# Patient Record
Sex: FEMALE | Race: WHITE | NOT HISPANIC OR LATINO | Employment: OTHER | URBAN - METROPOLITAN AREA
[De-identification: names, ages, dates, MRNs, and addresses within clinical notes are randomized per-mention and may not be internally consistent; named-entity substitution may affect disease eponyms.]

---

## 2021-05-18 ENCOUNTER — TRANSFERRED RECORDS (OUTPATIENT)
Dept: HEALTH INFORMATION MANAGEMENT | Facility: CLINIC | Age: 61
End: 2021-05-18

## 2021-11-02 ENCOUNTER — TRANSFERRED RECORDS (OUTPATIENT)
Dept: HEALTH INFORMATION MANAGEMENT | Facility: HOSPITAL | Age: 61
End: 2021-11-02

## 2021-11-02 LAB
AST SERPL-CCNC: 18 U/L (ref 5–41)
CHOLESTEROL (EXTERNAL): 209 MG/DL
CREATININE (EXTERNAL): 0.82 MG/DL (ref 0.57–1.11)
GFR ESTIMATED (EXTERNAL): >60 ML/MIN/1.73M2
GLUCOSE (EXTERNAL): 95 MG/DL (ref 70–100)
HDLC SERPL-MCNC: 50 MG/DL
HEP C HIM: NORMAL
LDL CHOLESTEROL CALCULATED (EXTERNAL): 131 MG/DL (ref 0–159)
POTASSIUM (EXTERNAL): 3.9 MMOL/L (ref 3.5–5.1)
TRIGLYCERIDES (EXTERNAL): 139 MG/DL (ref 30–150)

## 2021-12-07 ENCOUNTER — TRANSFERRED RECORDS (OUTPATIENT)
Dept: HEALTH INFORMATION MANAGEMENT | Facility: CLINIC | Age: 61
End: 2021-12-07

## 2022-02-24 ENCOUNTER — TRANSFERRED RECORDS (OUTPATIENT)
Dept: HEALTH INFORMATION MANAGEMENT | Facility: CLINIC | Age: 62
End: 2022-02-24

## 2022-07-07 ENCOUNTER — TRANSFERRED RECORDS (OUTPATIENT)
Dept: HEALTH INFORMATION MANAGEMENT | Facility: CLINIC | Age: 62
End: 2022-07-07

## 2022-10-11 ENCOUNTER — MEDICAL CORRESPONDENCE (OUTPATIENT)
Dept: CT IMAGING | Facility: HOSPITAL | Age: 62
End: 2022-10-11

## 2022-10-13 ENCOUNTER — OFFICE VISIT (OUTPATIENT)
Dept: FAMILY MEDICINE | Facility: OTHER | Age: 62
End: 2022-10-13
Attending: STUDENT IN AN ORGANIZED HEALTH CARE EDUCATION/TRAINING PROGRAM
Payer: COMMERCIAL

## 2022-10-13 VITALS
OXYGEN SATURATION: 97 % | SYSTOLIC BLOOD PRESSURE: 120 MMHG | RESPIRATION RATE: 18 BRPM | TEMPERATURE: 96.8 F | HEART RATE: 102 BPM | BODY MASS INDEX: 36.99 KG/M2 | WEIGHT: 222 LBS | HEIGHT: 65 IN | DIASTOLIC BLOOD PRESSURE: 64 MMHG

## 2022-10-13 DIAGNOSIS — F07.81 POST CONCUSSIVE SYNDROME: ICD-10-CM

## 2022-10-13 DIAGNOSIS — I10 ESSENTIAL HYPERTENSION: Primary | Chronic | ICD-10-CM

## 2022-10-13 DIAGNOSIS — D36.9 TUBULAR ADENOMA: ICD-10-CM

## 2022-10-13 DIAGNOSIS — J45.40 MODERATE PERSISTENT ASTHMA WITHOUT COMPLICATION: ICD-10-CM

## 2022-10-13 DIAGNOSIS — E04.1 THYROID NODULE: ICD-10-CM

## 2022-10-13 DIAGNOSIS — G35 MS (MULTIPLE SCLEROSIS) (H): ICD-10-CM

## 2022-10-13 DIAGNOSIS — M47.816 LUMBAR SPONDYLOSIS: ICD-10-CM

## 2022-10-13 PROBLEM — K20.90 ESOPHAGITIS: Status: ACTIVE | Noted: 2022-10-13

## 2022-10-13 PROBLEM — K57.90 DIVERTICULOSIS: Status: ACTIVE | Noted: 2017-07-25

## 2022-10-13 PROBLEM — J30.0 VASOMOTOR RHINITIS: Status: ACTIVE | Noted: 2018-06-29

## 2022-10-13 PROBLEM — E78.5 HYPERLIPIDEMIA: Chronic | Status: ACTIVE | Noted: 2022-10-13

## 2022-10-13 PROBLEM — E78.5 HYPERLIPIDEMIA: Status: ACTIVE | Noted: 2022-10-13

## 2022-10-13 PROBLEM — Z87.39 HISTORY OF OSTEOMYELITIS: Status: ACTIVE | Noted: 2022-01-06

## 2022-10-13 PROBLEM — M86.60 CHRONIC OSTEOMYELITIS (H): Status: ACTIVE | Noted: 2022-01-06

## 2022-10-13 LAB
ANION GAP SERPL CALCULATED.3IONS-SCNC: 9 MMOL/L (ref 7–15)
BUN SERPL-MCNC: 19.6 MG/DL (ref 8–23)
CALCIUM SERPL-MCNC: 9.2 MG/DL (ref 8.8–10.2)
CHLORIDE SERPL-SCNC: 107 MMOL/L (ref 98–107)
CREAT SERPL-MCNC: 1.01 MG/DL (ref 0.51–0.95)
DEPRECATED HCO3 PLAS-SCNC: 26 MMOL/L (ref 22–29)
GFR SERPL CREATININE-BSD FRML MDRD: 62 ML/MIN/1.73M2
GLUCOSE SERPL-MCNC: 97 MG/DL (ref 70–99)
POTASSIUM SERPL-SCNC: 4.6 MMOL/L (ref 3.4–5.3)
SODIUM SERPL-SCNC: 142 MMOL/L (ref 136–145)
THEOPHYLLINE SERPL-MCNC: 5.4 UG/ML (ref 10–20)
TSH SERPL DL<=0.005 MIU/L-ACNC: 1.22 UIU/ML (ref 0.3–4.2)

## 2022-10-13 PROCEDURE — G0463 HOSPITAL OUTPT CLINIC VISIT: HCPCS

## 2022-10-13 PROCEDURE — 80198 ASSAY OF THEOPHYLLINE: CPT | Mod: ZL | Performed by: STUDENT IN AN ORGANIZED HEALTH CARE EDUCATION/TRAINING PROGRAM

## 2022-10-13 PROCEDURE — 99204 OFFICE O/P NEW MOD 45 MIN: CPT | Performed by: STUDENT IN AN ORGANIZED HEALTH CARE EDUCATION/TRAINING PROGRAM

## 2022-10-13 PROCEDURE — 84443 ASSAY THYROID STIM HORMONE: CPT | Mod: ZL | Performed by: STUDENT IN AN ORGANIZED HEALTH CARE EDUCATION/TRAINING PROGRAM

## 2022-10-13 PROCEDURE — 36415 COLL VENOUS BLD VENIPUNCTURE: CPT | Mod: ZL | Performed by: STUDENT IN AN ORGANIZED HEALTH CARE EDUCATION/TRAINING PROGRAM

## 2022-10-13 PROCEDURE — 82310 ASSAY OF CALCIUM: CPT | Mod: ZL | Performed by: STUDENT IN AN ORGANIZED HEALTH CARE EDUCATION/TRAINING PROGRAM

## 2022-10-13 RX ORDER — IBUPROFEN 600 MG/1
600 TABLET, FILM COATED ORAL
COMMUNITY
Start: 2021-12-21 | End: 2023-03-02

## 2022-10-13 RX ORDER — MOMETASONE FUROATE MONOHYDRATE 50 UG/1
2 SPRAY, METERED NASAL DAILY
Qty: 17 G | Refills: 3 | Status: ON HOLD | OUTPATIENT
Start: 2022-10-13 | End: 2024-04-30

## 2022-10-13 RX ORDER — THEOPHYLLINE 400 MG/1
TABLET, EXTENDED RELEASE ORAL
Qty: 90 TABLET | Refills: 0 | Status: SHIPPED | OUTPATIENT
Start: 2022-10-13 | End: 2022-10-25

## 2022-10-13 RX ORDER — RAMIPRIL 10 MG/1
10 CAPSULE ORAL
COMMUNITY
Start: 2022-07-07 | End: 2022-10-13

## 2022-10-13 RX ORDER — ACETAMINOPHEN 500 MG
1000 TABLET ORAL DAILY PRN
Status: ON HOLD | COMMUNITY
End: 2024-04-30

## 2022-10-13 RX ORDER — CHOLECALCIFEROL (VITAMIN D3) 125 MCG
2000 CAPSULE ORAL
Status: ON HOLD | COMMUNITY
End: 2024-04-30

## 2022-10-13 RX ORDER — GINSENG 100 MG
1 CAPSULE ORAL DAILY
COMMUNITY

## 2022-10-13 RX ORDER — ALBUTEROL SULFATE 90 UG/1
2 AEROSOL, METERED RESPIRATORY (INHALATION) EVERY 4 HOURS PRN
Qty: 18 G | Refills: 3 | Status: SHIPPED | OUTPATIENT
Start: 2022-10-13 | End: 2023-05-03

## 2022-10-13 RX ORDER — THEOPHYLLINE 400 MG/1
200 TABLET, EXTENDED RELEASE ORAL
COMMUNITY
Start: 2022-07-07 | End: 2022-10-13

## 2022-10-13 RX ORDER — OMEGA-3/DHA/EPA/FISH OIL 300-1000MG
1 CAPSULE ORAL DAILY
COMMUNITY

## 2022-10-13 RX ORDER — ALBUTEROL SULFATE 90 UG/1
2 AEROSOL, METERED RESPIRATORY (INHALATION)
COMMUNITY
Start: 2022-07-07 | End: 2022-10-13

## 2022-10-13 RX ORDER — LUTEIN 10 MG
20 TABLET ORAL EVERY MORNING
COMMUNITY

## 2022-10-13 RX ORDER — LANOLIN ALCOHOL/MO/W.PET/CERES
400 CREAM (GRAM) TOPICAL EVERY MORNING
COMMUNITY

## 2022-10-13 RX ORDER — CETIRIZINE HYDROCHLORIDE 10 MG/1
1 TABLET ORAL EVERY MORNING
COMMUNITY

## 2022-10-13 RX ORDER — MOMETASONE FUROATE MONOHYDRATE 50 UG/1
2 SPRAY, METERED NASAL
COMMUNITY
Start: 2022-07-07 | End: 2022-10-13

## 2022-10-13 RX ORDER — RAMIPRIL 10 MG/1
10 CAPSULE ORAL DAILY
Qty: 90 CAPSULE | Refills: 0 | Status: SHIPPED | OUTPATIENT
Start: 2022-10-13 | End: 2023-05-03

## 2022-10-13 ASSESSMENT — PAIN SCALES - GENERAL: PAINLEVEL: SEVERE PAIN (7)

## 2022-10-13 NOTE — RESULT ENCOUNTER NOTE
Please call and notify patient that her thyroid function looks good.   Theophylline level is on the lower side, but where it has been in the past. As long as her asthma symptoms are controlled, we do not need to adjust the dose.   Her electrolytes look good but kidney function has increased slightly from before, although still in normal range. Encourage good hydration and not taking too much NSAID or ibuprofen. We can monitor again at her next appointment.     Thanks!

## 2022-10-13 NOTE — NURSING NOTE
"Chief Complaint   Patient presents with     Establish Care       Initial /64   Pulse 102   Temp 96.8  F (36  C) (Tympanic)   Resp 18   Ht 1.651 m (5' 5\")   Wt 100.7 kg (222 lb)   SpO2 97%   BMI 36.94 kg/m   Estimated body mass index is 36.94 kg/m  as calculated from the following:    Height as of this encounter: 1.651 m (5' 5\").    Weight as of this encounter: 100.7 kg (222 lb).  Medication Reconciliation: complete  Angie Montilla LPN    "

## 2022-10-13 NOTE — LETTER
January 9, 2023      Rosa Alcocer     [facility] [id number]        Dear ,    We are writing to inform you of your test results.    Your thyroid ultrasound was stable. We should repeat again in one year.     Resulted Orders   US Thyroid    Narrative    Exam: US THYROID    Exam reason: thyroid nodules, 1 year follow up, [facility]; Thyroid nodule    Technique: Ultrasound examination of the thyroid and adjacent soft  tissues was performed.    Comparison: 1/18/2022.    FINDINGS:    RIGHT LOBE:    Right lobe measures 5.2 x 2.1 x 2.0 cm.  Texture: Heterogenous.    Nodule #: 1. Location: Mid right lobe.  Size: 2 x 1.5 x 1.1 cm. Change in size: Not significantly changed.  Composition: solid/almost completely solid (2).  Echogenicity: isoechoic (1).  Shape: not taller-than-wide (0)  Margins: ill-defined (0).  Echogenic foci: none (0).      ACR TI-RADS total points: 3.   ACR TI-RADS risk category: TR3 (3 points)..    Nodule #: 2. Location: Inferior right lobe.   Size: 1.3 x 1.1 x 0.8 cm. Change in size: Not significantly changed.  Composition: solid/almost completely solid (2).  Echogenicity: isoechoic (1).  Shape: not taller-than-wide (0)  Margins: ill-defined (0).  Echogenic foci: none (0).     ACR TI-RADS total points: 3.   ACR TI-RADS risk category: TR3 (3 points).    LEFT LOBE:    Left lobe measures: 5.0 x 1.5 x 1.5 cm.  Texture: Heterogenous.    Nodule #: 3. Location: Inferior medial left lobe.  Size: 0.7 x 0.6 x 0.4 cm cm. Change in size: Not significantly changed  Composition: solid/almost completely solid (2).  Echogenicity: hypoechoic (2).  Shape: not taller-than-wide (0)  Margins: ill-defined (0).  Echogenic foci: none (0).    ACR TI-RADS total points: 4.   ACR TI-RADS risk category: TR4 (4-6 points).    Nodule #: 4. Location: Inferior left lobe.  Size: 1.1 x 1 x 0.7 cm. Change in size: Not significantly changed  Composition: solid/almost completely solid (2).  Echogenicity:  hyperechoic (1).  Shape: not taller-than-wide (0)  Margins: ill-defined (0).  Echogenic foci: none (0).      ACR TI-RADS total points: 3.   ACR TI-RADS risk category: TR3 (3 points).      ISTHMUS:   Thickness: 0.6 cm.    No discrete nodules.    LYMPH NODES: No adenopathy identified.         Impression    IMPRESSION:  There are 4 thyroid nodules which have not changed significantly, the  largest of which is a TR3 nodule in the right lobe of the thyroid  measuring up to 2 cm. There is a TR 4 nodule in the left thyroid  measuring up to 0.7 cm. Follow-up in 1 year is recommended.    ACR TI-RADS recommendations:   TR5 (?7 points) -FNA if ? 1cm, follow-up if 0.5 -0.9 cm every year for  5 years  TR4 (4-6 points) -FNA if ? 1.5cm, follow-up if 1 -1.4 cm in 1, 2, 3  and 5 years  TR3 (3 points)-FNA if ? 2.5cm, follow-up if 1.5 -2.4 cm in 1, 3 and 5  years  TR2 (2 points) & TR1 (0 points) -No FNA or follow-up    DEDE BOOKER MD         SYSTEM ID:  [id number]       If you have any questions or concerns, please call the clinic at the number listed above.       Sincerely,      Cate Alvarado MD

## 2022-10-13 NOTE — PROGRESS NOTES
Assessment & Plan     Essential hypertension  Well-controlled.  We will update BMP today.  Continue ramipril.  - Basic metabolic panel; Future  - ramipril (ALTACE) 10 MG capsule; Take 1 capsule (10 mg) by mouth daily  - Basic metabolic panel    Moderate persistent asthma without complication  Overall stable.  Has not tolerated steroid inhalers in the past, per patient report.  Did fill out release to obtain records from prior pulmonologist.  Due for monitoring of theophylline - level on lower end at 5.6 but since symptoms are controlled will not change dose today.   - Theophylline level; Future  - albuterol (PROAIR HFA/PROVENTIL HFA/VENTOLIN HFA) 108 (90 Base) MCG/ACT inhaler; Inhale 2 puffs into the lungs every 4 hours as needed for shortness of breath / dyspnea or wheezing  - mometasone (NASONEX) 50 MCG/ACT nasal spray; Spray 2 sprays in nostril daily  - theophylline (UNIPHYL) 400 MG 24 hr tablet; Take 200mg (one half tab) daily.  - Theophylline level    Post concussive syndrome  Symptoms very slowly improving.  Has completed physical, occupational, and neuro optic therapy.  Does still have significant insomnia.    MS (multiple sclerosis) (H)  In remission.  Has never required medication for this.  Saw neurology in the past.  Reports she does not need follow-up.    Tubular adenoma  Up-to-date on colonoscopy screening, as she had one this year.  Was told repeat again in 3 to 5 years.  We will review those records.    Thyroid nodule  Previous thyroid nodules, 4, that are being monitored.  Next due in January 2023.  Will order ultrasound for then and also update TSH today.  - US Thyroid; Future  - TSH with free T4 reflex; Future  - TSH with free T4 reflex  - US Thyroid    Lumbar spondylosis  She has chronic low back pain and is status post left radiofrequency percutaneous neurotomies of the L3 and L4.  Is actually planning a cortisone injection of her SI joint and is hopeful that will help.  She will follow-up if  "it does not improve.  Did also discuss PM&R, however she does not want to travel to [place] for this.  Could see if they come to [place] in the future.      I spent a total of 59 minutes on the day of the visit.   Time spent doing chart review, history and exam, documentation and further activities per the note     BMI:   Estimated body mass index is 36.94 kg/m  as calculated from the following:    Height as of this encounter: 1.651 m (5' 5\").    Weight as of this encounter: 100.7 kg (222 lb).   Weight management plan: Patient was referred to their PCP to discuss a diet and exercise plan.      Return if symptoms worsen or fail to improve. Has establish care appointment in Feb 2023.       Cate Alvarado MD  [facility]   Rosa is a 62 year old presenting for the following health issues:  Med check    HPI     Medication refills. Establishing with Dr. Masterson in Feb.     On ramipril for hypertension. Well controlled.     Previously saw pulmonologist in [place] for asthma. Is on theophylline. Did not tolerate flovent or symbicort in the past. Has been on theophylline for 12 years. Uses short acting albuterol once per day. When she is around others who smoke it makes it worse. Does not want to see pulm again. Last had lung tests done last year. Was getting levels monitored 1-2x/year.     History of double concussion syndrome. Lost memory. Still hard time with it. Has completed therapies and had prism glasses. Still has trouble sleeping. Overall improving though.     Has history of MS in remission. Diagnosed on spinal tap. Never on medicine. Has seen neurology in the past. Does not need follow up.     Has history of tubular adenomas. Had a colonoscopy this year. Did not find anything this year. Said repeat in 3-5 years per patient.     [facility] this year [facility]    Having more pain in lower back. Had left radiofrequency neurotomy of L3, L4 and L5 dorsal ramus prior in " "[place]. Going in for cortisone in SI joint next week. Did therapy until July. Wondering about pain clinic.     Had PAP with neg HPV in 2015. Unsure if she had one since. Need to obtain records.       Please abstract the following data from this visit with this patient into the appropriate field in Epic:    Tests that can be patient reported without a hard copy:  Mammogram done on this date: 3/2022 (approximately), by this group: [facility], results were normal.      Other Tests found in the patient's chart through Chart Review/Care Everywhere:  Lipid profile done by this group [facility] on this date: 11/2/2021    Note to Abstraction: If this section is blank, no results were found via Chart Review/Care Everywhere.        Objective    /64   Pulse 102   Temp 96.8  F (36  C) (Tympanic)   Resp 18   Ht 1.651 m (5' 5\")   Wt 100.7 kg (222 lb)   SpO2 97%   BMI 36.94 kg/m    Body mass index is 36.94 kg/m .     Physical Exam  Constitutional:       General: She is not in acute distress.     Appearance: Normal appearance.   Cardiovascular:      Rate and Rhythm: Normal rate and regular rhythm.      Heart sounds: No murmur heard.  Pulmonary:      Effort: Pulmonary effort is normal.      Breath sounds: Normal breath sounds. No wheezing or rales.   Neurological:      Mental Status: She is alert.   Psychiatric:         Mood and Affect: Mood normal.         Behavior: Behavior normal.          Results for orders placed or performed in visit on 10/13/22 (from the past 24 hour(s))   TSH with free T4 reflex   Result Value Ref Range    TSH 1.22 0.30 - 4.20 uIU/mL   Basic metabolic panel   Result Value Ref Range    Sodium 142 136 - 145 mmol/L    Potassium 4.6 3.4 - 5.3 mmol/L    Chloride 107 98 - 107 mmol/L    Carbon Dioxide (CO2) 26 22 - 29 mmol/L    Anion Gap 9 7 - 15 mmol/L    Urea Nitrogen 19.6 8.0 - 23.0 mg/dL    Creatinine 1.01 (H) 0.51 - 0.95 mg/dL    Calcium 9.2 8.8 - 10.2 mg/dL    Glucose 97 70 - 99 mg/dL    GFR " Estimate 62 >60 mL/min/1.73m2   Theophylline level   Result Value Ref Range    Theophylline 5.4 (L) 10.0 - 20.0 ug/mL

## 2022-10-17 ENCOUNTER — TELEPHONE (OUTPATIENT)
Dept: FAMILY MEDICINE | Facility: OTHER | Age: 62
End: 2022-10-17

## 2022-10-17 DIAGNOSIS — J45.40 MODERATE PERSISTENT ASTHMA WITHOUT COMPLICATION: Primary | ICD-10-CM

## 2022-10-18 ENCOUNTER — TELEPHONE (OUTPATIENT)
Dept: FAMILY MEDICINE | Facility: OTHER | Age: 62
End: 2022-10-18

## 2022-10-18 NOTE — TELEPHONE ENCOUNTER
Pt calling and thought she missed a call and thought maybe it was an increase in her Theophylline?    She is aware go the lab results from 10.13.2022    A lot of times her asthma is controlled but she has been spending more time with people who smoke.She is wondering if she should alternate taking one whole pill(400mg) and then 1/2tab(200mg).She takes the time released.       The med is at [facility]      Please advise.    Call back [phone] to leave detailed message    Radha Veronica RN

## 2022-10-18 NOTE — TELEPHONE ENCOUNTER
Does feel like she is needing her albuterol daily, more often if around smokers. Again didn't tolerate inhaled steroids prior. Theophylline low, and near trough level. Will increase to 400mg. Recheck three days after higher dose, Monday 10/24 at 3pm for trough level. She will monitor for any signs of toxicity.

## 2022-10-21 ENCOUNTER — HOSPITAL ENCOUNTER (OUTPATIENT)
Dept: CT IMAGING | Facility: HOSPITAL | Age: 62
Discharge: HOME | End: 2022-10-21
Attending: NURSE PRACTITIONER | Admitting: RADIOLOGY
Payer: COMMERCIAL

## 2022-10-21 ENCOUNTER — HOSPITAL ENCOUNTER (OUTPATIENT)
Facility: HOSPITAL | Age: 62
Discharge: HOME | End: 2022-10-21
Attending: RADIOLOGY | Admitting: RADIOLOGY
Payer: COMMERCIAL

## 2022-10-21 DIAGNOSIS — G89.29 CHRONIC LEFT SI JOINT PAIN: ICD-10-CM

## 2022-10-21 DIAGNOSIS — M53.3 CHRONIC LEFT SI JOINT PAIN: ICD-10-CM

## 2022-10-21 PROCEDURE — 27096 INJECT SACROILIAC JOINT: CPT

## 2022-10-21 PROCEDURE — 250N000009 HC RX 250: Performed by: RADIOLOGY

## 2022-10-21 PROCEDURE — 250N000011 HC RX IP 250 OP 636: Performed by: RADIOLOGY

## 2022-10-21 RX ORDER — BUPIVACAINE HYDROCHLORIDE 5 MG/ML
10 INJECTION, SOLUTION EPIDURAL; INTRACAUDAL ONCE
Status: COMPLETED | OUTPATIENT
Start: 2022-10-21 | End: 2022-10-21

## 2022-10-21 RX ORDER — TRIAMCINOLONE ACETONIDE 40 MG/ML
40-80 INJECTION, SUSPENSION INTRA-ARTICULAR; INTRAMUSCULAR ONCE
Status: COMPLETED | OUTPATIENT
Start: 2022-10-21 | End: 2022-10-21

## 2022-10-21 RX ORDER — LIDOCAINE HYDROCHLORIDE 10 MG/ML
10 INJECTION, SOLUTION EPIDURAL; INFILTRATION; INTRACAUDAL; PERINEURAL ONCE
Status: COMPLETED | OUTPATIENT
Start: 2022-10-21 | End: 2022-10-21

## 2022-10-21 RX ADMIN — BUPIVACAINE HYDROCHLORIDE 10 ML: 5 INJECTION, SOLUTION EPIDURAL; INTRACAUDAL; PERINEURAL at 08:31

## 2022-10-21 RX ADMIN — LIDOCAINE HYDROCHLORIDE 8 ML: 10 INJECTION, SOLUTION EPIDURAL; INFILTRATION; INTRACAUDAL; PERINEURAL at 08:31

## 2022-10-21 RX ADMIN — TRIAMCINOLONE ACETONIDE 40 MG: 40 INJECTION, SUSPENSION INTRA-ARTICULAR; INTRAMUSCULAR at 08:32

## 2022-10-21 ASSESSMENT — ACTIVITIES OF DAILY LIVING (ADL)
ADLS_ACUITY_SCORE: 35
ADLS_ACUITY_SCORE: 35

## 2022-10-21 NOTE — PROGRESS NOTES
Checked with pharmacy due to red flag when ordering Kenalog for an SI injection because Patient has fluticasone on allergy list. Patient does use Nasonex. She also has had other injections with steroids without any allergic reactions. Pharmacist agrees that patient is ok to receive injection today

## 2022-10-21 NOTE — DISCHARGE INSTRUCTIONS
Discharge Instructions for an Radiology Injection    Home number on file [phone] (home)  Is it ok to leave a message at this number(s) Yes    Dr Rich completed your procedure on 10/21/2022.    Current Pain Level (0-10 Scale): 8/10  Post Pain Level (0-10):  3/10      Activity Level:     Do not do any heavy activity or exercise for 24 hours.   Hip Injections:  Do not drive for 4 hours after your injection.  Diet:   Return to your normal diet.  Medications:   If you have stopped taking your Aspirin, Coumadin/Warfarin, Ibuprofen, or any   other blood thinner for this procedure you may resume in the morning unless   your primary care provider has given you other instructions.    Diabetics may see an increase in blood sugar after steroid injections. If you are concerned about your blood sugar, please contact your family doctor.    Site Care:  Remove the bandage and bathe or shower the morning after the procedure.      Please allow two weeks to experience improvement in your pain.  If you have any further issues, please contact your provider.  Call your Primary Care Provider if you have the following (if your primary care provider is not available please seek emergency care):   Nausea with vomiting   Severe headache   Drowsiness or confusion   Redness or drainage at the injection or puncture site   Temperature over 101 degrees F   Other concerns   Increasing joint pain

## 2022-10-24 ENCOUNTER — LAB (OUTPATIENT)
Dept: LAB | Facility: OTHER | Age: 62
End: 2022-10-24
Payer: COMMERCIAL

## 2022-10-24 DIAGNOSIS — J45.40 MODERATE PERSISTENT ASTHMA WITHOUT COMPLICATION: ICD-10-CM

## 2022-10-24 LAB — THEOPHYLLINE SERPL-MCNC: 9 UG/ML (ref 10–20)

## 2022-10-24 PROCEDURE — 80198 ASSAY OF THEOPHYLLINE: CPT | Mod: ZL

## 2022-10-24 PROCEDURE — 36415 COLL VENOUS BLD VENIPUNCTURE: CPT | Mod: ZL

## 2022-10-24 NOTE — RESULT ENCOUNTER NOTE
Please call and notify patient that her theophylline level is better - in range with her asthma and age. We can monitor her asthma control now and recheck as needed, at least once/year or if other medications change.     Thanks!

## 2022-10-25 RX ORDER — THEOPHYLLINE 400 MG/1
400 TABLET, EXTENDED RELEASE ORAL DAILY
Qty: 90 TABLET | Refills: 0 | Status: SHIPPED | OUTPATIENT
Start: 2022-10-25 | End: 2022-10-25

## 2022-10-25 RX ORDER — THEOPHYLLINE 400 MG/1
400 TABLET, EXTENDED RELEASE ORAL DAILY
Qty: 90 TABLET | Refills: 0 | Status: SHIPPED | OUTPATIENT
Start: 2022-10-25 | End: 2023-02-02

## 2023-01-09 ENCOUNTER — HOSPITAL ENCOUNTER (EMERGENCY)
Facility: HOSPITAL | Age: 63
End: 2023-01-09
Payer: COMMERCIAL

## 2023-01-09 ENCOUNTER — HOSPITAL ENCOUNTER (EMERGENCY)
Facility: HOSPITAL | Age: 63
Discharge: HOME | End: 2023-01-09
Attending: NURSE PRACTITIONER | Admitting: NURSE PRACTITIONER
Payer: COMMERCIAL

## 2023-01-09 ENCOUNTER — HOSPITAL ENCOUNTER (OUTPATIENT)
Dept: ULTRASOUND IMAGING | Facility: HOSPITAL | Age: 63
Discharge: HOME | End: 2023-01-09
Attending: STUDENT IN AN ORGANIZED HEALTH CARE EDUCATION/TRAINING PROGRAM | Admitting: STUDENT IN AN ORGANIZED HEALTH CARE EDUCATION/TRAINING PROGRAM
Payer: COMMERCIAL

## 2023-01-09 VITALS
RESPIRATION RATE: 18 BRPM | DIASTOLIC BLOOD PRESSURE: 98 MMHG | SYSTOLIC BLOOD PRESSURE: 154 MMHG | HEART RATE: 95 BPM | TEMPERATURE: 99.2 F | OXYGEN SATURATION: 95 %

## 2023-01-09 DIAGNOSIS — J06.9 UPPER RESPIRATORY INFECTION: ICD-10-CM

## 2023-01-09 DIAGNOSIS — G44.219 EPISODIC TENSION-TYPE HEADACHE, NOT INTRACTABLE: ICD-10-CM

## 2023-01-09 DIAGNOSIS — J32.9 SINUSITIS: Primary | ICD-10-CM

## 2023-01-09 DIAGNOSIS — J32.9 SINUSITIS, UNSPECIFIED CHRONICITY, UNSPECIFIED LOCATION: ICD-10-CM

## 2023-01-09 LAB
FLUAV RNA SPEC QL NAA+PROBE: NEGATIVE
FLUBV RNA RESP QL NAA+PROBE: NEGATIVE
RSV RNA SPEC NAA+PROBE: NEGATIVE
SARS-COV-2 RNA RESP QL NAA+PROBE: NEGATIVE

## 2023-01-09 PROCEDURE — C9803 HOPD COVID-19 SPEC COLLECT: HCPCS

## 2023-01-09 PROCEDURE — G0463 HOSPITAL OUTPT CLINIC VISIT: HCPCS | Mod: 25

## 2023-01-09 PROCEDURE — 99213 OFFICE O/P EST LOW 20 MIN: CPT | Performed by: NURSE PRACTITIONER

## 2023-01-09 PROCEDURE — 76536 US EXAM OF HEAD AND NECK: CPT

## 2023-01-09 PROCEDURE — G0463 HOSPITAL OUTPT CLINIC VISIT: HCPCS

## 2023-01-09 PROCEDURE — 87637 SARSCOV2&INF A&B&RSV AMP PRB: CPT | Performed by: NURSE PRACTITIONER

## 2023-01-09 ASSESSMENT — ENCOUNTER SYMPTOMS
TROUBLE SWALLOWING: 0
SHORTNESS OF BREATH: 1
DIZZINESS: 0
HEADACHES: 1
WEAKNESS: 0
SINUS PAIN: 1
VOICE CHANGE: 0
FEVER: 0
PHOTOPHOBIA: 0
SINUS PRESSURE: 1
COUGH: 1
SORE THROAT: 1
RHINORRHEA: 1
NUMBNESS: 0
CHILLS: 0
APPETITE CHANGE: 0

## 2023-01-09 ASSESSMENT — ACTIVITIES OF DAILY LIVING (ADL): ADLS_ACUITY_SCORE: 35

## 2023-01-09 NOTE — ED TRIAGE NOTES
"Patient presents to urgent care for cough since before Paia. Patient reports greenish and bloody discharge coming out of nose. Patient states can not take Ibuprofen due to her kidneys.  Patient would like a COVID test but thinks \"it's a sinus infection\".      "

## 2023-01-09 NOTE — DISCHARGE INSTRUCTIONS
Take the antibiotic as prescribed until finished.    Take Tylenol as needed for pain.    Follow-up with your doctor if no improvement in symptoms.    Return to urgent care or emergency department for any worsening or concerning symptoms.

## 2023-01-09 NOTE — ED TRIAGE NOTES
"\"Here for having a cough since before Ben and a headache, but I always have a headache, but it is in a different spot.  I cannot take Ibuprofen because it is hard on my kidneys.  I also have not taken any tylenol.\"        "

## 2023-01-09 NOTE — ED PROVIDER NOTES
History     Chief Complaint   Patient presents with     Cough     Headache     HPI  Rosa Alcocer is a 62 year old female who presents to urgent care for evaluation of URI symptoms that started 2-1/2 weeks ago.  Patient reports a cough, intermittent sore throat, sinus pain and pressure, rhinorrhea and shortness of breath.  This is accompanied by an intermittent left-sided headache.  No fever or chills.  No chest pain.  No nausea, vomiting or diarrhea.  Eating and drinking okay.  No changes to her vision.  No new numbness or tingling sensation.  Patient tells me she does have a history of concussion syndrome from a fall 3 years ago and has chronic right-sided headaches.  She has tried OTC cough/cold medication.  She has not taken anything for her pain.  Notes was told to stop taking ibuprofen as she was taking too much for her chronic right-sided headaches.  Patient is requesting a COVID test.    Allergies:  Allergies   Allergen Reactions     Codeine Shortness Of Breath and Swelling     Fentanyl Other (See Comments)     Bradycardia & Hypotension     Meloxicam Other (See Comments)     Hypersensitive, SOB, insomnia, HTN     No Clinical Screening - See Comments Other (See Comments) and Shortness Of Breath     MOLD, GRASSES. Some cleaning products, perfumes, tar,  Skunk scent     Prochlorperazine Hives, Shortness Of Breath and Swelling     COMPAZINE       Shellfish Allergy Hives, Shortness Of Breath and Swelling     Amlodipine Other (See Comments)     Hip pain and edema.     Cyclobenzaprine Other (See Comments)     Jittery, leg cramps, insomnia,      Losartan Cough, Itching and Muscle Pain (Myalgia)     Fluticasone Swelling     Symbicort Other (See Comments) and Palpitations     Leg pains       Problem List:    Patient Active Problem List    Diagnosis Date Noted     Esophagitis 10/13/2022     Priority: Medium     Essential hypertension 10/13/2022     Priority: Medium     Hyperlipidemia 10/13/2022     Priority: Medium      MS (multiple sclerosis) (H) 10/13/2022     Priority: Medium     history of multiple sclerosis, but has been in remission for years, not on current treatment.         Thyroid nodule 10/13/2022     Priority: Medium     Jan 2022 - stable nodules with recommendations to recheck in 1 year.         Class 2 obesity due to excess calories without serious comorbidity with body mass index (BMI) of 36.0 to 36.9 in adult 10/13/2022     Priority: Medium     Lumbar spondylosis 06/08/2022     Priority: Medium     S/p Left Radiofrequency percutaneous neurotomy of the L3, L4 medial branches and the L5 dorsal ramus, to cover the facet joints of L4/L5 and L5/S1.          History of osteomyelitis 01/06/2022     Priority: Medium     Of jaw, secondary to tooth abscess. S/p IV abx and PICC. Had 3 surgeries and lost 7 teeth. Follow up with ID as needed.       Post concussive syndrome 12/21/2020     Priority: Medium     history of a fall at work on12/26/2019, and sustained a traumatic brain injury and has chronic headaches and postconcussive syndrome. She was taking the garbage out and hit a patch of ice and hit her buttock and her head. Did do occupational therapy and see neurooptics and had prism glasses.         Vasomotor rhinitis 06/29/2018     Priority: Medium     Diverticulosis 07/25/2017     Priority: Medium     Personal history of colonic polyps 07/25/2017     Priority: Medium     2/24/22 - rpt in five years        Dysphonia 10/21/2014     Priority: Medium     Moderate persistent asthma without complication 06/18/2012     Priority: Medium     on theophylline. She was seen by Dr. Moreno for her asthma in the past and this was recommended by him. Her symptoms are tirggered by heat and humidity. She uses ventolin about once per day and feels symptoms.         Tubular adenoma 03/09/2009     Priority: Medium     On colonoscopy 2009.  Repeat colonoscopy in 1 year per GI Recommendations (see note).          Past Medical History:     Past Medical History:   Diagnosis Date     Colon cancer (H)        Past Surgical History:    History reviewed. No pertinent surgical history.    Family History:    Family History   Problem Relation Age of Onset     Alzheimer Disease Mother      Alcoholism Mother      Myocardial Infarction Mother      Thrombosis Mother      Cerebrovascular Disease Father      Hearing Loss Father      Myocardial Infarction Father      Valvular heart disease Brother      Cerebrovascular Disease Brother      Cancer Brother      Hypertension Brother      Skin Cancer Brother      Osteopenia Brother      Hypertension Sister      Diabetes Sister      Valvular heart disease Sister      Aortic Valve Replacement Sister      Hypertension Sister      Breast Cancer Sister      Macular Degeneration Sister      Hypertension Sister      Osteopenia Sister        Social History:  Marital Status:  Single [1]  Social History     Tobacco Use     Smoking status: Former     Packs/day: 2.00     Years: 14.00     Pack years: 28.00     Types: Cigarettes     Start date: 1970     Quit date: 1977     Years since quittin.0     Smokeless tobacco: Never   Substance Use Topics     Alcohol use: Not Currently     Drug use: Never        Medications:    acetaminophen (TYLENOL) 500 MG tablet  albuterol (PROAIR HFA/PROVENTIL HFA/VENTOLIN HFA) 108 (90 Base) MCG/ACT inhaler  amoxicillin-clavulanate (AUGMENTIN) 875-125 MG tablet  aspirin (ASA) 81 MG EC tablet  Calcium Citrate-Vitamin D 250-200 MG-UNIT TABS  cetirizine (ZYRTEC) 10 MG tablet  cholecalciferol (VITAMIN D3) 25 mcg (1000 units) capsule  Cyanocobalamin 50 MCG TABS  fish oil-omega-3 fatty acids 1000 MG capsule  folic acid (FOLVITE) 400 MCG tablet  Lutein 10 MG TABS  magnesium 100 MG CAPS  mometasone (NASONEX) 50 MCG/ACT nasal spray  potassium gluconate 2.5 MEQ TABS  Probiotic Product (PROBIOTIC-10 PO)  ramipril (ALTACE) 10 MG capsule  theophylline (UNIPHYL) 400 MG 24 hr tablet  vitamin A 3 MG  (04633 UNITS) capsule  Zinc 50 MG CAPS  ibuprofen (ADVIL/MOTRIN) 600 MG tablet          Review of Systems   Constitutional: Negative for appetite change, chills and fever.   HENT: Positive for rhinorrhea, sinus pressure, sinus pain and sore throat. Negative for trouble swallowing and voice change.    Eyes: Negative for photophobia and visual disturbance.   Respiratory: Positive for cough and shortness of breath.    Cardiovascular: Negative for chest pain.   Neurological: Positive for headaches. Negative for dizziness, weakness and numbness.   All other systems reviewed and are negative.      Physical Exam   BP: 154/98  Pulse: 95  Temp: 99.2  F (37.3  C)  Resp: 18  SpO2: 95 %      Physical Exam  Vitals and nursing note reviewed.   Constitutional:       Appearance: Normal appearance. She is not ill-appearing or toxic-appearing.   HENT:      Head: Atraumatic.      Right Ear: Tympanic membrane and ear canal normal.      Left Ear: Tympanic membrane and ear canal normal.      Nose: Nose normal.      Mouth/Throat:      Mouth: Mucous membranes are moist.   Eyes:      Extraocular Movements: Extraocular movements intact.      Pupils: Pupils are equal, round, and reactive to light.   Cardiovascular:      Rate and Rhythm: Normal rate and regular rhythm.      Heart sounds: Normal heart sounds. No murmur heard.  Pulmonary:      Effort: Pulmonary effort is normal. No respiratory distress.      Breath sounds: Normal breath sounds.   Musculoskeletal:         General: Normal range of motion.      Cervical back: Neck supple.   Skin:     General: Skin is warm and dry.      Coloration: Skin is not pale.   Neurological:      Mental Status: She is alert and oriented to person, place, and time.      GCS: GCS eye subscore is 4. GCS verbal subscore is 5. GCS motor subscore is 6.      Cranial Nerves: Cranial nerves 2-12 are intact. No cranial nerve deficit, dysarthria or facial asymmetry.      Sensory: Sensation is intact. No sensory deficit.       Motor: Motor function is intact.      Coordination: Coordination is intact.      Gait: Gait is intact.         ED Course                 Procedures                Medications - No data to display    Assessments & Plan (with Medical Decision Making)     I have reviewed the nursing notes.    62-year-old female that presented for evaluation of URI symptoms that started 2-1/2 weeks ago.  Heart rate and rhythm are regular.  Respirations are nonlabored.  No focal neurological deficits.  Patient will be treated for sinusitis with Augmentin.  Recommended Tylenol as needed for the headaches when she does get them.  Close follow-up with primary medical provider if no improvement in symptoms.  Return to urgent care emergency department for any worsening or concerning symptoms.    I have reviewed the findings, diagnosis, plan and need for follow up with the patient.    This document was prepared using a combination of typing and voice generated software.  While every attempt was made for accuracy, spelling and grammatical errors may exist.      Discharge Medication List as of 1/9/2023  3:36 PM      START taking these medications    Details   amoxicillin-clavulanate (AUGMENTIN) 875-125 MG tablet Take 1 tablet by mouth 2 times daily for 7 days, Disp-14 tablet, R-0, E-Prescribe             Final diagnoses:   Sinusitis   Upper respiratory infection   Episodic tension-type headache, not intractable       1/9/2023   [facility]     Mpofu, Prudence, CNP  01/09/23 [id number]

## 2023-01-12 ENCOUNTER — HOSPITAL ENCOUNTER (EMERGENCY)
Facility: HOSPITAL | Age: 63
Discharge: HOME | End: 2023-01-12
Attending: PHYSICIAN ASSISTANT | Admitting: PHYSICIAN ASSISTANT
Payer: COMMERCIAL

## 2023-01-12 ENCOUNTER — APPOINTMENT (OUTPATIENT)
Dept: GENERAL RADIOLOGY | Facility: HOSPITAL | Age: 63
End: 2023-01-12
Attending: PHYSICIAN ASSISTANT
Payer: COMMERCIAL

## 2023-01-12 VITALS
OXYGEN SATURATION: 97 % | RESPIRATION RATE: 18 BRPM | TEMPERATURE: 97 F | HEART RATE: 79 BPM | SYSTOLIC BLOOD PRESSURE: 156 MMHG | DIASTOLIC BLOOD PRESSURE: 88 MMHG

## 2023-01-12 DIAGNOSIS — J32.9 SINUSITIS: ICD-10-CM

## 2023-01-12 DIAGNOSIS — J32.9 SINUSITIS, UNSPECIFIED CHRONICITY, UNSPECIFIED LOCATION: ICD-10-CM

## 2023-01-12 PROCEDURE — 71046 X-RAY EXAM CHEST 2 VIEWS: CPT

## 2023-01-12 PROCEDURE — 99213 OFFICE O/P EST LOW 20 MIN: CPT | Performed by: PHYSICIAN ASSISTANT

## 2023-01-12 PROCEDURE — G0463 HOSPITAL OUTPT CLINIC VISIT: HCPCS | Mod: 25

## 2023-01-12 RX ORDER — DOXYCYCLINE 100 MG/1
100 CAPSULE ORAL 2 TIMES DAILY
Qty: 20 CAPSULE | Refills: 0 | Status: SHIPPED | OUTPATIENT
Start: 2023-01-12 | End: 2023-01-22

## 2023-01-12 ASSESSMENT — ENCOUNTER SYMPTOMS
FEVER: 0
SINUS PAIN: 1
SORE THROAT: 0
COUGH: 1
DYSURIA: 0
ARTHRALGIAS: 0
FATIGUE: 1
WEAKNESS: 0
SHORTNESS OF BREATH: 1
WHEEZING: 0
NAUSEA: 0
SINUS PRESSURE: 1
CHEST TIGHTNESS: 1
STRIDOR: 0
CHILLS: 1

## 2023-01-12 NOTE — ED TRIAGE NOTES
Seen on Monday, started on antibiotics for sinus infection.  Increased cough with shortness of breath with ventolin inhaler.  States she does not think she is improving.

## 2023-01-12 NOTE — DISCHARGE INSTRUCTIONS
Trial doxycycline antibiotics 1 tab twice daily for 10 days due to ongoing symptoms even with prior Augmentin antibiotics.  As discussed, avoid direct sunlight with this antibiotic due to skin sensitivity  Monitor symptoms, should expect some relief within 24-48 hours but evaluate further after the full course of antibiotics completed  Contact your primary care provider or your prior oral surgeon if you have ongoing concerns similar to prior jaw infection

## 2023-01-12 NOTE — ED PROVIDER NOTES
History     Chief Complaint   Patient presents with     Cough     Shortness of Breath     HPI  Rosa Alcocer is a 62 year old female who ho returns to the urgent care due to ongoing worsening upper respiratory infection symptoms.  She was just seen in the urgent care earlier this week on 1/9/2023, diagnosed with sinusitis due to ongoing 2 and half weeks of symptoms, currently taking Augmentin.    Unfortunately she is not getting any better.  Describes symptoms of facial pressure, productive cough with mucus, feeling ill, chest pain, and shortness of breath.  Hx of asthma, has utilized her albuterol inhaler 4 times daily due to shortness of breath.  Her baseline is 1 dose per day for maintenance, so definitely a significant increase in frequency.  Denies fevers, sweats, worsening headache, nausea, vomiting.    Allergies:  Allergies   Allergen Reactions     Codeine Shortness Of Breath and Swelling     Fentanyl Other (See Comments)     Bradycardia & Hypotension     Meloxicam Other (See Comments)     Hypersensitive, SOB, insomnia, HTN     No Clinical Screening - See Comments Other (See Comments) and Shortness Of Breath     MOLD, GRASSES. Some cleaning products, perfumes, tar,  Skunk scent     Prochlorperazine Hives, Shortness Of Breath and Swelling     COMPAZINE       Shellfish Allergy Hives, Shortness Of Breath and Swelling     Amlodipine Other (See Comments)     Hip pain and edema.     Cyclobenzaprine Other (See Comments)     Jittery, leg cramps, insomnia,      Losartan Cough, Itching and Muscle Pain (Myalgia)     Fluticasone Swelling     Symbicort Other (See Comments) and Palpitations     Leg pains       Problem List:    Patient Active Problem List    Diagnosis Date Noted     Esophagitis 10/13/2022     Priority: Medium     Essential hypertension 10/13/2022     Priority: Medium     Hyperlipidemia 10/13/2022     Priority: Medium     MS (multiple sclerosis) (H) 10/13/2022     Priority: Medium     history of multiple  sclerosis, but has been in remission for years, not on current treatment.         Thyroid nodule 10/13/2022     Priority: Medium     Jan 2022 - stable nodules with recommendations to recheck in 1 year.         Class 2 obesity due to excess calories without serious comorbidity with body mass index (BMI) of 36.0 to 36.9 in adult 10/13/2022     Priority: Medium     Lumbar spondylosis 06/08/2022     Priority: Medium     S/p Left Radiofrequency percutaneous neurotomy of the L3, L4 medial branches and the L5 dorsal ramus, to cover the facet joints of L4/L5 and L5/S1.          History of osteomyelitis 01/06/2022     Priority: Medium     Of jaw, secondary to tooth abscess. S/p IV abx and PICC. Had 3 surgeries and lost 7 teeth. Follow up with ID as needed.       Post concussive syndrome 12/21/2020     Priority: Medium     history of a fall at work on12/26/2019, and sustained a traumatic brain injury and has chronic headaches and postconcussive syndrome. She was taking the garbage out and hit a patch of ice and hit her buttock and her head. Did do occupational therapy and see neurooptics and had prism glasses.         Vasomotor rhinitis 06/29/2018     Priority: Medium     Diverticulosis 07/25/2017     Priority: Medium     Personal history of colonic polyps 07/25/2017     Priority: Medium     2/24/22 - rpt in five years        Dysphonia 10/21/2014     Priority: Medium     Moderate persistent asthma without complication 06/18/2012     Priority: Medium     on theophylline. She was seen by Dr. Moreno for her asthma in the past and this was recommended by him. Her symptoms are tirggered by heat and humidity. She uses ventolin about once per day and feels symptoms.         Tubular adenoma 03/09/2009     Priority: Medium     On colonoscopy 2009.  Repeat colonoscopy in 1 year per GI Recommendations (see note).          Past Medical History:    Past Medical History:   Diagnosis Date     Colon cancer (H) 2008       Past Surgical  History:    No past surgical history on file.    Family History:    Family History   Problem Relation Age of Onset     Alzheimer Disease Mother      Alcoholism Mother      Myocardial Infarction Mother      Thrombosis Mother      Cerebrovascular Disease Father      Hearing Loss Father      Myocardial Infarction Father      Valvular heart disease Brother      Cerebrovascular Disease Brother      Cancer Brother      Hypertension Brother      Skin Cancer Brother      Osteopenia Brother      Hypertension Sister      Diabetes Sister      Valvular heart disease Sister      Aortic Valve Replacement Sister      Hypertension Sister      Breast Cancer Sister      Macular Degeneration Sister      Hypertension Sister      Osteopenia Sister        Social History:  Marital Status:  Single [1]  Social History     Tobacco Use     Smoking status: Former     Packs/day: 2.00     Years: 14.00     Pack years: 28.00     Types: Cigarettes     Start date: 1970     Quit date: 1977     Years since quittin.0     Smokeless tobacco: Never   Substance Use Topics     Alcohol use: Not Currently     Drug use: Never        Medications:    acetaminophen (TYLENOL) 500 MG tablet  albuterol (PROAIR HFA/PROVENTIL HFA/VENTOLIN HFA) 108 (90 Base) MCG/ACT inhaler  aspirin (ASA) 81 MG EC tablet  Calcium Citrate-Vitamin D 250-200 MG-UNIT TABS  cetirizine (ZYRTEC) 10 MG tablet  cholecalciferol (VITAMIN D3) 25 mcg (1000 units) capsule  Cyanocobalamin 50 MCG TABS  doxycycline hyclate (VIBRAMYCIN) 100 MG capsule  fish oil-omega-3 fatty acids 1000 MG capsule  folic acid (FOLVITE) 400 MCG tablet  Lutein 10 MG TABS  magnesium 100 MG CAPS  mometasone (NASONEX) 50 MCG/ACT nasal spray  potassium gluconate 2.5 MEQ TABS  Probiotic Product (PROBIOTIC-10 PO)  ramipril (ALTACE) 10 MG capsule  theophylline (UNIPHYL) 400 MG 24 hr tablet  vitamin A 3 MG (08823 UNITS) capsule  Zinc 50 MG CAPS  ibuprofen (ADVIL/MOTRIN) 600 MG tablet          Review of Systems    Constitutional: Positive for chills and fatigue. Negative for fever.   HENT: Positive for congestion, nosebleeds, sinus pressure and sinus pain. Negative for ear pain, hearing loss and sore throat.    Eyes: Negative for visual disturbance.   Respiratory: Positive for cough, chest tightness and shortness of breath. Negative for wheezing and stridor.    Gastrointestinal: Negative for nausea.   Genitourinary: Negative for dysuria.   Musculoskeletal: Negative for arthralgias.   Skin: Negative for rash.   Neurological: Negative for weakness.       Physical Exam   BP: (!) 184/105  Pulse: 79  Temp: 97  F (36.1  C)  Resp: 18  SpO2: 97 %  Repeat /88    Physical Exam  Vitals and nursing note reviewed.   Constitutional:       Comments: Mildly ill appearing   HENT:      Head:      Comments: Maxillary sinus pain with palpation     Right Ear: Tympanic membrane normal.      Left Ear: Tympanic membrane normal.      Nose: Congestion present.      Mouth/Throat:      Mouth: Mucous membranes are moist.      Pharynx: No oropharyngeal exudate or posterior oropharyngeal erythema.   Eyes:      Conjunctiva/sclera: Conjunctivae normal.   Cardiovascular:      Rate and Rhythm: Normal rate and regular rhythm.      Pulses: Normal pulses.   Pulmonary:      Effort: Pulmonary effort is normal. No respiratory distress.      Breath sounds: No stridor. No wheezing or rhonchi.      Comments: Breath sound normal but diminished  Musculoskeletal:      Cervical back: Normal range of motion and neck supple.   Lymphadenopathy:      Cervical: No cervical adenopathy.   Skin:     General: Skin is warm and dry.   Neurological:      General: No focal deficit present.      Mental Status: She is alert.         ED Course         Results for orders placed or performed during the hospital encounter of 01/12/23 (from the past 24 hour(s))   XR Chest 2 Views    Narrative    Exam:  XR CHEST 2 VIEWS    HISTORY: cough, URI, eval for pneumonia.    COMPARISON:   "None.    FINDINGS:     The cardiomediastinal contours are normal.      No focal consolidation, effusion, or pneumothorax.      No acute osseous abnormality.       Impression    IMPRESSION:      No acute cardiopulmonary process.      DEDE BOOKER MD         SYSTEM ID:  [id number]       Medications - No data to display    Assessments & Plan (with Medical Decision Making)   Rosa is a 62-year-old female who returns to the urgent care (second time this week) due to ongoing worsening sinusitis symptoms.  No relief with her prior Augmentin antibiotics.    - Due to ongoing symptoms along with productive cough, x-ray chest warranted which showed no pneumonia  - Vital signs stable, labs not warranted at this time  - Due to worsening symptoms and no relief with prior antibiotics, presumed failed treatment and resistance so we will change to doxycycline x10 days  - Discussed warning signs of skin sensitivity with sunlight due to antibiotics, she understands    - At end of visit patient mention that she had very similar symptoms a year ago January 2022 where she got repeat antibiotic prescriptions (5 total) and unfortunately developed a mandible/jaw infection requiring surgical incision and debridement.  She states that she did not require any bone fixation but she did lose multiple teeth.  Rightfully she is nervous due to her current symptoms feeling similar.  She is not having any current teeth or jaw pain, but is having maxillary sinus pain.  Advised her to complete her antibiotic regimen and if symptoms are worsening or she is not getting any results she should request to see her PCP or contact her prior oral surgeon.  She states that her surgery was back in January 2022 and \"centrasota\" in [facility], but I am unable to see any operating notes or progress notes in Care Everywhere.    - Patient agrees with plan for second antibiotic trial, and well seek medical attention if symptoms do not resolve or worsen    I " have reviewed the nursing notes.    I have reviewed the findings, diagnosis, plan and need for follow up with the patient.      Medical Decision Making  The patient presented with a problem that is a chronic illness mild to moderate exacerbation, progression, or side effect of treatment.        Discharge Medication List as of 1/12/2023 12:27 PM      START taking these medications    Details   doxycycline hyclate (VIBRAMYCIN) 100 MG capsule Take 1 capsule (100 mg) by mouth 2 times daily for 10 days, Disp-20 capsule, R-0, E-Prescribe             Final diagnoses:   Sinusitis     Robin Dumont PA-C      1/12/2023   [facility]     Robin Dumotn PA-C  01/12/23 [id number]

## 2023-01-12 NOTE — ED TRIAGE NOTES
Pt presents with c/o SOB.  Reports she was seen on Monday, diagnosed with a URI and a sinus infection, given abx (augmentin, still has 3 days left) . States that she has a hx of asthma. States that even with the ventolin inhaler isn't helping either. Pt has been taking mucinex, robitussin DM, Cough and cold for high BP. Blood discharge from nose. States the discharge is green with blood streaks in it.      Triage Assessment     Row Name 01/12/23 0211       Triage Assessment (Adult)    Airway WDL WDL       Respiratory WDL    Respiratory WDL WDL       Skin Circulation/Temperature WDL    Skin Circulation/Temperature WDL WDL       Cardiac WDL    Cardiac WDL WDL

## 2023-02-02 DIAGNOSIS — J45.40 MODERATE PERSISTENT ASTHMA WITHOUT COMPLICATION: ICD-10-CM

## 2023-02-02 RX ORDER — THEOPHYLLINE 400 MG/1
TABLET, EXTENDED RELEASE ORAL
Qty: 90 TABLET | Refills: 0 | Status: SHIPPED | OUTPATIENT
Start: 2023-02-02 | End: 2023-05-03

## 2023-02-02 NOTE — TELEPHONE ENCOUNTER
theophylline (UNIPHYL) 400 MG 24 hr tablet 90 tablet 0 10/25/2022       Last Office Visit: 10/13/2022  [facility] [place]    Future Office visit:
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
## 2023-03-02 ENCOUNTER — OFFICE VISIT (OUTPATIENT)
Dept: FAMILY MEDICINE | Facility: OTHER | Age: 63
End: 2023-03-02
Attending: FAMILY MEDICINE
Payer: MEDICARE

## 2023-03-02 VITALS
HEART RATE: 95 BPM | BODY MASS INDEX: 37.49 KG/M2 | WEIGHT: 225 LBS | TEMPERATURE: 98.4 F | OXYGEN SATURATION: 100 % | SYSTOLIC BLOOD PRESSURE: 136 MMHG | DIASTOLIC BLOOD PRESSURE: 82 MMHG | HEIGHT: 65 IN

## 2023-03-02 DIAGNOSIS — I10 ESSENTIAL HYPERTENSION: Chronic | ICD-10-CM

## 2023-03-02 DIAGNOSIS — Z00.00 ENCOUNTER FOR MEDICAL EXAMINATION TO ESTABLISH CARE: Primary | ICD-10-CM

## 2023-03-02 DIAGNOSIS — M85.80 OSTEOPENIA, UNSPECIFIED LOCATION: ICD-10-CM

## 2023-03-02 DIAGNOSIS — E78.5 HYPERLIPIDEMIA, UNSPECIFIED HYPERLIPIDEMIA TYPE: Chronic | ICD-10-CM

## 2023-03-02 DIAGNOSIS — J45.909 MODERATE ASTHMA WITHOUT COMPLICATION, UNSPECIFIED WHETHER PERSISTENT: ICD-10-CM

## 2023-03-02 DIAGNOSIS — F07.81 POST CONCUSSIVE SYNDROME: ICD-10-CM

## 2023-03-02 DIAGNOSIS — Z86.0100 PERSONAL HISTORY OF COLONIC POLYPS: ICD-10-CM

## 2023-03-02 DIAGNOSIS — J45.40 MODERATE PERSISTENT ASTHMA WITHOUT COMPLICATION: ICD-10-CM

## 2023-03-02 DIAGNOSIS — D36.9 TUBULAR ADENOMA: ICD-10-CM

## 2023-03-02 DIAGNOSIS — G35 MS (MULTIPLE SCLEROSIS) (H): ICD-10-CM

## 2023-03-02 DIAGNOSIS — Z12.31 ENCOUNTER FOR SCREENING MAMMOGRAM FOR BREAST CANCER: ICD-10-CM

## 2023-03-02 LAB
ALBUMIN SERPL BCG-MCNC: 4.4 G/DL (ref 3.5–5.2)
ALP SERPL-CCNC: 121 U/L (ref 35–104)
ALT SERPL W P-5'-P-CCNC: 21 U/L (ref 10–35)
ANION GAP SERPL CALCULATED.3IONS-SCNC: 12 MMOL/L (ref 7–15)
AST SERPL W P-5'-P-CCNC: 28 U/L (ref 10–35)
BASOPHILS # BLD AUTO: 0.1 10E3/UL (ref 0–0.2)
BASOPHILS NFR BLD AUTO: 1 %
BILIRUB SERPL-MCNC: 0.5 MG/DL
BUN SERPL-MCNC: 16.9 MG/DL (ref 8–23)
CALCIUM SERPL-MCNC: 10.1 MG/DL (ref 8.8–10.2)
CHLORIDE SERPL-SCNC: 102 MMOL/L (ref 98–107)
CHOLEST SERPL-MCNC: 249 MG/DL
CREAT SERPL-MCNC: 0.94 MG/DL (ref 0.51–0.95)
DEPRECATED HCO3 PLAS-SCNC: 24 MMOL/L (ref 22–29)
EOSINOPHIL # BLD AUTO: 0.1 10E3/UL (ref 0–0.7)
EOSINOPHIL NFR BLD AUTO: 1 %
ERYTHROCYTE [DISTWIDTH] IN BLOOD BY AUTOMATED COUNT: 13 % (ref 10–15)
GFR SERPL CREATININE-BSD FRML MDRD: 67 ML/MIN/1.73M2
GLUCOSE SERPL-MCNC: 95 MG/DL (ref 70–99)
HCT VFR BLD AUTO: 46.9 % (ref 35–47)
HDLC SERPL-MCNC: 60 MG/DL
HGB BLD-MCNC: 15.9 G/DL (ref 11.7–15.7)
IMM GRANULOCYTES # BLD: 0 10E3/UL
IMM GRANULOCYTES NFR BLD: 0 %
LDLC SERPL CALC-MCNC: 164 MG/DL
LYMPHOCYTES # BLD AUTO: 2.3 10E3/UL (ref 0.8–5.3)
LYMPHOCYTES NFR BLD AUTO: 27 %
MCH RBC QN AUTO: 30.5 PG (ref 26.5–33)
MCHC RBC AUTO-ENTMCNC: 33.9 G/DL (ref 31.5–36.5)
MCV RBC AUTO: 90 FL (ref 78–100)
MONOCYTES # BLD AUTO: 0.7 10E3/UL (ref 0–1.3)
MONOCYTES NFR BLD AUTO: 8 %
NEUTROPHILS # BLD AUTO: 5.5 10E3/UL (ref 1.6–8.3)
NEUTROPHILS NFR BLD AUTO: 63 %
NONHDLC SERPL-MCNC: 189 MG/DL
NRBC # BLD AUTO: 0 10E3/UL
NRBC BLD AUTO-RTO: 0 /100
PLATELET # BLD AUTO: 304 10E3/UL (ref 150–450)
POTASSIUM SERPL-SCNC: 4.2 MMOL/L (ref 3.4–5.3)
PROT SERPL-MCNC: 7.2 G/DL (ref 6.4–8.3)
RBC # BLD AUTO: 5.22 10E6/UL (ref 3.8–5.2)
SODIUM SERPL-SCNC: 138 MMOL/L (ref 136–145)
TRIGL SERPL-MCNC: 127 MG/DL
WBC # BLD AUTO: 8.7 10E3/UL (ref 4–11)

## 2023-03-02 PROCEDURE — 80053 COMPREHEN METABOLIC PANEL: CPT | Mod: ZL | Performed by: FAMILY MEDICINE

## 2023-03-02 PROCEDURE — 99215 OFFICE O/P EST HI 40 MIN: CPT | Performed by: FAMILY MEDICINE

## 2023-03-02 PROCEDURE — 85014 HEMATOCRIT: CPT | Mod: ZL | Performed by: FAMILY MEDICINE

## 2023-03-02 PROCEDURE — 82306 VITAMIN D 25 HYDROXY: CPT | Mod: ZL | Performed by: FAMILY MEDICINE

## 2023-03-02 PROCEDURE — 80061 LIPID PANEL: CPT | Mod: ZL | Performed by: FAMILY MEDICINE

## 2023-03-02 PROCEDURE — 36415 COLL VENOUS BLD VENIPUNCTURE: CPT | Mod: ZL | Performed by: FAMILY MEDICINE

## 2023-03-02 PROCEDURE — G0463 HOSPITAL OUTPT CLINIC VISIT: HCPCS

## 2023-03-02 ASSESSMENT — ASTHMA QUESTIONNAIRES
QUESTION_1 LAST FOUR WEEKS HOW MUCH OF THE TIME DID YOUR ASTHMA KEEP YOU FROM GETTING AS MUCH DONE AT WORK, SCHOOL OR AT HOME: SOME OF THE TIME
QUESTION_3 LAST FOUR WEEKS HOW OFTEN DID YOUR ASTHMA SYMPTOMS (WHEEZING, COUGHING, SHORTNESS OF BREATH, CHEST TIGHTNESS OR PAIN) WAKE YOU UP AT NIGHT OR EARLIER THAN USUAL IN THE MORNING: NOT AT ALL
ACT_TOTALSCORE: 18
QUESTION_4 LAST FOUR WEEKS HOW OFTEN HAVE YOU USED YOUR RESCUE INHALER OR NEBULIZER MEDICATION (SUCH AS ALBUTEROL): ONE OR TWO TIMES PER DAY
QUESTION_5 LAST FOUR WEEKS HOW WOULD YOU RATE YOUR ASTHMA CONTROL: WELL CONTROLLED
ACT_TOTALSCORE: 18
QUESTION_2 LAST FOUR WEEKS HOW OFTEN HAVE YOU HAD SHORTNESS OF BREATH: ONCE OR TWICE A WEEK

## 2023-03-02 NOTE — PATIENT INSTRUCTIONS
Mammogram due 5/2023.  DEXA bone density scan due 12/2023.    Thyroid ultrasound due 1/2024.    Lab today - will call with results.

## 2023-03-02 NOTE — LETTER
My Asthma Action Plan    Name: Rosa Alcocer   YOB: 1960  Date: 3/2/2023   My doctor: Salma Rios MD   [facility]        My Rescue Medicine:   Albuterol inhaler (Proair/Ventolin/Proventil HFA)  2-4 puffs EVERY 4 HOURS as needed. Use a spacer if recommended by your provider.   My Asthma Severity:     Know your asthma triggers: Patient is unaware of triggers             GREEN ZONE   Good Control    I feel good    No cough or wheeze    Can work, sleep and play without asthma symptoms       Take your asthma control medicine every day.     1. If exercise triggers your asthma, take your rescue medication    15 minutes before exercise or sports, and    During exercise if you have asthma symptoms  2. Spacer to use with inhaler: If you have a spacer, make sure to use it with your inhaler             YELLOW ZONE Getting Worse  I have ANY of these:    I do not feel good    Cough or wheeze    Chest feels tight    Wake up at night   1. Keep taking your Green Zone medications  2. Start taking your rescue medicine:    every 20 minutes for up to 1 hour. Then every 4 hours for 24-48 hours.  3. If you stay in the Yellow Zone for more than 12-24 hours, contact your doctor.  4. If you do not return to the Green Zone in 12-24 hours or you get worse, start taking your oral steroid medicine if prescribed by your provider.           RED ZONE Medical Alert - Get Help  I have ANY of these:    I feel awful    Medicine is not helping    Breathing getting harder    Trouble walking or talking    Nose opens wide to breathe       1. Take your rescue medicine NOW  2. If your provider has prescribed an oral steroid medicine, start taking it NOW  3. Call your doctor NOW  4. If you are still in the Red Zone after 20 minutes and you have not reached your doctor:    Take your rescue medicine again and    Call 911 or go to the emergency room right away    See your regular doctor within 2 weeks  of an Emergency Room or Urgent Care visit for follow-up treatment.          Annual Reminders:  Meet with Asthma Educator,  Flu Shot in the Fall, consider Pneumonia Vaccination for patients with asthma (aged 19 and older).    Pharmacy:    [id number] - [place], [place] - [address] - [place] [place] - [address]    Electronically signed by Salma Rios MD   Date: 03/02/23                    Asthma Triggers  How To Control Things That Make Your Asthma Worse    Triggers are things that make your asthma worse.  Look at the list below to help you find your triggers and   what you can do about them. You can help prevent asthma flare-ups by staying away from your triggers.      Trigger                                                          What you can do   Cigarette Smoke  Tobacco smoke can make asthma worse. Do not allow smoking in your home, car or around you.  Be sure no one smokes at a child s day care or school.  If you smoke, ask your health care provider for ways to help you quit.  Ask family members to quit too.  Ask your health care provider for a referral to Quit Plan to help you quit smoking, or call 0-372-961-PLAN.     Colds, Flu, Bronchitis  These are common triggers of asthma. Wash your hands often.  Don t touch your eyes, nose or mouth.  Get a flu shot every year.     Dust Mites  These are tiny bugs that live in cloth or carpet. They are too small to see. Wash sheets and blankets in hot water every week.   Encase pillows and mattress in dust mite proof covers.  Avoid having carpet if you can. If you have carpet, vacuum weekly.   Use a dust mask and HEPA vacuum.   Pollen and Outdoor Mold  Some people are allergic to trees, grass, or weed pollen, or molds. Try to keep your windows closed.  Limit time out doors when pollen count is high.   Ask you health care provider about taking medicine during allergy season.     Animal Dander  Some people are allergic to  skin flakes, urine or saliva from pets with fur or feathers. Keep pets with fur or feathers out of your home.    If you can t keep the pet outdoors, then keep the pet out of your bedroom.  Keep the bedroom door closed.  Keep pets off cloth furniture and away from stuffed toys.     Mice, Rats, and Cockroaches  Some people are allergic to the waste from these pests.   Cover food and garbage.  Clean up spills and food crumbs.  Store grease in the refrigerator.   Keep food out of the bedroom.   Indoor Mold  This can be a trigger if your home has high moisture. Fix leaking faucets, pipes, or other sources of water.   Clean moldy surfaces.  Dehumidify basement if it is damp and smelly.   Smoke, Strong Odors, and Sprays  These can reduce air quality. Stay away from strong odors and sprays, such as perfume, powder, hair spray, paints, smoke incense, paint, cleaning products, candles and new carpet.   Exercise or Sports  Some people with asthma have this trigger. Be active!  Ask your doctor about taking medicine before sports or exercise to prevent symptoms.    Warm up for 5-10 minutes before and after sports or exercise.     Other Triggers of Asthma  Cold air:  Cover your nose and mouth with a scarf.  Sometimes laughing or crying can be a trigger.  Some medicines and food can trigger asthma.

## 2023-03-02 NOTE — Clinical Note
Can you locate pap records form [facility] from 2022?? Or refax release? On 2022 records at all.

## 2023-03-05 LAB — DEPRECATED CALCIDIOL+CALCIFEROL SERPL-MC: 43 UG/L (ref 20–75)

## 2023-03-06 ENCOUNTER — TELEPHONE (OUTPATIENT)
Dept: FAMILY MEDICINE | Facility: OTHER | Age: 63
End: 2023-03-06

## 2023-03-06 DIAGNOSIS — D58.2 ELEVATED HEMOGLOBIN (H): Primary | ICD-10-CM

## 2023-03-06 NOTE — TELEPHONE ENCOUNTER
Patient notified of lab results, declined liptor. Will recheck hemoglobin in 3 months.  Lab apt scheduled.  Order pending for hemoglobin  AURORA VILLALOBOS LPN

## 2023-03-20 ENCOUNTER — TELEPHONE (OUTPATIENT)
Dept: MAMMOGRAPHY | Facility: OTHER | Age: 63
End: 2023-03-20

## 2023-03-20 ENCOUNTER — HOSPITAL ENCOUNTER (OUTPATIENT)
Dept: BONE DENSITY | Facility: HOSPITAL | Age: 63
Discharge: HOME | End: 2023-03-20
Attending: FAMILY MEDICINE
Payer: MEDICARE

## 2023-03-20 ENCOUNTER — ANCILLARY PROCEDURE (OUTPATIENT)
Dept: MAMMOGRAPHY | Facility: OTHER | Age: 63
End: 2023-03-20
Attending: FAMILY MEDICINE
Payer: MEDICARE

## 2023-03-20 DIAGNOSIS — Z12.31 ENCOUNTER FOR SCREENING MAMMOGRAM FOR BREAST CANCER: ICD-10-CM

## 2023-03-20 DIAGNOSIS — M85.80 OSTEOPENIA, UNSPECIFIED LOCATION: ICD-10-CM

## 2023-03-20 PROCEDURE — 77080 DXA BONE DENSITY AXIAL: CPT

## 2023-03-20 PROCEDURE — 77067 SCR MAMMO BI INCL CAD: CPT | Mod: TC

## 2023-05-03 ENCOUNTER — TELEPHONE (OUTPATIENT)
Dept: FAMILY MEDICINE | Facility: OTHER | Age: 63
End: 2023-05-03

## 2023-05-03 DIAGNOSIS — I10 ESSENTIAL HYPERTENSION: Chronic | ICD-10-CM

## 2023-05-03 DIAGNOSIS — J45.40 MODERATE PERSISTENT ASTHMA WITHOUT COMPLICATION: ICD-10-CM

## 2023-05-03 RX ORDER — ALBUTEROL SULFATE 90 UG/1
2 AEROSOL, METERED RESPIRATORY (INHALATION) EVERY 4 HOURS PRN
Qty: 18 G | Refills: 3 | Status: SHIPPED | OUTPATIENT
Start: 2023-05-03 | End: 2024-08-20

## 2023-05-03 RX ORDER — THEOPHYLLINE 400 MG/1
400 TABLET, EXTENDED RELEASE ORAL DAILY
Qty: 90 TABLET | Refills: 0 | Status: SHIPPED | OUTPATIENT
Start: 2023-05-03 | End: 2023-08-09

## 2023-05-03 RX ORDER — RAMIPRIL 10 MG/1
10 CAPSULE ORAL DAILY
Qty: 90 CAPSULE | Refills: 0 | Status: SHIPPED | OUTPATIENT
Start: 2023-05-03 | End: 2023-08-09

## 2023-05-03 NOTE — TELEPHONE ENCOUNTER
11:25 AM    Reason for Call: OVERBOOK    Patient is having the following symptoms: pain in R hip. burning feeling for 1 weeks.    The patient is requesting an appointment for soon with Dr. Masterson.    Was an appointment offered for this call? No  If yes : Appointment type              Date    Preferred method for responding to this message: Telephone Call  What is your phone number ? [phone]    If we cannot reach you directly, may we leave a detailed response at the number you provided? Yes    Can this message wait until your PCP/provider returns, if unavailable today? No,     Vivian Oneill

## 2023-05-04 ENCOUNTER — ANCILLARY PROCEDURE (OUTPATIENT)
Dept: GENERAL RADIOLOGY | Facility: OTHER | Age: 63
End: 2023-05-04
Attending: FAMILY MEDICINE
Payer: MEDICARE

## 2023-05-04 ENCOUNTER — OFFICE VISIT (OUTPATIENT)
Dept: FAMILY MEDICINE | Facility: OTHER | Age: 63
End: 2023-05-04
Attending: FAMILY MEDICINE
Payer: MEDICARE

## 2023-05-04 VITALS
HEART RATE: 85 BPM | HEIGHT: 65 IN | DIASTOLIC BLOOD PRESSURE: 80 MMHG | OXYGEN SATURATION: 96 % | WEIGHT: 220 LBS | BODY MASS INDEX: 36.65 KG/M2 | SYSTOLIC BLOOD PRESSURE: 134 MMHG | TEMPERATURE: 98.4 F

## 2023-05-04 DIAGNOSIS — I10 ESSENTIAL HYPERTENSION: Chronic | ICD-10-CM

## 2023-05-04 DIAGNOSIS — J45.40 MODERATE PERSISTENT ASTHMA WITHOUT COMPLICATION: ICD-10-CM

## 2023-05-04 DIAGNOSIS — M85.80 OSTEOPENIA, UNSPECIFIED LOCATION: ICD-10-CM

## 2023-05-04 DIAGNOSIS — G89.29 CHRONIC BILATERAL LOW BACK PAIN, UNSPECIFIED WHETHER SCIATICA PRESENT: ICD-10-CM

## 2023-05-04 DIAGNOSIS — M54.50 CHRONIC BILATERAL LOW BACK PAIN, UNSPECIFIED WHETHER SCIATICA PRESENT: ICD-10-CM

## 2023-05-04 DIAGNOSIS — M15.0 PRIMARY OSTEOARTHRITIS INVOLVING MULTIPLE JOINTS: ICD-10-CM

## 2023-05-04 DIAGNOSIS — M25.551 HIP PAIN, RIGHT: Primary | ICD-10-CM

## 2023-05-04 DIAGNOSIS — M25.551 HIP PAIN, RIGHT: ICD-10-CM

## 2023-05-04 DIAGNOSIS — Z91.81 AT HIGH RISK FOR FALLS: ICD-10-CM

## 2023-05-04 PROCEDURE — 73502 X-RAY EXAM HIP UNI 2-3 VIEWS: CPT | Mod: TC

## 2023-05-04 PROCEDURE — G0463 HOSPITAL OUTPT CLINIC VISIT: HCPCS

## 2023-05-04 PROCEDURE — 72100 X-RAY EXAM L-S SPINE 2/3 VWS: CPT | Mod: TC

## 2023-05-04 PROCEDURE — 99213 OFFICE O/P EST LOW 20 MIN: CPT | Performed by: FAMILY MEDICINE

## 2023-05-04 RX ORDER — THEOPHYLLINE 400 MG/1
400 TABLET, EXTENDED RELEASE ORAL DAILY
Qty: 90 TABLET | Refills: 0 | Status: CANCELLED | OUTPATIENT
Start: 2023-05-04

## 2023-05-04 RX ORDER — RAMIPRIL 10 MG/1
10 CAPSULE ORAL DAILY
Qty: 90 CAPSULE | Refills: 0 | Status: CANCELLED | OUTPATIENT
Start: 2023-05-04

## 2023-05-04 ASSESSMENT — ASTHMA QUESTIONNAIRES
ACT_TOTALSCORE: 19
QUESTION_3 LAST FOUR WEEKS HOW OFTEN DID YOUR ASTHMA SYMPTOMS (WHEEZING, COUGHING, SHORTNESS OF BREATH, CHEST TIGHTNESS OR PAIN) WAKE YOU UP AT NIGHT OR EARLIER THAN USUAL IN THE MORNING: NOT AT ALL
QUESTION_4 LAST FOUR WEEKS HOW OFTEN HAVE YOU USED YOUR RESCUE INHALER OR NEBULIZER MEDICATION (SUCH AS ALBUTEROL): ONCE A WEEK OR LESS
QUESTION_5 LAST FOUR WEEKS HOW WOULD YOU RATE YOUR ASTHMA CONTROL: WELL CONTROLLED
QUESTION_2 LAST FOUR WEEKS HOW OFTEN HAVE YOU HAD SHORTNESS OF BREATH: ONCE A DAY
QUESTION_1 LAST FOUR WEEKS HOW MUCH OF THE TIME DID YOUR ASTHMA KEEP YOU FROM GETTING AS MUCH DONE AT WORK, SCHOOL OR AT HOME: A LITTLE OF THE TIME
ACT_TOTALSCORE: 19

## 2023-05-04 NOTE — PATIENT INSTRUCTIONS
Will call with xray results.  Then consider injection.  Also consider orthopedic consultation.  Handicap parking permit completed.  Script for cane sent to [employer].

## 2023-05-04 NOTE — PROGRESS NOTES
"  Assessment & Plan     Hip pain, right  Known prior arthritis - xrays not available - remote.  Updated today- formal read pending.  Then consider injection vs ortho consult.    Need for MRI potentially discussed.  - XR Hip Right 2-3 Views; Future  - Miscellaneous Order for DME - ONLY FOR DME    Chronic bilateral low back pain, unspecified whether sciatica present  As above.   Arthritis.  Possibility of radicular component.  - XR Lumbar Spine 2/3 Views; Future  - Miscellaneous Order for DME - ONLY FOR DME    Moderate persistent asthma without complication  Stable.    Essential hypertension  Stable.    At high risk for falls  Script for cane.  - Miscellaneous Order for DME - ONLY FOR DME    Primary osteoarthritis involving multiple joints  As above.  - Miscellaneous Order for DME - ONLY FOR DME    Osteopenia, unspecified location  Up to date on dexa.     BMI:   Estimated body mass index is 36.61 kg/m  as calculated from the following:    Height as of this encounter: 1.651 m (5' 5\").    Weight as of this encounter: 99.8 kg (220 lb).   Weight management plan: Discussed healthy diet and exercise guidelines    See Patient Instructions    No follow-ups on file.    Salma Rios MD  [facility] [place]VET Lee is a 62 year old, presenting for the following health issues:  right hip pain       HPI     Pain History:  When did you first notice your pain? Over a year ago; wax and wane; most recent flare a week ago  Have you seen this provider for your pain in the past? No   Where in your body do your have pain? Right hip pain   Are you seeing anyone else for your pain? No  What makes your pain better? Ice packs  What makes your pain worse? Walking alot  How has pain affected your ability to work? Not applicable  Who lives in your household? Self  Anterior hip crease - severe at times; sometimes laterally, but only by hip.  Not below knee or up back.  Sometimes feels weak.  No falls.  Itches " "and stings.  Gets hot. But no rash, bruising, blisters.   Xray guided injection 2 years ago.  [facility].  No surgery advised at that time.  Left SI joint injection 10/2022.    Osteopenia - dexa 3/2023.  FRAX 7.5%/0.6%.    Requesting cane. Worried about falling.   disability parking permit application completed.      Review of Systems   Constitutional, HEENT, cardiovascular, pulmonary, gi and gu systems are negative, except as otherwise noted.      Objective    /80 (BP Location: Left arm, Patient Position: Sitting, Cuff Size: Adult Regular)   Pulse 85   Temp 98.4  F (36.9  C) (Tympanic)   Ht 1.651 m (5' 5\")   Wt 99.8 kg (220 lb)   SpO2 96%   BMI 36.61 kg/m    Body mass index is 36.61 kg/m .  Physical Exam   GENERAL: alert, no distress and over weight  RESP: lungs clear to auscultation - no rales, rhonchi or wheezes  CV: regular rate and rhythm, normal S1 S2, no S3 or S4, no murmur, click or rub, no peripheral edema and peripheral pulses strong  ABDOMEN: soft, nontender, no hepatosplenomegaly, no masses and bowel sounds normal  MS: normal muscle tone, no edema, peripheral pulses normal, tenderness to palpation left lower para spinal and left hip with limited flexion and internal/external rotation with discomfort in groin; SLR negative  SKIN: no suspicious lesions or rashes  NEURO: Normal strength and tone, mentation intact and speech normal  PSYCH: mentation appears normal, affect normal/bright    No results found for this or any previous visit (from the past 24 hour(s)).                "

## 2023-05-05 ENCOUNTER — TELEPHONE (OUTPATIENT)
Dept: FAMILY MEDICINE | Facility: OTHER | Age: 63
End: 2023-05-05

## 2023-05-05 DIAGNOSIS — R93.89 ABNORMAL MRI: ICD-10-CM

## 2023-05-05 DIAGNOSIS — M25.551 HIP PAIN, RIGHT: Primary | ICD-10-CM

## 2023-05-05 DIAGNOSIS — M54.50 CHRONIC BILATERAL LOW BACK PAIN, UNSPECIFIED WHETHER SCIATICA PRESENT: ICD-10-CM

## 2023-05-05 DIAGNOSIS — G89.29 CHRONIC BILATERAL LOW BACK PAIN, UNSPECIFIED WHETHER SCIATICA PRESENT: ICD-10-CM

## 2023-05-05 DIAGNOSIS — N83.202 CYST OF LEFT OVARY: ICD-10-CM

## 2023-05-05 NOTE — TELEPHONE ENCOUNTER
----- Message from Salma Masterson MD sent at 5/4/2023  5:03 PM CDT -----  Notify of results - hip xray is negative/normal; spine xray with arthritis L5/S1 and SI joint arthritis/bone spurs.  Would advise MRI hip/spine to better evaluate prior to steroid injection.  Pend orders once notified.

## 2023-05-19 ENCOUNTER — HOSPITAL ENCOUNTER (OUTPATIENT)
Dept: MRI IMAGING | Facility: HOSPITAL | Age: 63
Discharge: HOME | End: 2023-05-19
Attending: FAMILY MEDICINE | Admitting: FAMILY MEDICINE
Payer: MEDICARE

## 2023-05-19 DIAGNOSIS — M54.50 CHRONIC BILATERAL LOW BACK PAIN, UNSPECIFIED WHETHER SCIATICA PRESENT: ICD-10-CM

## 2023-05-19 DIAGNOSIS — M25.551 HIP PAIN, RIGHT: ICD-10-CM

## 2023-05-19 DIAGNOSIS — G89.29 CHRONIC BILATERAL LOW BACK PAIN, UNSPECIFIED WHETHER SCIATICA PRESENT: ICD-10-CM

## 2023-05-19 PROCEDURE — 72148 MRI LUMBAR SPINE W/O DYE: CPT | Mod: MF

## 2023-05-19 PROCEDURE — G1010 CDSM STANSON: HCPCS

## 2023-05-19 PROCEDURE — 73721 MRI JNT OF LWR EXTRE W/O DYE: CPT | Mod: RT,MF

## 2023-06-06 ENCOUNTER — LAB (OUTPATIENT)
Dept: LAB | Facility: OTHER | Age: 63
End: 2023-06-06
Payer: MEDICARE

## 2023-06-06 DIAGNOSIS — D58.2 ELEVATED HEMOGLOBIN (H): ICD-10-CM

## 2023-06-06 LAB
BASOPHILS # BLD AUTO: 0.1 10E3/UL (ref 0–0.2)
BASOPHILS NFR BLD AUTO: 1 %
EOSINOPHIL # BLD AUTO: 0.2 10E3/UL (ref 0–0.7)
EOSINOPHIL NFR BLD AUTO: 3 %
ERYTHROCYTE [DISTWIDTH] IN BLOOD BY AUTOMATED COUNT: 13.3 % (ref 10–15)
HCT VFR BLD AUTO: 45.1 % (ref 35–47)
HGB BLD-MCNC: 15.4 G/DL (ref 11.7–15.7)
IMM GRANULOCYTES # BLD: 0 10E3/UL
IMM GRANULOCYTES NFR BLD: 0 %
LYMPHOCYTES # BLD AUTO: 1.8 10E3/UL (ref 0.8–5.3)
LYMPHOCYTES NFR BLD AUTO: 28 %
MCH RBC QN AUTO: 30 PG (ref 26.5–33)
MCHC RBC AUTO-ENTMCNC: 34.1 G/DL (ref 31.5–36.5)
MCV RBC AUTO: 88 FL (ref 78–100)
MONOCYTES # BLD AUTO: 0.7 10E3/UL (ref 0–1.3)
MONOCYTES NFR BLD AUTO: 11 %
NEUTROPHILS # BLD AUTO: 3.7 10E3/UL (ref 1.6–8.3)
NEUTROPHILS NFR BLD AUTO: 57 %
NRBC # BLD AUTO: 0 10E3/UL
NRBC BLD AUTO-RTO: 0 /100
PLATELET # BLD AUTO: 258 10E3/UL (ref 150–450)
RBC # BLD AUTO: 5.14 10E6/UL (ref 3.8–5.2)
WBC # BLD AUTO: 6.4 10E3/UL (ref 4–11)

## 2023-06-06 PROCEDURE — 36415 COLL VENOUS BLD VENIPUNCTURE: CPT | Mod: ZL

## 2023-06-06 PROCEDURE — 85004 AUTOMATED DIFF WBC COUNT: CPT | Mod: ZL

## 2023-06-07 ENCOUNTER — HOSPITAL ENCOUNTER (OUTPATIENT)
Dept: ULTRASOUND IMAGING | Facility: HOSPITAL | Age: 63
Discharge: HOME | End: 2023-06-07
Attending: FAMILY MEDICINE | Admitting: FAMILY MEDICINE
Payer: MEDICARE

## 2023-06-07 DIAGNOSIS — R93.89 ABNORMAL MRI: ICD-10-CM

## 2023-06-07 DIAGNOSIS — N84.1 CERVICAL POLYP: ICD-10-CM

## 2023-06-07 DIAGNOSIS — R93.89 ABNORMAL PELVIC ULTRASOUND: Primary | ICD-10-CM

## 2023-06-07 DIAGNOSIS — N83.202 CYST OF LEFT OVARY: ICD-10-CM

## 2023-06-07 PROCEDURE — 76830 TRANSVAGINAL US NON-OB: CPT

## 2023-06-08 ENCOUNTER — MEDICAL CORRESPONDENCE (OUTPATIENT)
Dept: INTERVENTIONAL RADIOLOGY/VASCULAR | Facility: HOSPITAL | Age: 63
End: 2023-06-08

## 2023-06-15 ENCOUNTER — HOSPITAL ENCOUNTER (OUTPATIENT)
Dept: INTERVENTIONAL RADIOLOGY/VASCULAR | Facility: HOSPITAL | Age: 63
Discharge: HOME | End: 2023-06-15
Attending: ORTHOPAEDIC SURGERY
Payer: MEDICARE

## 2023-06-15 ENCOUNTER — HOSPITAL ENCOUNTER (OUTPATIENT)
Facility: HOSPITAL | Age: 63
Discharge: HOME | End: 2023-06-15
Attending: RADIOLOGY | Admitting: RADIOLOGY
Payer: MEDICARE

## 2023-06-15 DIAGNOSIS — M19.90 DJD (DEGENERATIVE JOINT DISEASE): ICD-10-CM

## 2023-06-15 DIAGNOSIS — M19.90 OSTEOARTHRITIS: ICD-10-CM

## 2023-06-15 DIAGNOSIS — R52 PAIN: ICD-10-CM

## 2023-06-15 PROCEDURE — 255N000002 HC RX 255 OP 636: Performed by: RADIOLOGY

## 2023-06-15 PROCEDURE — 250N000009 HC RX 250: Performed by: RADIOLOGY

## 2023-06-15 PROCEDURE — 250N000011 HC RX IP 250 OP 636: Performed by: RADIOLOGY

## 2023-06-15 PROCEDURE — 77002 NEEDLE LOCALIZATION BY XRAY: CPT

## 2023-06-15 RX ORDER — TRIAMCINOLONE ACETONIDE 40 MG/ML
40 INJECTION, SUSPENSION INTRA-ARTICULAR; INTRAMUSCULAR ONCE
Status: COMPLETED | OUTPATIENT
Start: 2023-06-15 | End: 2023-06-15

## 2023-06-15 RX ORDER — IOPAMIDOL 612 MG/ML
50 INJECTION, SOLUTION INTRAVASCULAR ONCE
Status: COMPLETED | OUTPATIENT
Start: 2023-06-15 | End: 2023-06-15

## 2023-06-15 RX ORDER — LIDOCAINE HYDROCHLORIDE 10 MG/ML
10 INJECTION, SOLUTION EPIDURAL; INFILTRATION; INTRACAUDAL; PERINEURAL ONCE
Status: COMPLETED | OUTPATIENT
Start: 2023-06-15 | End: 2023-06-15

## 2023-06-15 RX ADMIN — TRIAMCINOLONE ACETONIDE 40 MG: 40 INJECTION, SUSPENSION INTRA-ARTICULAR; INTRAMUSCULAR at 14:49

## 2023-06-15 RX ADMIN — IOPAMIDOL 5 ML: 612 INJECTION, SOLUTION INTRAVENOUS at 14:50

## 2023-06-15 RX ADMIN — LIDOCAINE HYDROCHLORIDE 7 ML: 10 INJECTION, SOLUTION EPIDURAL; INFILTRATION; INTRACAUDAL; PERINEURAL at 14:49

## 2023-06-15 NOTE — DISCHARGE INSTRUCTIONS
Cell number on file:    Telephone Information:   Mobile [phone]     Is it ok to leave a message at this number(s)? Yes    Dr Adkins completed your procedure on 6/15/2023.    Current Pain Level (0-10 Scale): 9/10  Post Pain Level (0-10):  1/10    Radiology Discharge instructions for Steroid Injection    Activity Level:     Do not do any heavy activity or exercise for 24 hours.   Do not drive for 4 hours after your injection.  Diet:   Return to your normal diet.  Medications:   If you have stopped taking your Aspirin, Coumadin/Warfarin, Ibuprofen, or any   other blood thinner for this procedure you may resume in the morning unless   your primary care provider has given you other instructions.    Diabetics may see an increase in blood sugar after steroid injections. If you are concerned about your blood sugar, please contact your family doctor.    Site Care:  Remove the bandage and bathe or shower the morning after the procedure.      Please allow two weeks to experience improvement in your pain.  If you have any further issues, please contact your provider.    Call your Primary Care Provider if you have the following (if your primary care provider is not available please seek emergency care):   Nausea with vomiting   Severe headache   Drowsiness or confusion   Redness or drainage at the injection or puncture site   Temperature over 101 degrees F   Other concerns   Worsening back pain   Stiff neck

## 2023-06-20 ENCOUNTER — OFFICE VISIT (OUTPATIENT)
Dept: OBGYN | Facility: OTHER | Age: 63
End: 2023-06-20
Attending: OBSTETRICS & GYNECOLOGY
Payer: MEDICARE

## 2023-06-20 VITALS
DIASTOLIC BLOOD PRESSURE: 87 MMHG | HEIGHT: 65 IN | BODY MASS INDEX: 36.65 KG/M2 | SYSTOLIC BLOOD PRESSURE: 143 MMHG | OXYGEN SATURATION: 98 % | HEART RATE: 113 BPM | WEIGHT: 220 LBS

## 2023-06-20 DIAGNOSIS — Z12.4 SCREENING FOR CERVICAL CANCER: Primary | ICD-10-CM

## 2023-06-20 DIAGNOSIS — N83.8 OVARIAN MASS: ICD-10-CM

## 2023-06-20 DIAGNOSIS — N84.1 ENDOCERVICAL POLYP: ICD-10-CM

## 2023-06-20 DIAGNOSIS — R19.00 PELVIC MASS: ICD-10-CM

## 2023-06-20 PROCEDURE — G0123 SCREEN CERV/VAG THIN LAYER: HCPCS | Mod: ZL | Performed by: OBSTETRICS & GYNECOLOGY

## 2023-06-20 PROCEDURE — 86304 IMMUNOASSAY TUMOR CA 125: CPT | Mod: ZL | Performed by: OBSTETRICS & GYNECOLOGY

## 2023-06-20 PROCEDURE — G0463 HOSPITAL OUTPT CLINIC VISIT: HCPCS

## 2023-06-20 PROCEDURE — 87624 HPV HI-RISK TYP POOLED RSLT: CPT | Mod: ZL | Performed by: OBSTETRICS & GYNECOLOGY

## 2023-06-20 PROCEDURE — 99203 OFFICE O/P NEW LOW 30 MIN: CPT | Performed by: OBSTETRICS & GYNECOLOGY

## 2023-06-20 PROCEDURE — 36415 COLL VENOUS BLD VENIPUNCTURE: CPT | Mod: ZL | Performed by: OBSTETRICS & GYNECOLOGY

## 2023-06-20 ASSESSMENT — PAIN SCALES - GENERAL: PAINLEVEL: NO PAIN (0)

## 2023-06-21 PROBLEM — E66.812 CLASS 2 SEVERE OBESITY DUE TO EXCESS CALORIES WITH SERIOUS COMORBIDITY IN ADULT (H): Status: ACTIVE | Noted: 2023-06-21

## 2023-06-21 PROBLEM — E66.01 CLASS 2 SEVERE OBESITY DUE TO EXCESS CALORIES WITH SERIOUS COMORBIDITY IN ADULT (H): Status: ACTIVE | Noted: 2023-06-21

## 2023-06-21 LAB — CANCER AG125 SERPL-ACNC: 16 U/ML

## 2023-06-21 NOTE — PROGRESS NOTES
CC:  Consult from  Dr. Rios for ovarian cyst on US  HPI:  Rosa Alcocer is a 63 year old female is a   P0.   Menopause was at age 40.  Pt was undergoing work-up/imaging for hip pain and left adnexal cysts were noted on MRI.  F/u US showed several small simple cysts arising from left ovary largest 2.8 cm.  Normal endometrial thickness.  Possible endocervical polyp.  Pt denies bleeding, pelvic pain, abnormal vaginal discharge.     Patients records are available and reviewed at today's visit.    Past GYN history: cervical polyp removal.   Abnormal pap: No  Vaginitis symptoms;  No  HRT:  No         Last PAP smear:  2015 nml    Past Medical History:   Diagnosis Date     Asthma      Benign essential hypertension      Colon cancer (H) 2008     Osteopenia     2023 frax 7.5/0.6%     Post concussive syndrome      Thyroid nodule     4; repeat annual ultrsound       No past surgical history on file.    Family History   Problem Relation Age of Onset     Alzheimer Disease Mother      Alcoholism Mother      Myocardial Infarction Mother      Thrombosis Mother      Cerebrovascular Disease Father      Hearing Loss Father      Myocardial Infarction Father      Valvular heart disease Brother      Cerebrovascular Disease Brother      Cancer Brother      Hypertension Brother      Skin Cancer Brother      Osteopenia Brother      Hypertension Sister      Diabetes Sister      Valvular heart disease Sister      Aortic Valve Replacement Sister      Hypertension Sister      Breast Cancer Sister      Macular Degeneration Sister      Hypertension Sister      Osteopenia Sister        Allergies: Codeine, Fentanyl, Meloxicam, No clinical screening - see comments, Prochlorperazine, Shellfish allergy, Amlodipine, Cyclobenzaprine, Losartan, Fluticasone, and Budesonide-formoterol fumarate    Current Outpatient Medications   Medication Sig Dispense Refill     albuterol (PROAIR HFA/PROVENTIL HFA/VENTOLIN HFA) 108 (90 Base) MCG/ACT inhaler Inhale 2  "puffs into the lungs every 4 hours as needed for shortness of breath or wheezing 18 g 3     aspirin (ASA) 81 MG EC tablet Take 81 mg by mouth       Calcium Citrate-Vitamin D 250-200 MG-UNIT TABS Take 1 tablet by mouth Takes 600mg       cetirizine (ZYRTEC) 10 MG tablet Take by mouth daily as needed       cholecalciferol (VITAMIN D3) 25 mcg (1000 units) capsule        fish oil-omega-3 fatty acids 1000 MG capsule        folic acid (FOLVITE) 400 MCG tablet        Lutein 10 MG TABS Take 20 mg by mouth daily       magnesium 100 MG CAPS Take 1 tablet by mouth daily       mometasone (NASONEX) 50 MCG/ACT nasal spray Spray 2 sprays in nostril daily 17 g 3     potassium gluconate 2.5 MEQ TABS Take 1 tablet by mouth every morning       Probiotic Product (PROBIOTIC-10 PO) Take 3 mg by mouth daily       ramipril (ALTACE) 10 MG capsule Take 1 capsule (10 mg) by mouth daily 90 capsule 0     theophylline (UNIPHYL) 400 MG 24 hr tablet Take 1 tablet (400 mg) by mouth daily 90 tablet 0     vitamin A 3 MG (00005 UNITS) capsule Take 2,000 Units by mouth       Zinc 50 MG CAPS        acetaminophen (TYLENOL) 500 MG tablet Take 1,000 mg by mouth (Patient not taking: Reported on 6/20/2023)       Cyanocobalamin 50 MCG TABS Take 1 tablet by mouth daily (Patient not taking: Reported on 6/20/2023)         ROS:  CONSTITUTIONAL: NEGATIVE for fever, chills, change in weight  GI: NEGATIVE for nausea, abdominal pain, heartburn, or change in bowel habits  : NEGATIVE for frequency, dysuria, hematuria, vaginal discharge      EXAM:  Blood pressure (!) 143/87, pulse 113, height 1.651 m (5' 5\"), weight 99.8 kg (220 lb), SpO2 98 %.   BMI= Body mass index is 36.61 kg/m .  General - pleasant female in no acute distress.  Abdomen - soft, nontender, nondistended, no hepatosplenomegaly.  Pelvic - EG: normal adult female, BUS: within normal limits, Vagina: , no discharge, Cervix: no lesions or CMT, Uterus: firm, normal sized and nontender, Adnexae: no masses.  " Slight left adnexal TTP  Rectovaginal - deferred.  Musculoskeletal - no gross deformities.  Neurological - normal strength, sensation, and mental status.     Study Result    Narrative & Impression   Exam: US PELVIC TRANSABDOMINAL AND TRANSVAGINAL.     Exam reason: Abnormal MRI; Cyst of left ovary     Comparison: 5/19/2023.     Technique: Gray scale transabdominal and endovaginal exam was  performed.      FINDINGS:     Uterus: Anteverted position.  Uterus measures 5.1 x 3.6 x 2.6 cm.  There is a somewhat polypoid structure in the endocervical canal with  internal vascularity measuring up to 10 mm in size. No other focal  uterine abnormalities.     Endometrium:  Endometrial thickness is 2 mm.  No focal endometrial  abnormality.      Right ovary: The right ovary is not visualized. No right adnexal  masses demonstrated.     Left ovary: Left ovary is 4.1 x 3.6 x 3.1 cm. Left ovarian volume is  24.1 cc. There are multiple simple appearing cystic structures within  the left ovary, the largest of which measures up to 2.8 cm. No  definite internal vascularity. Normal color and Doppler evaluation of  the left ovary.     Pelvic fluid: None.         ASSESSMENT/PLAN:  (Z12.4) Screening for cervical cancer  (primary encounter diagnosis)  Comment: Inadequate prior screening, endocervical lesion on US  Plan: A pap thin layer screen with  HPV - recommended        age 30 - 65 years          (R19.00) Ovarian cyst, endocervical polyp (not visible on exam)  Comment: Cystic left ovary.Simple appearing small ovarian cysts.  Discussed these are usually always benign but do recommend Ca-125 and f/u US for further evaluation.  Discussed survellance or  surgical management options with laparoscopic BSO, D and C/hysteroscopy for definitive treatment and pathologic diagnosis.  Pt prefers surveillance and f/u US ordered in 3 months.    Plan: , US Pelvic Complete with Transvaginal    I will see her aback after US to discuss results and  POC.   Pt has my card and phone number to call as needed if problems in the interim or she does not hear her results.             Bakari Lott MD

## 2023-06-26 LAB
BKR LAB AP GYN ADEQUACY: NORMAL
BKR LAB AP GYN INTERPRETATION: NORMAL
BKR LAB AP HPV REFLEX: NORMAL
BKR LAB AP PREVIOUS ABNORMAL: NORMAL
PATH REPORT.COMMENTS IMP SPEC: NORMAL
PATH REPORT.COMMENTS IMP SPEC: NORMAL
PATH REPORT.RELEVANT HX SPEC: NORMAL

## 2023-06-27 LAB
HUMAN PAPILLOMA VIRUS 16 DNA: NEGATIVE
HUMAN PAPILLOMA VIRUS 18 DNA: NEGATIVE
HUMAN PAPILLOMA VIRUS FINAL DIAGNOSIS: NORMAL
HUMAN PAPILLOMA VIRUS OTHER HR: NEGATIVE

## 2023-07-24 ENCOUNTER — MEDICAL CORRESPONDENCE (OUTPATIENT)
Dept: CT IMAGING | Facility: HOSPITAL | Age: 63
End: 2023-07-24

## 2023-07-27 ENCOUNTER — HOSPITAL ENCOUNTER (OUTPATIENT)
Dept: CT IMAGING | Facility: HOSPITAL | Age: 63
Discharge: HOME | End: 2023-07-27
Attending: STUDENT IN AN ORGANIZED HEALTH CARE EDUCATION/TRAINING PROGRAM | Admitting: STUDENT IN AN ORGANIZED HEALTH CARE EDUCATION/TRAINING PROGRAM
Payer: COMMERCIAL

## 2023-07-27 DIAGNOSIS — Z01.818 PREOP EXAMINATION: ICD-10-CM

## 2023-07-27 PROCEDURE — 72192 CT PELVIS W/O DYE: CPT

## 2023-08-07 DIAGNOSIS — I10 ESSENTIAL HYPERTENSION: Chronic | ICD-10-CM

## 2023-08-07 DIAGNOSIS — J45.40 MODERATE PERSISTENT ASTHMA WITHOUT COMPLICATION: ICD-10-CM

## 2023-08-08 DIAGNOSIS — J45.40 MODERATE PERSISTENT ASTHMA WITHOUT COMPLICATION: ICD-10-CM

## 2023-08-08 DIAGNOSIS — I10 ESSENTIAL HYPERTENSION: Chronic | ICD-10-CM

## 2023-08-08 NOTE — TELEPHONE ENCOUNTER
Ramipril 10mg  Last Written Prescription Date:  5/03/2023  Last Fill Quantity: 90,   # refills: 0  Last Office Visit: 5/04/2023  Future Office visit:    Next 5 appointments (look out 90 days)      Aug 30, 2023  1:30 PM  (Arrive by 1:15 PM)  Pre-Op physical with JOSUE Cano CNP  [facility] [place] ([facility] [place] ) [address]  [place] [address]  [phone]     Sep 19, 2023  1:30 PM  (Arrive by 1:15 PM)  SHORT with Bakari Lott MD  [facility] [place] ([facility] [place] ) [address]  [place] [address]  [phone]             Routing refill request to provider for review/approval because:  Drug not on the [facility], [facility] or [facility] refill protocol or controlled substance  Theophylline 400mg  Last Written Prescription Date:  5/03/2023  Last Fill Quantity: 90,   # refills: 0  Last Office Visit: 5/04/2023  Future Office visit:    Next 5 appointments (look out 90 days)      Aug 30, 2023  1:30 PM  (Arrive by 1:15 PM)  Pre-Op physical with JOSUE Cano CNP  [facility] [place] ([facility] [place] ) [address]  [place] [address]  [phone]     Sep 19, 2023  1:30 PM  (Arrive by 1:15 PM)  SHORT with Bakari Lott MD  [facility] [place] ([facility] [place] ) [address]  [place] [address]  [phone]             Routing refill request to provider for review/approval because:  Drug not on the [facility], [facility] or [facility] refill protocol or controlled substance

## 2023-08-09 RX ORDER — RAMIPRIL 10 MG/1
10 CAPSULE ORAL DAILY
Qty: 90 CAPSULE | Refills: 2 | Status: SHIPPED | OUTPATIENT
Start: 2023-08-09 | End: 2024-04-22

## 2023-08-09 RX ORDER — THEOPHYLLINE 400 MG/1
400 TABLET, EXTENDED RELEASE ORAL DAILY
Qty: 90 TABLET | Refills: 0 | OUTPATIENT
Start: 2023-08-09

## 2023-08-09 RX ORDER — THEOPHYLLINE 400 MG/1
400 TABLET, EXTENDED RELEASE ORAL DAILY
Qty: 90 TABLET | Refills: 2 | Status: SHIPPED | OUTPATIENT
Start: 2023-08-09 | End: 2024-04-22

## 2023-08-09 RX ORDER — RAMIPRIL 10 MG/1
10 CAPSULE ORAL DAILY
Qty: 90 CAPSULE | Refills: 0 | OUTPATIENT
Start: 2023-08-09

## 2023-08-09 NOTE — TELEPHONE ENCOUNTER
Theophylline      Last Written Prescription Date:  05/03/23  Last Fill Quantity: 90,   # refills: 0  Last Office Visit: 05/04/23  Future Office visit:    Next 5 appointments (look out 90 days)      Aug 30, 2023  1:30 PM  (Arrive by 1:15 PM)  Pre-Op physical with JOSUE Cano CNP  [facility] [place] ([facility] [place] ) [address]  [place] [address]  [phone]     Sep 19, 2023  1:30 PM  (Arrive by 1:15 PM)  SHORT with Bakari Lott MD  [facility] [place] ([facility] [place] ) [address]  [place] [address]  [phone]             Altace      Last Written Prescription Date:  05/03/23  Last Fill Quantity: 90,   # refills: 0  Last Office Visit: 05/04/23  Future Office visit:    Next 5 appointments (look out 90 days)      Aug 30, 2023  1:30 PM  (Arrive by 1:15 PM)  Pre-Op physical with JOSUE Cano CNP  [facility] [place] ([facility] [place] ) [address]  [place] [address]  [phone]     Sep 19, 2023  1:30 PM  (Arrive by 1:15 PM)  SHORT with Bakari Lott MD  [facility] [place] ([facility] [place] ) [address]  [place] [address]  [phone]

## 2023-08-28 NOTE — PROGRESS NOTES
[facility] [place]  [address]  [facility] [place] [address]  Phone: [phone]  Primary Provider: Salma Masterson  Pre-op Performing Provider: MARISELA AWAN      PREOPERATIVE EVALUATION:  Today's date: 8/30/2023    Rosa Alcocer is a 63 year old female who presents for a preoperative evaluation.      Surgical Information:  Surgery/Procedure: Plate IS joint   Surgery Location: [facility]    Surgeon: Dr. Palomino   Surgery Date: 09/05/23  Time of Surgery: TBD  Where patient plans to recover: At home with family  Fax number for surgical facility: [facility]    Assessment & Plan     The proposed surgical procedure is considered INTERMEDIATE risk.    Preop general physical exam  Cleared for procedure   - CBC with platelets and differential; Future  - Basic metabolic panel; Future  - EKG 12-lead complete w/read - (Clinic Performed)  - CBC with platelets and differential  - Basic metabolic panel    Chronic bilateral low back pain, unspecified whether sciatica present  Reason for procedure            Risks and Recommendations:  The patient has the following additional risks and recommendations for perioperative complications:  Cardiovascular:   - she can perform 4 METs walking around the house , takes care of herself, can do light house work, can climb a flight of stairs without symptoms   -consider ECHO for LVH, but does not need to be completed before procedure   Pulmonary:    - Incentive spirometry post-op    Antiplatelet or Anticoagulation Medication Instructions:   - aspirin: Discontinue aspirin 7-10 days prior to procedure to reduce bleeding risk. It should be resumed postoperatively.     Additional Medication Instructions:  Patient is to take all scheduled medications on the day of surgery EXCEPT for modifications listed below:  Hold vitamin 1-2 weeks before procedure    - ACE/ARB: HOLD on day of surgery (minimum 11 hours for general anesthesia).   - rescue Inhaler: Continue PRN. Bring to  hospital on the day of surgery.   - theophylline: Hold the evening before surgery.     RECOMMENDATION:  APPROVAL GIVEN to proceed with proposed procedure, without further diagnostic evaluation.    Ordering of each unique test  Prescription drug management  I spent a total of 49 minutes on the day of the visit.   Time spent by me doing chart review, history and exam, documentation and further activities per the note      Subjective       HPI related to upcoming procedure: fall in 2019 with continued pain in her lower back           8/30/2023    12:59 PM   Preop Questions   1. Have you ever had a heart attack or stroke? No   2. Have you ever had surgery on your heart or blood vessels, such as a stent placement, a coronary artery bypass, or surgery on an artery in your head, neck, heart, or legs? No   3. Do you have chest pain with activity? No   4. Do you have a history of  heart failure? No   5. Do you currently have a cold, bronchitis or symptoms of other infection? No   6. Do you have a cough, shortness of breath, or wheezing? YES - due to the smoke outdoors she coughs   7. Do you or anyone in your family have previous history of blood clots? Sister - with breast cancer    8. Do you or does anyone in your family have a serious bleeding problem such as prolonged bleeding following surgeries or cuts? No   9. Have you ever had problems with anemia or been told to take iron pills? No   10. Have you had any abnormal blood loss such as black, tarry or bloody stools, or abnormal vaginal bleeding? No   11. Have you ever had a blood transfusion? No   12. Are you willing to have a blood transfusion if it is medically needed before, during, or after your surgery? Yes   13. Have you or any of your relatives ever had problems with anesthesia? YES - with Fenytal does not wake up   14. Do you have sleep apnea, excessive snoring or daytime drowsiness? No   15. Do you have any artifical heart valves or other implanted medical  devices like a pacemaker, defibrillator, or continuous glucose monitor? No   16. Do you have artificial joints? No   17. Are you allergic to latex? No     Health Care Directive:  Patient does not have a Health Care Directive or Living Will: Discussed advance care planning with patient; however, patient declined at this time.    Preoperative Review of :   reviewed - no record of controlled substances prescribed.      Status of Chronic Conditions:  See problem list for active medical problems.  Problems all longstanding and stable, except as noted/documented.  See ROS for pertinent symptoms related to these conditions.    ASTHMA - Patient has a longstanding history of moderate-severe Asthma . Patient has been doing well overall noting only if out in the smoke and continues on medication regimen consisting of albuterol inhaler as needed, mometosone nasal spray, theophylline  without adverse reactions or side effects.     HYPERTENSION - Patient has longstanding history of HTN , currently denies any symptoms referable to elevated blood pressure. Specifically denies chest pain, palpitations, dyspnea, orthopnea, PND or peripheral edema. Blood pressure readings have been in normal range. Current medication regimen is as listed below. Patient denies any side effects of medication.     Review of Systems  CONSTITUTIONAL: NEGATIVE for fever, chills, change in weight  INTEGUMENTARY/SKIN: NEGATIVE for worrisome rashes, moles or lesions  EYES: NEGATIVE for vision changes or irritation  ENT/MOUTH: NEGATIVE for ear, mouth and throat problems  RESP: NEGATIVE for significant cough or SOB  CV: NEGATIVE for chest pain, palpitations or peripheral edema  GI: NEGATIVE for nausea, abdominal pain, heartburn, or change in bowel habits  : NEGATIVE for frequency, dysuria, or hematuria  MUSCULOSKELETAL:back pain   NEURO: falls easily   ENDOCRINE: NEGATIVE for temperature intolerance, skin/hair changes  HEME: NEGATIVE for bleeding  problems  PSYCHIATRIC: NEGATIVE for changes in mood or affect    Patient Active Problem List    Diagnosis Date Noted    Class 2 severe obesity due to excess calories with serious comorbidity in adult (H) 06/21/2023     Priority: Medium    Osteopenia 03/02/2023     Priority: Medium    Asthma 03/02/2023     Priority: Medium    Esophagitis 10/13/2022     Priority: Medium    Essential hypertension 10/13/2022     Priority: Medium    Hyperlipidemia 10/13/2022     Priority: Medium    MS (multiple sclerosis) (H) 10/13/2022     Priority: Medium     history of multiple sclerosis, but has been in remission for years, not on current treatment.        Thyroid nodule 10/13/2022     Priority: Medium     Jan 2022 - stable nodules with recommendations to recheck in 1 year.        Class 2 obesity due to excess calories without serious comorbidity with body mass index (BMI) of 36.0 to 36.9 in adult 10/13/2022     Priority: Medium    Lumbar spondylosis 06/08/2022     Priority: Medium     S/p Left Radiofrequency percutaneous neurotomy of the L3, L4 medial branches and the L5 dorsal ramus, to cover the facet joints of L4/L5 and L5/S1.         History of osteomyelitis 01/06/2022     Priority: Medium     Of jaw, secondary to tooth abscess. S/p IV abx and PICC. Had 3 surgeries and lost 7 teeth. Follow up with ID as needed.      Post concussive syndrome 12/21/2020     Priority: Medium     history of a fall at work on12/26/2019, and sustained a traumatic brain injury and has chronic headaches and postconcussive syndrome. She was taking the garbage out and hit a patch of ice and hit her buttock and her head. Did do occupational therapy and see neurooptics and had prism glasses.        Vasomotor rhinitis 06/29/2018     Priority: Medium    Diverticulosis 07/25/2017     Priority: Medium    Personal history of colonic polyps 07/25/2017     Priority: Medium     2/24/22 - rpt in five years       Dysphonia 10/21/2014     Priority: Medium    Moderate  persistent asthma without complication 06/18/2012     Priority: Medium     on theophylline. She was seen by Dr. Moreno for her asthma in the past and this was recommended by him. Her symptoms are tirggered by heat and humidity. She uses ventolin about once per day and feels symptoms.        Tubular adenoma 03/09/2009     Priority: Medium     On colonoscopy 2009.  Repeat colonoscopy in 1 year per GI Recommendations (see note).        Past Medical History:   Diagnosis Date    Asthma     Benign essential hypertension     Colon cancer (H) 2008    Osteopenia     2023 frax 7.5/0.6%    Post concussive syndrome     Thyroid nodule     4; repeat annual ultrsound     No past surgical history on file.  Current Outpatient Medications   Medication Sig Dispense Refill    albuterol (PROAIR HFA/PROVENTIL HFA/VENTOLIN HFA) 108 (90 Base) MCG/ACT inhaler Inhale 2 puffs into the lungs every 4 hours as needed for shortness of breath or wheezing 18 g 3    cetirizine (ZYRTEC) 10 MG tablet Take by mouth daily as needed      folic acid (FOLVITE) 400 MCG tablet       Lutein 10 MG TABS Take 20 mg by mouth daily      magnesium 100 MG CAPS Take 1 tablet by mouth daily      mometasone (NASONEX) 50 MCG/ACT nasal spray Spray 2 sprays in nostril daily 17 g 3    potassium gluconate 2.5 MEQ TABS Take 1 tablet by mouth every morning      Probiotic Product (PROBIOTIC-10 PO) Take 3 mg by mouth daily      ramipril (ALTACE) 10 MG capsule TAKE 1 CAPSULE BY MOUTH DAILY 90 capsule 2    theophylline (UNIPHYL) 400 MG 24 hr tablet TAKE 1 TABLET BY MOUTH DAILY 90 tablet 2    Zinc 50 MG CAPS       acetaminophen (TYLENOL) 500 MG tablet Take 1,000 mg by mouth (Patient not taking: Reported on 6/20/2023)      aspirin (ASA) 81 MG EC tablet Take 81 mg by mouth (Patient not taking: Reported on 8/30/2023)      Calcium Citrate-Vitamin D 250-200 MG-UNIT TABS Take 1 tablet by mouth Takes 600mg (Patient not taking: Reported on 8/30/2023)      cholecalciferol (VITAMIN D3) 25  mcg (1000 units) capsule  (Patient not taking: Reported on 2023)      Cyanocobalamin 50 MCG TABS Take 1 tablet by mouth daily (Patient not taking: Reported on 2023)      fish oil-omega-3 fatty acids 1000 MG capsule  (Patient not taking: Reported on 2023)      vitamin A 3 MG (18097 UNITS) capsule Take 2,000 Units by mouth (Patient not taking: Reported on 2023)         Allergies   Allergen Reactions    Codeine Shortness Of Breath and Swelling    Fentanyl Other (See Comments)     Bradycardia & Hypotension    Meloxicam Other (See Comments)     Hypersensitive, SOB, insomnia, HTN    No Clinical Screening - See Comments Other (See Comments) and Shortness Of Breath     MOLD, GRASSES. Some cleaning products, perfumes, tar,  Skunk scent    Prochlorperazine Hives, Shortness Of Breath and Swelling     COMPAZINE      Shellfish Allergy Hives, Shortness Of Breath and Swelling    Amlodipine Other (See Comments)     Hip pain and edema.    Cyclobenzaprine Other (See Comments)     Jittery, leg cramps, insomnia,     Losartan Cough, Itching and Muscle Pain (Myalgia)    Fluticasone Swelling    Budesonide-Formoterol Fumarate Other (See Comments) and Palpitations     Leg pains        Social History     Tobacco Use    Smoking status: Former     Packs/day: 2.00     Years: 14.00     Pack years: 28.00     Types: Cigarettes     Start date: 1970     Quit date: 1977     Years since quittin.6    Smokeless tobacco: Never   Substance Use Topics    Alcohol use: Not Currently     Family History   Problem Relation Age of Onset    Alzheimer Disease Mother     Alcoholism Mother     Myocardial Infarction Mother     Thrombosis Mother     Cerebrovascular Disease Father     Hearing Loss Father     Myocardial Infarction Father     Valvular heart disease Brother     Cerebrovascular Disease Brother     Cancer Brother     Hypertension Brother     Skin Cancer Brother     Osteopenia Brother     Hypertension Sister     Diabetes  "Sister     Valvular heart disease Sister     Aortic Valve Replacement Sister     Hypertension Sister     Breast Cancer Sister     Macular Degeneration Sister     Hypertension Sister     Osteopenia Sister      History   Drug Use Unknown         Objective     /87 (BP Location: Left arm, Patient Position: Sitting, Cuff Size: Adult Regular)   Pulse 94   Temp 98.3  F (36.8  C) (Tympanic)   Ht 1.651 m (5' 5\")   Wt 103 kg (227 lb 1.6 oz)   SpO2 98%   BMI 37.79 kg/m      Physical Exam    GENERAL APPEARANCE: alert, active, and no distress     EYES: EOMI, PERRL     HENT: ear canals and TM's normal and nose and mouth without ulcers or lesions     NECK: no adenopathy, no asymmetry, masses, or scars and thyroid normal to palpation     RESP: lungs clear to auscultation - no rales, rhonchi or wheezes     CV: regular rates and rhythm, normal S1 S2, no S3 or S4 and no murmur, click or rub     ABDOMEN:  soft, nontender, no HSM or masses and bowel sounds normal     MS: extremities normal- no gross deformities noted, no evidence of inflammation in joints, FROM in all extremities.     SKIN: no suspicious lesions or rashes     NEURO: Normal strength and tone, sensory exam grossly normal, mentation intact and speech normal     PSYCH: mentation appears normal. and affect normal/bright     LYMPHATICS: No cervical adenopathy    Recent Labs   Lab Test 06/06/23  1434 03/02/23  1452 10/13/22  1442   HGB 15.4 15.9*  --     304  --    NA  --  138 142   POTASSIUM  --  4.2 4.6   CR  --  0.94 1.01*        Diagnostics:  Recent Results (from the past 24 hour(s))   Basic metabolic panel    Collection Time: 08/30/23  1:19 PM   Result Value Ref Range    Sodium 140 136 - 145 mmol/L    Potassium 4.2 3.4 - 5.3 mmol/L    Chloride 104 98 - 107 mmol/L    Carbon Dioxide (CO2) 23 22 - 29 mmol/L    Anion Gap 13 7 - 15 mmol/L    Urea Nitrogen 15.6 8.0 - 23.0 mg/dL    Creatinine 0.91 0.51 - 0.95 mg/dL    Calcium 9.7 8.8 - 10.2 mg/dL    Glucose " 102 (H) 70 - 99 mg/dL    GFR Estimate 70 >60 mL/min/1.73m2   CBC with platelets and differential    Collection Time: 08/30/23  1:19 PM   Result Value Ref Range    WBC Count 6.4 4.0 - 11.0 10e3/uL    RBC Count 5.03 3.80 - 5.20 10e6/uL    Hemoglobin 14.7 11.7 - 15.7 g/dL    Hematocrit 44.3 35.0 - 47.0 %    MCV 88 78 - 100 fL    MCH 29.2 26.5 - 33.0 pg    MCHC 33.2 31.5 - 36.5 g/dL    RDW 13.2 10.0 - 15.0 %    Platelet Count 273 150 - 450 10e3/uL    % Neutrophils 56 %    % Lymphocytes 30 %    % Monocytes 10 %    % Eosinophils 3 %    % Basophils 1 %    % Immature Granulocytes 0 %    NRBCs per 100 WBC 0 <1 /100    Absolute Neutrophils 3.6 1.6 - 8.3 10e3/uL    Absolute Lymphocytes 1.9 0.8 - 5.3 10e3/uL    Absolute Monocytes 0.6 0.0 - 1.3 10e3/uL    Absolute Eosinophils 0.2 0.0 - 0.7 10e3/uL    Absolute Basophils 0.0 0.0 - 0.2 10e3/uL    Absolute Immature Granulocytes 0.0 <=0.4 10e3/uL    Absolute NRBCs 0.0 10e3/uL      EKG: appears normal, NSR, possible left atrial enlargement, RBBB, left ventriclar hypertrophy, inferior infarct, age undetermined, there are no prior tracings available    Revised Cardiac Risk Index (RCRI):  The patient has the following serious cardiovascular risks for perioperative complications:   - No serious cardiac risks = 0 points     RCRI Interpretation: 1 point: Class II (low risk - 0.9% complication rate)         Signed Electronically by: JOSUE Mena CNP  Copy of this evaluation report is provided to requesting physician.

## 2023-08-30 ENCOUNTER — OFFICE VISIT (OUTPATIENT)
Dept: FAMILY MEDICINE | Facility: OTHER | Age: 63
End: 2023-08-30
Attending: NURSE PRACTITIONER
Payer: COMMERCIAL

## 2023-08-30 VITALS
SYSTOLIC BLOOD PRESSURE: 138 MMHG | DIASTOLIC BLOOD PRESSURE: 87 MMHG | HEART RATE: 94 BPM | WEIGHT: 227.1 LBS | OXYGEN SATURATION: 98 % | BODY MASS INDEX: 37.84 KG/M2 | HEIGHT: 65 IN | TEMPERATURE: 98.3 F

## 2023-08-30 DIAGNOSIS — Z01.818 PREOP GENERAL PHYSICAL EXAM: Primary | ICD-10-CM

## 2023-08-30 DIAGNOSIS — G89.29 CHRONIC BILATERAL LOW BACK PAIN, UNSPECIFIED WHETHER SCIATICA PRESENT: ICD-10-CM

## 2023-08-30 DIAGNOSIS — M54.50 CHRONIC BILATERAL LOW BACK PAIN, UNSPECIFIED WHETHER SCIATICA PRESENT: ICD-10-CM

## 2023-08-30 LAB
ANION GAP SERPL CALCULATED.3IONS-SCNC: 13 MMOL/L (ref 7–15)
BASOPHILS # BLD AUTO: 0 10E3/UL (ref 0–0.2)
BASOPHILS NFR BLD AUTO: 1 %
BUN SERPL-MCNC: 15.6 MG/DL (ref 8–23)
CALCIUM SERPL-MCNC: 9.7 MG/DL (ref 8.8–10.2)
CHLORIDE SERPL-SCNC: 104 MMOL/L (ref 98–107)
CREAT SERPL-MCNC: 0.91 MG/DL (ref 0.51–0.95)
DEPRECATED HCO3 PLAS-SCNC: 23 MMOL/L (ref 22–29)
EOSINOPHIL # BLD AUTO: 0.2 10E3/UL (ref 0–0.7)
EOSINOPHIL NFR BLD AUTO: 3 %
ERYTHROCYTE [DISTWIDTH] IN BLOOD BY AUTOMATED COUNT: 13.2 % (ref 10–15)
GFR SERPL CREATININE-BSD FRML MDRD: 70 ML/MIN/1.73M2
GLUCOSE SERPL-MCNC: 102 MG/DL (ref 70–99)
HCT VFR BLD AUTO: 44.3 % (ref 35–47)
HGB BLD-MCNC: 14.7 G/DL (ref 11.7–15.7)
IMM GRANULOCYTES # BLD: 0 10E3/UL
IMM GRANULOCYTES NFR BLD: 0 %
LYMPHOCYTES # BLD AUTO: 1.9 10E3/UL (ref 0.8–5.3)
LYMPHOCYTES NFR BLD AUTO: 30 %
MCH RBC QN AUTO: 29.2 PG (ref 26.5–33)
MCHC RBC AUTO-ENTMCNC: 33.2 G/DL (ref 31.5–36.5)
MCV RBC AUTO: 88 FL (ref 78–100)
MONOCYTES # BLD AUTO: 0.6 10E3/UL (ref 0–1.3)
MONOCYTES NFR BLD AUTO: 10 %
NEUTROPHILS # BLD AUTO: 3.6 10E3/UL (ref 1.6–8.3)
NEUTROPHILS NFR BLD AUTO: 56 %
NRBC # BLD AUTO: 0 10E3/UL
NRBC BLD AUTO-RTO: 0 /100
PLATELET # BLD AUTO: 273 10E3/UL (ref 150–450)
POTASSIUM SERPL-SCNC: 4.2 MMOL/L (ref 3.4–5.3)
RBC # BLD AUTO: 5.03 10E6/UL (ref 3.8–5.2)
SODIUM SERPL-SCNC: 140 MMOL/L (ref 136–145)
WBC # BLD AUTO: 6.4 10E3/UL (ref 4–11)

## 2023-08-30 PROCEDURE — 93010 ELECTROCARDIOGRAM REPORT: CPT | Mod: 77 | Performed by: INTERNAL MEDICINE

## 2023-08-30 PROCEDURE — 99215 OFFICE O/P EST HI 40 MIN: CPT | Performed by: NURSE PRACTITIONER

## 2023-08-30 PROCEDURE — G0463 HOSPITAL OUTPT CLINIC VISIT: HCPCS

## 2023-08-30 PROCEDURE — 93005 ELECTROCARDIOGRAM TRACING: CPT | Performed by: NURSE PRACTITIONER

## 2023-08-30 PROCEDURE — 80048 BASIC METABOLIC PNL TOTAL CA: CPT | Mod: ZL | Performed by: NURSE PRACTITIONER

## 2023-08-30 PROCEDURE — G0463 HOSPITAL OUTPT CLINIC VISIT: HCPCS | Mod: 25

## 2023-08-30 PROCEDURE — 36415 COLL VENOUS BLD VENIPUNCTURE: CPT | Mod: ZL | Performed by: NURSE PRACTITIONER

## 2023-08-30 PROCEDURE — 85025 COMPLETE CBC W/AUTO DIFF WBC: CPT | Mod: ZL | Performed by: NURSE PRACTITIONER

## 2023-08-30 ASSESSMENT — PAIN SCALES - GENERAL: PAINLEVEL: SEVERE PAIN (7)

## 2023-08-30 NOTE — PATIENT INSTRUCTIONS
For informational purposes only. Not to replace the advice of your health care provider. Copyright   2003,  [employer]. All rights reserved. Clinically reviewed by Rossi Beverly MD. qualifyor [id number] - REV .  Preparing for Your Surgery  Getting started  A nurse will call you to review your health history and instructions. They will give you an arrival time based on your scheduled surgery time. Please be ready to share:  Your doctor's clinic name and phone number  Your medical, surgical, and anesthesia history  A list of allergies and sensitivities  A list of medicines, including herbal treatments and over-the-counter drugs  Whether the patient has a legal guardian (ask how to send us the papers in advance)  Please tell us if you're pregnant--or if there's any chance you might be pregnant. Some surgeries may injure a fetus (unborn baby), so they require a pregnancy test. Surgeries that are safe for a fetus don't always need a test, and you can choose whether to have one.   If you have a child who's having surgery, please ask for a copy of Preparing for Your Child's Surgery.    Preparing for surgery  Within 10 to 30 days of surgery: Have a pre-op exam (sometimes called an H&P, or History and Physical). This can be done at a clinic or pre-operative center.  If you're having a , you may not need this exam. Talk to your care team.  At your pre-op exam, talk to your care team about all medicines you take. If you need to stop any medicines before surgery, ask when to start taking them again.  We do this for your safety. Many medicines can make you bleed too much during surgery. Some change how well surgery (anesthesia) drugs work.  Call your insurance company to let them know you're having surgery. (If you don't have insurance, call [phone].)  Call your clinic if there's any change in your health. This includes signs of a cold or flu (sore throat, runny nose, cough, rash, fever). It also  includes a scrape or scratch near the surgery site.  If you have questions on the day of surgery, call your hospital or surgery center.  Eating and drinking guidelines  For your safety: Unless your surgeon tells you otherwise, follow the guidelines below.  Eat and drink as usual until 8 hours before you arrive for surgery. After that, no food or milk.  Drink clear liquids until 2 hours before you arrive. These are liquids you can see through, like water, Gatorade, and Propel Water. They also include plain black coffee and tea (no cream or milk), candy, and breath mints. You can spit out gum when you arrive.  If you drink alcohol: Stop drinking it the night before surgery.  If your care team tells you to take medicine on the morning of surgery, it's okay to take it with a sip of water.  Preventing infection  Shower or bathe the night before and morning of your surgery. Follow the instructions your clinic gave you. (If no instructions, use regular soap.)  Don't shave or clip hair near your surgery site. We'll remove the hair if needed.  Don't smoke or vape the morning of surgery. You may chew nicotine gum up to 2 hours before surgery. A nicotine patch is okay.  Note: Some surgeries require you to completely quit smoking and nicotine. Check with your surgeon.  Your care team will make every effort to keep you safe from infection. We will:  Clean our hands often with soap and water (or an alcohol-based hand rub).  Clean the skin at your surgery site with a special soap that kills germs.  Give you a special gown to keep you warm. (Cold raises the risk of infection.)  Wear special hair covers, masks, gowns and gloves during surgery.  Give antibiotic medicine, if prescribed. Not all surgeries need antibiotics.  What to bring on the day of surgery  Photo ID and insurance card  Copy of your health care directive, if you have one  Glasses and hearing aids (bring cases)  You can't wear contacts during surgery  Inhaler and eye  drops, if you use them (tell us about these when you arrive)  CPAP machine or breathing device, if you use them  A few personal items, if spending the night  If you have . . .  A pacemaker, ICD (cardiac defibrillator) or other implant: Bring the ID card.  An implanted stimulator: Bring the remote control.  A legal guardian: Bring a copy of the certified (court-stamped) guardianship papers.  Please remove any jewelry, including body piercings. Leave jewelry and other valuables at home.  If you're going home the day of surgery  You must have a responsible adult drive you home. They should stay with you overnight as well.  If you don't have someone to stay with you, and you aren't safe to go home alone, we may keep you overnight. Insurance often won't pay for this.  After surgery  If it's hard to control your pain or you need more pain medicine, please call your surgeon's office.  Questions?   If you have any questions for your care team, list them here: _________________________________________________________________________________________________________________________________________________________________________ ____________________________________ ____________________________________ ____________________________________    How to Take Your Medication Before Surgery  - HOLD (do not take) Aspirin for 7-10 days  - STOP taking all vitamins and herbal supplements 14 days before surgery.  - hold theophylline and ramipril morning of procedure

## 2023-08-31 ENCOUNTER — HOSPITAL ENCOUNTER (OUTPATIENT)
Dept: ULTRASOUND IMAGING | Facility: HOSPITAL | Age: 63
Discharge: HOME | End: 2023-08-31
Attending: OBSTETRICS & GYNECOLOGY | Admitting: OBSTETRICS & GYNECOLOGY
Payer: COMMERCIAL

## 2023-08-31 DIAGNOSIS — R19.00 PELVIC MASS: ICD-10-CM

## 2023-08-31 DIAGNOSIS — N83.8 OVARIAN MASS: ICD-10-CM

## 2023-08-31 PROCEDURE — 76830 TRANSVAGINAL US NON-OB: CPT

## 2023-09-01 ENCOUNTER — TELEPHONE (OUTPATIENT)
Dept: FAMILY MEDICINE | Facility: OTHER | Age: 63
End: 2023-09-01

## 2023-09-19 ENCOUNTER — OFFICE VISIT (OUTPATIENT)
Dept: OBGYN | Facility: OTHER | Age: 63
End: 2023-09-19
Attending: OBSTETRICS & GYNECOLOGY
Payer: COMMERCIAL

## 2023-09-19 VITALS
SYSTOLIC BLOOD PRESSURE: 128 MMHG | DIASTOLIC BLOOD PRESSURE: 82 MMHG | WEIGHT: 224 LBS | OXYGEN SATURATION: 96 % | HEART RATE: 106 BPM | BODY MASS INDEX: 37.32 KG/M2 | HEIGHT: 65 IN

## 2023-09-19 DIAGNOSIS — N83.8 OVARIAN MASS: Primary | ICD-10-CM

## 2023-09-19 DIAGNOSIS — N84.1 ENDOCERVICAL POLYP: ICD-10-CM

## 2023-09-19 PROCEDURE — G0463 HOSPITAL OUTPT CLINIC VISIT: HCPCS

## 2023-09-19 PROCEDURE — 99213 OFFICE O/P EST LOW 20 MIN: CPT | Performed by: OBSTETRICS & GYNECOLOGY

## 2023-09-19 RX ORDER — METHOCARBAMOL 750 MG/1
750 TABLET, FILM COATED ORAL 4 TIMES DAILY
Status: ON HOLD | COMMUNITY
Start: 2023-09-05 | End: 2023-12-13

## 2023-09-19 ASSESSMENT — PAIN SCALES - GENERAL: PAINLEVEL: SEVERE PAIN (6)

## 2023-09-19 NOTE — PROGRESS NOTES
S:  F/up ovarian cysts, endocervical polyp.      HPI:  Rosa Alcocer is a 63 year old female is a   P0.   Menopause was at age 40.  Pt was undergoing work-up/imaging for hip pain and left adnexal cysts were noted on MRI.  F/u US showed several small simple cysts arising from left ovary largest 2.8 cm.  Normal endometrial thickness.  Possible endocervical polyp.  Pt denies bleeding, pelvic pain, abnormal vaginal discharge.      Patients records are available.      She had a f/up US and presents today for discussion of  results and POC.  She is recovering from spinal surgery 2 weeks ago.      Pap UTD.          Patient Active Problem List   Diagnosis    History of osteomyelitis    Diverticulosis    Esophagitis    Essential hypertension    Moderate persistent asthma without complication    Hyperlipidemia    Lumbar spondylosis    MS (multiple sclerosis) (H)    Personal history of colonic polyps    Tubular adenoma    Vasomotor rhinitis    Post concussive syndrome    Thyroid nodule    Dysphonia    Class 2 obesity due to excess calories without serious comorbidity with body mass index (BMI) of 36.0 to 36.9 in adult    Osteopenia    Asthma    Class 2 severe obesity due to excess calories with serious comorbidity in adult (H)            Past Medical History:   Diagnosis Date    Asthma     Benign essential hypertension     Colon cancer (H)     Osteopenia      frax 7.5/0.6%    Post concussive syndrome     Thyroid nodule     4; repeat annual ultrsound          No past surgical history on file.         Social History     Tobacco Use    Smoking status: Former     Packs/day: 2.00     Years: 14.00     Pack years: 28.00     Types: Cigarettes     Start date: 1970     Quit date: 1977     Years since quittin.7    Smokeless tobacco: Never   Substance Use Topics    Alcohol use: Not Currently            Family History   Problem Relation Age of Onset    Alzheimer Disease Mother     Alcoholism Mother     Myocardial  Infarction Mother     Thrombosis Mother     Cerebrovascular Disease Father     Hearing Loss Father     Myocardial Infarction Father     Valvular heart disease Brother     Cerebrovascular Disease Brother     Cancer Brother     Hypertension Brother     Skin Cancer Brother     Osteopenia Brother     Hypertension Sister     Diabetes Sister     Valvular heart disease Sister     Aortic Valve Replacement Sister     Hypertension Sister     Breast Cancer Sister     Macular Degeneration Sister     Hypertension Sister     Osteopenia Sister                Allergies   Allergen Reactions    Codeine Shortness Of Breath and Swelling    Fentanyl Other (See Comments)     Bradycardia & Hypotension    Meloxicam Other (See Comments)     Hypersensitive, SOB, insomnia, HTN    No Clinical Screening - See Comments Other (See Comments) and Shortness Of Breath     MOLD, GRASSES. Some cleaning products, perfumes, tar,  Skunk scent    Prochlorperazine Hives, Shortness Of Breath and Swelling     COMPAZINE      Shellfish Allergy Hives, Shortness Of Breath and Swelling    Amlodipine Other (See Comments)     Hip pain and edema.    Cyclobenzaprine Other (See Comments)     Jittery, leg cramps, insomnia,     Losartan Cough, Itching and Muscle Pain (Myalgia)    Fluticasone Swelling    Budesonide-Formoterol Fumarate Other (See Comments) and Palpitations     Leg pains            Current Outpatient Medications   Medication Sig Dispense Refill    acetaminophen (TYLENOL) 500 MG tablet Take 1,000 mg by mouth      albuterol (PROAIR HFA/PROVENTIL HFA/VENTOLIN HFA) 108 (90 Base) MCG/ACT inhaler Inhale 2 puffs into the lungs every 4 hours as needed for shortness of breath or wheezing 18 g 3    Calcium Citrate-Vitamin D 250-200 MG-UNIT TABS Take 1 tablet by mouth Takes 600mg      cetirizine (ZYRTEC) 10 MG tablet Take by mouth daily as needed      cholecalciferol (VITAMIN D3) 25 mcg (1000 units) capsule       fish oil-omega-3 fatty acids 1000 MG capsule        "folic acid (FOLVITE) 400 MCG tablet       Lutein 10 MG TABS Take 20 mg by mouth daily      magnesium 100 MG CAPS Take 1 tablet by mouth daily      methocarbamol (ROBAXIN) 750 MG tablet Take 750 mg by mouth 4 times daily      mometasone (NASONEX) 50 MCG/ACT nasal spray Spray 2 sprays in nostril daily 17 g 3    potassium gluconate 2.5 MEQ TABS Take 1 tablet by mouth every morning      Probiotic Product (PROBIOTIC-10 PO) Take 3 mg by mouth daily      ramipril (ALTACE) 10 MG capsule TAKE 1 CAPSULE BY MOUTH DAILY 90 capsule 2    theophylline (UNIPHYL) 400 MG 24 hr tablet TAKE 1 TABLET BY MOUTH DAILY 90 tablet 2    vitamin A 3 MG (15107 UNITS) capsule Take 2,000 Units by mouth      Zinc 50 MG CAPS       aspirin (ASA) 81 MG EC tablet Take 81 mg by mouth (Patient not taking: Reported on 8/30/2023)      Cyanocobalamin 50 MCG TABS Take 1 tablet by mouth daily (Patient not taking: Reported on 6/20/2023)            Review Of Systems  Constitutional:  Denies fever  GI/ negative except as noted per hpi    O:   /82 (BP Location: Right arm, Cuff Size: Adult Large)   Pulse 106   Ht 1.638 m (5' 4.5\")   Wt 101.6 kg (224 lb)   SpO2 96%   BMI 37.86 kg/m    Gen:  NAD, A and O        PROCEDURE: US PELVIC TRANSABDOMINAL AND TRANSVAGINAL 8/31/2023 12:37  PM     HISTORY: due in 3 months around 9/12/23.; Pelvic mass; Ovarian mass     COMPARISONS: CT dated the 7/27/2023 and prior ultrasound dated  6/7/2023.     TECHNIQUE: Transabdominal and endovaginal imaging.     FINDINGS: Uterus measures 5.2 x 2.0 x 2.6 cm. No focal fibroid is  seen. There is a masslike area within the endocervical canal with  significant vascularity. Appearance is worrisome for an endocervical  polyp. Similar appearance was seen previously.     Right ovary is not visualized.     Left ovary measures 5.5 x 3.2 x 3.9 cm. Multiple follicles are seen on  the ovary. Posterior to the ovary there is a simple appearing cyst  which measures 2.7 x 2.6 x 2.4 cm. No solid " components or nodularity  is seen.                                                                           IMPRESSION:   1. Probable endocervical polyp. GYN consultation was recommended at  the time of the prior exam.  2. 2.7 cm left adnexal cyst without suspicious characteristics.  Recommend follow-up in one year.            A:  Stable but non-resolving  left ovarian cysts, endocervical polyp    P:  Discussed options of continued surveillance, D and C/hysteroscopy for cervical polyp removal and biopsy, and possible laparoscopic BSO for definitive treatment, diagnosis. Reviewed goals, risks, alternatives for procedure;  Including risk of bleeding, transfusion, infection, damage to nerves, blood vessels, bowel and bladder, blood clots, pneumonia, and other rare or unforseen complications.   Discussed recovery period and expected discomfort.. All questions were answered.      Pt would like to think about her options and heal from her spinal surgery.  She .  Has my card and phone number and will call to schedule accordingly.

## 2023-09-26 ENCOUNTER — PREP FOR PROCEDURE (OUTPATIENT)
Dept: OBGYN | Facility: OTHER | Age: 63
End: 2023-09-26

## 2023-09-26 DIAGNOSIS — N84.1 ENDOCERVICAL POLYP: Primary | ICD-10-CM

## 2023-09-26 DIAGNOSIS — N83.209 OVARIAN CYST: ICD-10-CM

## 2023-11-27 NOTE — H&P (VIEW-ONLY)
[facility] [place]  [address]  [place] [address]  Phone: [phone]  Primary Provider: Salma Masterson  Pre-op Performing Provider: SALMA MASTERSON      PREOPERATIVE EVALUATION:  Today's date: 11/29/2023    Rosa is a 63 year old, presenting for the following:  Pre-Op Exam      Surgical Information:  Surgery/Procedure: Hysteroscopy with dilation and curettage of uterus; possible laparoscopic BSO  Surgery Location: [facility] [place]  Surgeon: Dr. Lott  Surgery Date: 12/13/2023  Time of Surgery: TBD  Where patient plans to recover: At home alone  Fax number for surgical facility: Note does not need to be faxed, will be available electronically in Epic.    Assessment & Plan     The proposed surgical procedure is considered INTERMEDIATE risk.    Preop general physical exam  Proceed as planned.  - CBC with platelets and differential; Future  - Basic metabolic panel; Future  - CBC with platelets and differential  - Basic metabolic panel    Cyst of left ovary  As above.  - CBC with platelets and differential; Future  - Basic metabolic panel; Future  - CBC with platelets and differential  - Basic metabolic panel    Endocervical polyp  As above.    Moderate persistent asthma without complication  Stable.    Obesity (BMI 30-39.9)    Hyperlipidemia, unspecified hyperlipidemia type  Declines statin.  The 10-year ASCVD risk score (Lolly DK, et al., 2019) is: 7.2%    Values used to calculate the score:      Age: 63 years      Sex: Female      Is Non- : No      Diabetic: No      Tobacco smoker: No      Systolic Blood Pressure: 121 mmHg      Is BP treated: Yes      HDL Cholesterol: 60 mg/dL      Total Cholesterol: 249 mg/dL      Essential hypertension  Stable.  Continue Altace 10 mg      Thyroid nodule  Surveillance; annual US; TSH updated today  - TSH with free T4 reflex; Future  - TSH with free T4 reflex    Osteopenia, unspecified location  DEXA up to date    Arthritis of right  hip  Injection not beneficial.  Referral to ortho for consideration of joint replacement  - Orthopedic  Referral; Future    MS (multiple sclerosis) (H)  In remission           Risks and Recommendations:  The patient has the following additional risks and recommendations for perioperative complications:   - No identified additional risk factors other than previously addressed    Antiplatelet or Anticoagulation Medication Instructions:   - aspirin: Discontinue aspirin 7-10 days prior to procedure to reduce bleeding risk. It should be resumed postoperatively.     Additional Medication Instructions:  Patient is to take all scheduled medications on the day of surgery EXCEPT for modifications listed below:   - ACE/ARB: HOLD on day of surgery (minimum 11 hours for general anesthesia).   - rescue Inhaler: Continue PRN. Bring to hospital on the day of surgery.   - theophylline: Hold the evening before surgery.     RECOMMENDATION:  APPROVAL GIVEN to proceed with proposed procedure, without further diagnostic evaluation.        Subjective       HPI related to upcoming procedure: Patient with endocervical polyp, 2.7cm left adnexal cyst.          11/29/2023     1:32 PM   Preop Questions   1. Have you ever had a heart attack or stroke? No   2. Have you ever had surgery on your heart or blood vessels, such as a stent placement, a coronary artery bypass, or surgery on an artery in your head, neck, heart, or legs? No   3. Do you have chest pain with activity? No   4. Do you have a history of  heart failure? No   5. Do you currently have a cold, bronchitis or symptoms of other infection? No   6. Do you have a cough, shortness of breath, or wheezing? YES - Patient has asthma; no recent flares   7. Do you or anyone in your family have previous history of blood clots? No   8. Do you or does anyone in your family have a serious bleeding problem such as prolonged bleeding following surgeries or cuts? No   9. Have you ever had  problems with anemia or been told to take iron pills? No   10. Have you had any abnormal blood loss such as black, tarry or bloody stools, or abnormal vaginal bleeding? No   11. Have you ever had a blood transfusion? No   12. Are you willing to have a blood transfusion if it is medically needed before, during, or after your surgery? Yes   13. Have you or any of your relatives ever had problems with anesthesia? YES - Hard time waking up when they used fentanyl    14. Do you have sleep apnea, excessive snoring or daytime drowsiness? No   15. Do you have any artifical heart valves or other implanted medical devices like a pacemaker, defibrillator, or continuous glucose monitor? No   16. Do you have artificial joints? No   17. Are you allergic to latex? No     Health Care Directive:  Patient does not have a Health Care Directive or Living Will: Discussed advance care planning with patient; however, patient declined at this time.    Preoperative Review of :   reviewed - controlled substances prescribed by other outside provider(s).  Dr Palomino - 9/2023    Status of Chronic Conditions:  See problem list for active medical problems.  Problems all longstanding and stable, except as noted/documented.  See ROS for pertinent symptoms related to these conditions.    ASTHMA - Patient has a longstanding history of moderate-severe Asthma . Patient has been doing well overall noting NO SYMPTOMS and continues on medication regimen consisting of Theophylline and albuterol without adverse reactions or side effects.     HYPERLIPIDEMIA - Patient has a long history of significant Hyperlipidemia and declines medication.   Pooled cohort 8.2%.   3/2023.    HYPERTENSION - Patient has longstanding history of HTN , currently denies any symptoms referable to elevated blood pressure. Specifically denies chest pain, palpitations, dyspnea, orthopnea, PND or peripheral edema. Blood pressure readings have been in normal range. Current  medication regimen is as listed below. Patient denies any side effects of medication.     HYPOTHYROIDISM - Patient has a longstanding history of chronic Hypothyroidism. Patient has been doing well, noting no tremor, insomnia, hair loss or changes in skin texture. Continues to take medications as directed, without adverse reactions or side effects. Last TSH   Lab Results   Component Value Date    TSH 1.28 11/29/2023       OSTEOPENIA - FRAX 7.5%/0.6%.      MS - in remission    COLON CA in 2008 - 3 polyps; 1 was cancerous    EKG done 8/30/23 - at last preop -- SI joint plate with Dr Palomino    RIGHT HIP PAIN - last injection 6/15/23 - parital relief for a month; no upcoming orthopedic    RSV - done at [facility] in 10/2023 with covid booster - need to get records    OXYCODONE with last surgery - side effects - dizziness, cramping.    Review of Systems  Constitutional, neuro, ENT, endocrine, pulmonary, cardiac, gastrointestinal, genitourinary, musculoskeletal, integument and psychiatric systems are negative, except as otherwise noted.    Patient Active Problem List    Diagnosis Date Noted    Osteopenia 03/02/2023     Priority: Medium    Esophagitis 10/13/2022     Priority: Medium    Essential hypertension 10/13/2022     Priority: Medium    Hyperlipidemia 10/13/2022     Priority: Medium    MS (multiple sclerosis) (H) 10/13/2022     Priority: Medium     history of multiple sclerosis, but has been in remission for years, not on current treatment.        Thyroid nodule 10/13/2022     Priority: Medium     Jan 2022 - stable nodules with recommendations to recheck in 1 year.        Class 2 obesity due to excess calories without serious comorbidity with body mass index (BMI) of 36.0 to 36.9 in adult 10/13/2022     Priority: Medium    Lumbar spondylosis 06/08/2022     Priority: Medium     S/p Left Radiofrequency percutaneous neurotomy of the L3, L4 medial branches and the L5 dorsal ramus, to cover the facet joints of L4/L5 and  L5/S1.         History of osteomyelitis 01/06/2022     Priority: Medium     Of jaw, secondary to tooth abscess. S/p IV abx and PICC. Had 3 surgeries and lost 7 teeth. Follow up with ID as needed.      Post concussive syndrome 12/21/2020     Priority: Medium     history of a fall at work on12/26/2019, and sustained a traumatic brain injury and has chronic headaches and postconcussive syndrome. She was taking the garbage out and hit a patch of ice and hit her buttock and her head. Did do occupational therapy and see neurooptics and had prism glasses.        Vasomotor rhinitis 06/29/2018     Priority: Medium    Diverticulosis 07/25/2017     Priority: Medium    Personal history of colonic polyps 07/25/2017     Priority: Medium     2/24/22 - rpt in five years       Dysphonia 10/21/2014     Priority: Medium    Moderate persistent asthma without complication 06/18/2012     Priority: Medium     on theophylline. She was seen by Dr. Moreno for her asthma in the past and this was recommended by him. Her symptoms are tirggered by heat and humidity. She uses ventolin about once per day and feels symptoms.        Tubular adenoma 03/09/2009     Priority: Medium     On colonoscopy 2009.  Repeat colonoscopy in 1 year per GI Recommendations (see note).        Past Medical History:   Diagnosis Date    Asthma     Benign essential hypertension     Colon cancer (H) 2008    Osteopenia     2023 frax 7.5/0.6%    Post concussive syndrome     Thyroid nodule     4; repeat annual ultrsound     History reviewed. No pertinent surgical history.  Current Outpatient Medications   Medication Sig Dispense Refill    albuterol (PROAIR HFA/PROVENTIL HFA/VENTOLIN HFA) 108 (90 Base) MCG/ACT inhaler Inhale 2 puffs into the lungs every 4 hours as needed for shortness of breath or wheezing 18 g 3    aspirin (ASA) 81 MG EC tablet Take 81 mg by mouth      Calcium Citrate-Vitamin D 250-200 MG-UNIT TABS Take 1 tablet by mouth Takes 600mg      cetirizine (ZYRTEC)  10 MG tablet Take by mouth daily as needed      cholecalciferol (VITAMIN D3) 25 mcg (1000 units) capsule       fish oil-omega-3 fatty acids 1000 MG capsule       folic acid (FOLVITE) 400 MCG tablet       Lutein 10 MG TABS Take 20 mg by mouth daily      magnesium 100 MG CAPS Take 1 tablet by mouth daily      methocarbamol (ROBAXIN) 750 MG tablet Take 750 mg by mouth 4 times daily      potassium gluconate 2.5 MEQ TABS Take 1 tablet by mouth every morning      Probiotic Product (PROBIOTIC-10 PO) Take 3 mg by mouth daily      ramipril (ALTACE) 10 MG capsule TAKE 1 CAPSULE BY MOUTH DAILY 90 capsule 2    theophylline (UNIPHYL) 400 MG 24 hr tablet TAKE 1 TABLET BY MOUTH DAILY 90 tablet 2    vitamin A 3 MG (33115 UNITS) capsule Take 2,000 Units by mouth      Zinc 50 MG CAPS       acetaminophen (TYLENOL) 500 MG tablet Take 1,000 mg by mouth (Patient not taking: Reported on 11/29/2023)      Cyanocobalamin 50 MCG TABS Take 1 tablet by mouth daily (Patient not taking: Reported on 6/20/2023)      mometasone (NASONEX) 50 MCG/ACT nasal spray Spray 2 sprays in nostril daily (Patient not taking: Reported on 11/29/2023) 17 g 3       Allergies   Allergen Reactions    Codeine Shortness Of Breath and Swelling    Fentanyl Other (See Comments)     Bradycardia & Hypotension    Meloxicam Other (See Comments)     Hypersensitive, SOB, insomnia, HTN    No Clinical Screening - See Comments Other (See Comments) and Shortness Of Breath     MOLD, GRASSES. Some cleaning products, perfumes, tar,  Skunk scent    Prochlorperazine Hives, Shortness Of Breath and Swelling     COMPAZINE      Shellfish Allergy Hives, Shortness Of Breath and Swelling    Amlodipine Other (See Comments)     Hip pain and edema.    Cyclobenzaprine Other (See Comments)     Jittery, leg cramps, insomnia,     Losartan Cough, Itching and Muscle Pain (Myalgia)    Fluticasone Swelling    Budesonide-Formoterol Fumarate Other (See Comments) and Palpitations     Leg pains        Social  History     Tobacco Use    Smoking status: Former     Packs/day: 2.00     Years: 14.00     Additional pack years: 0.00     Total pack years: 28.00     Types: Cigarettes     Start date: 1970     Quit date: 1977     Years since quittin.9     Passive exposure: Past    Smokeless tobacco: Never   Substance Use Topics    Alcohol use: Not Currently     Family History   Problem Relation Age of Onset    Alzheimer Disease Mother     Alcoholism Mother     Myocardial Infarction Mother     Thrombosis Mother     Cerebrovascular Disease Father     Hearing Loss Father     Myocardial Infarction Father     Valvular heart disease Brother     Cerebrovascular Disease Brother     Cancer Brother     Hypertension Brother     Skin Cancer Brother     Osteopenia Brother     Hypertension Sister     Diabetes Sister     Valvular heart disease Sister     Aortic Valve Replacement Sister     Hypertension Sister     Breast Cancer Sister     Macular Degeneration Sister     Hypertension Sister     Osteopenia Sister      History   Drug Use Unknown         Objective     /85 (BP Location: Right arm, Patient Position: Sitting, Cuff Size: Adult Large)   Pulse 97   Temp 98  F (36.7  C) (Tympanic)   Wt 101.5 kg (223 lb 11.2 oz)   SpO2 95%   BMI 37.81 kg/m      Physical Exam    GENERAL APPEARANCE: healthy, alert and no distress     EYES: EOMI, PERRL     HENT: ear canals and TM's normal and nose and mouth without ulcers or lesions     NECK: no adenopathy, no asymmetry, masses, or scars and thyroid normal to palpation     RESP: lungs clear to auscultation - no rales, rhonchi or wheezes     CV: regular rates and rhythm, normal S1 S2, no S3 or S4 and no murmur, click or rub     ABDOMEN:  soft, nontender, no HSM or masses and bowel sounds normal     MS: extremities normal- no gross deformities noted, no evidence of inflammation in joints, FROM in all extremities.     SKIN: no suspicious lesions or rashes     NEURO: Normal strength and  tone, sensory exam grossly normal, mentation intact and speech normal     PSYCH: mentation appears normal. and affect normal/bright     LYMPHATICS: No cervical adenopathy    Recent Labs   Lab Test 08/30/23  1319 06/06/23  1434 03/02/23  1452   HGB 14.7 15.4 15.9*    258 304     --  138   POTASSIUM 4.2  --  4.2   CR 0.91  --  0.94        Diagnostics:  Recent Results (from the past 24 hour(s))   TSH with free T4 reflex    Collection Time: 11/29/23  2:29 PM   Result Value Ref Range    TSH 1.28 0.30 - 4.20 uIU/mL   Basic metabolic panel    Collection Time: 11/29/23  2:29 PM   Result Value Ref Range    Sodium 138 135 - 145 mmol/L    Potassium 4.0 3.4 - 5.3 mmol/L    Chloride 104 98 - 107 mmol/L    Carbon Dioxide (CO2) 23 22 - 29 mmol/L    Anion Gap 11 7 - 15 mmol/L    Urea Nitrogen 19.8 8.0 - 23.0 mg/dL    Creatinine 1.01 (H) 0.51 - 0.95 mg/dL    GFR Estimate 61 >60 mL/min/1.73m2    Calcium 10.0 8.8 - 10.2 mg/dL    Glucose 99 70 - 99 mg/dL   CBC with platelets and differential    Collection Time: 11/29/23  2:29 PM   Result Value Ref Range    WBC Count 8.8 4.0 - 11.0 10e3/uL    RBC Count 4.67 3.80 - 5.20 10e6/uL    Hemoglobin 13.5 11.7 - 15.7 g/dL    Hematocrit 40.6 35.0 - 47.0 %    MCV 87 78 - 100 fL    MCH 28.9 26.5 - 33.0 pg    MCHC 33.3 31.5 - 36.5 g/dL    RDW 13.5 10.0 - 15.0 %    Platelet Count 348 150 - 450 10e3/uL    % Neutrophils 67 %    % Lymphocytes 21 %    % Monocytes 9 %    % Eosinophils 1 %    % Basophils 1 %    % Immature Granulocytes 1 %    NRBCs per 100 WBC 0 <1 /100    Absolute Neutrophils 5.9 1.6 - 8.3 10e3/uL    Absolute Lymphocytes 1.8 0.8 - 5.3 10e3/uL    Absolute Monocytes 0.8 0.0 - 1.3 10e3/uL    Absolute Eosinophils 0.1 0.0 - 0.7 10e3/uL    Absolute Basophils 0.1 0.0 - 0.2 10e3/uL    Absolute Immature Granulocytes 0.0 <=0.4 10e3/uL    Absolute NRBCs 0.0 10e3/uL      No EKG required, no history of coronary heart disease, significant arrhythmia, peripheral arterial disease or other  structural heart disease.  No EKG this visit, completed in the last 90 days.    Revised Cardiac Risk Index (RCRI):  The patient has the following serious cardiovascular risks for perioperative complications:   - No serious cardiac risks = 0 points     RCRI Interpretation: 0 points: Class I (very low risk - 0.4% complication rate)         Signed Electronically by: Salma Rios MD  Copy of this evaluation report is provided to requesting physician.

## 2023-11-27 NOTE — PROGRESS NOTES
[facility] - [place]  [address]  [place] [address]  Phone: [phone]  Primary Provider: Salma Masterson  Pre-op Performing Provider: SALMA MASTERSON      PREOPERATIVE EVALUATION:  Today's date: 11/29/2023    Rosa is a 63 year old, presenting for the following:  Pre-Op Exam      Surgical Information:  Surgery/Procedure: Hysteroscopy with dilation and curettage of uterus; possible laparoscopic BSO  Surgery Location: [facility]  Surgeon: Dr. Lott  Surgery Date: 12/13/2023  Time of Surgery: TBD  Where patient plans to recover: At home alone  Fax number for surgical facility: Note does not need to be faxed, will be available electronically in Epic.    Assessment & Plan     The proposed surgical procedure is considered INTERMEDIATE risk.    Preop general physical exam  Proceed as planned.  - CBC with platelets and differential; Future  - Basic metabolic panel; Future  - CBC with platelets and differential  - Basic metabolic panel    Cyst of left ovary  As above.  - CBC with platelets and differential; Future  - Basic metabolic panel; Future  - CBC with platelets and differential  - Basic metabolic panel    Endocervical polyp  As above.    Moderate persistent asthma without complication  Stable.    Obesity (BMI 30-39.9)    Hyperlipidemia, unspecified hyperlipidemia type  Declines statin.  The 10-year ASCVD risk score (Lolly DK, et al., 2019) is: 7.2%    Values used to calculate the score:      Age: 63 years      Sex: Female      Is Non- : No      Diabetic: No      Tobacco smoker: No      Systolic Blood Pressure: 121 mmHg      Is BP treated: Yes      HDL Cholesterol: 60 mg/dL      Total Cholesterol: 249 mg/dL      Essential hypertension  Stable.  Continue Altace 10 mg      Thyroid nodule  Surveillance; annual US; TSH updated today  - TSH with free T4 reflex; Future  - TSH with free T4 reflex    Osteopenia, unspecified location  DEXA up to date    Arthritis of right  hip  Injection not beneficial.  Referral to ortho for consideration of joint replacement  - Orthopedic  Referral; Future    MS (multiple sclerosis) (H)  In remission           Risks and Recommendations:  The patient has the following additional risks and recommendations for perioperative complications:   - No identified additional risk factors other than previously addressed    Antiplatelet or Anticoagulation Medication Instructions:   - aspirin: Discontinue aspirin 7-10 days prior to procedure to reduce bleeding risk. It should be resumed postoperatively.     Additional Medication Instructions:  Patient is to take all scheduled medications on the day of surgery EXCEPT for modifications listed below:   - ACE/ARB: HOLD on day of surgery (minimum 11 hours for general anesthesia).   - rescue Inhaler: Continue PRN. Bring to hospital on the day of surgery.   - theophylline: Hold the evening before surgery.     RECOMMENDATION:  APPROVAL GIVEN to proceed with proposed procedure, without further diagnostic evaluation.        Subjective       HPI related to upcoming procedure: Patient with endocervical polyp, 2.7cm left adnexal cyst.          11/29/2023     1:32 PM   Preop Questions   1. Have you ever had a heart attack or stroke? No   2. Have you ever had surgery on your heart or blood vessels, such as a stent placement, a coronary artery bypass, or surgery on an artery in your head, neck, heart, or legs? No   3. Do you have chest pain with activity? No   4. Do you have a history of  heart failure? No   5. Do you currently have a cold, bronchitis or symptoms of other infection? No   6. Do you have a cough, shortness of breath, or wheezing? YES - Patient has asthma; no recent flares   7. Do you or anyone in your family have previous history of blood clots? No   8. Do you or does anyone in your family have a serious bleeding problem such as prolonged bleeding following surgeries or cuts? No   9. Have you ever had  problems with anemia or been told to take iron pills? No   10. Have you had any abnormal blood loss such as black, tarry or bloody stools, or abnormal vaginal bleeding? No   11. Have you ever had a blood transfusion? No   12. Are you willing to have a blood transfusion if it is medically needed before, during, or after your surgery? Yes   13. Have you or any of your relatives ever had problems with anesthesia? YES - Hard time waking up when they used fentanyl    14. Do you have sleep apnea, excessive snoring or daytime drowsiness? No   15. Do you have any artifical heart valves or other implanted medical devices like a pacemaker, defibrillator, or continuous glucose monitor? No   16. Do you have artificial joints? No   17. Are you allergic to latex? No     Health Care Directive:  Patient does not have a Health Care Directive or Living Will: Discussed advance care planning with patient; however, patient declined at this time.    Preoperative Review of :   reviewed - controlled substances prescribed by other outside provider(s).  Dr Palomino - 9/2023    Status of Chronic Conditions:  See problem list for active medical problems.  Problems all longstanding and stable, except as noted/documented.  See ROS for pertinent symptoms related to these conditions.    ASTHMA - Patient has a longstanding history of moderate-severe Asthma . Patient has been doing well overall noting NO SYMPTOMS and continues on medication regimen consisting of Theophylline and albuterol without adverse reactions or side effects.     HYPERLIPIDEMIA - Patient has a long history of significant Hyperlipidemia and declines medication.   Pooled cohort 8.2%.   3/2023.    HYPERTENSION - Patient has longstanding history of HTN , currently denies any symptoms referable to elevated blood pressure. Specifically denies chest pain, palpitations, dyspnea, orthopnea, PND or peripheral edema. Blood pressure readings have been in normal range. Current  medication regimen is as listed below. Patient denies any side effects of medication.     HYPOTHYROIDISM - Patient has a longstanding history of chronic Hypothyroidism. Patient has been doing well, noting no tremor, insomnia, hair loss or changes in skin texture. Continues to take medications as directed, without adverse reactions or side effects. Last TSH   Lab Results   Component Value Date    TSH 1.28 11/29/2023       OSTEOPENIA - FRAX 7.5%/0.6%.      MS - in remission    COLON CA in 2008 - 3 polyps; 1 was cancerous    EKG done 8/30/23 - at last preop -- SI joint plate with Dr Palomino    RIGHT HIP PAIN - last injection 6/15/23 - parital relief for a month; no upcoming orthopedic    RSV - done at [facility] in 10/2023 with covid booster - need to get records    OXYCODONE with last surgery - side effects - dizziness, cramping.    Review of Systems  Constitutional, neuro, ENT, endocrine, pulmonary, cardiac, gastrointestinal, genitourinary, musculoskeletal, integument and psychiatric systems are negative, except as otherwise noted.    Patient Active Problem List    Diagnosis Date Noted    Osteopenia 03/02/2023     Priority: Medium    Esophagitis 10/13/2022     Priority: Medium    Essential hypertension 10/13/2022     Priority: Medium    Hyperlipidemia 10/13/2022     Priority: Medium    MS (multiple sclerosis) (H) 10/13/2022     Priority: Medium     history of multiple sclerosis, but has been in remission for years, not on current treatment.        Thyroid nodule 10/13/2022     Priority: Medium     Jan 2022 - stable nodules with recommendations to recheck in 1 year.        Class 2 obesity due to excess calories without serious comorbidity with body mass index (BMI) of 36.0 to 36.9 in adult 10/13/2022     Priority: Medium    Lumbar spondylosis 06/08/2022     Priority: Medium     S/p Left Radiofrequency percutaneous neurotomy of the L3, L4 medial branches and the L5 dorsal ramus, to cover the facet joints of L4/L5 and  L5/S1.         History of osteomyelitis 01/06/2022     Priority: Medium     Of jaw, secondary to tooth abscess. S/p IV abx and PICC. Had 3 surgeries and lost 7 teeth. Follow up with ID as needed.      Post concussive syndrome 12/21/2020     Priority: Medium     history of a fall at work on12/26/2019, and sustained a traumatic brain injury and has chronic headaches and postconcussive syndrome. She was taking the garbage out and hit a patch of ice and hit her buttock and her head. Did do occupational therapy and see neurooptics and had prism glasses.        Vasomotor rhinitis 06/29/2018     Priority: Medium    Diverticulosis 07/25/2017     Priority: Medium    Personal history of colonic polyps 07/25/2017     Priority: Medium     2/24/22 - rpt in five years       Dysphonia 10/21/2014     Priority: Medium    Moderate persistent asthma without complication 06/18/2012     Priority: Medium     on theophylline. She was seen by Dr. Moreno for her asthma in the past and this was recommended by him. Her symptoms are tirggered by heat and humidity. She uses ventolin about once per day and feels symptoms.        Tubular adenoma 03/09/2009     Priority: Medium     On colonoscopy 2009.  Repeat colonoscopy in 1 year per GI Recommendations (see note).        Past Medical History:   Diagnosis Date    Asthma     Benign essential hypertension     Colon cancer (H) 2008    Osteopenia     2023 frax 7.5/0.6%    Post concussive syndrome     Thyroid nodule     4; repeat annual ultrsound     History reviewed. No pertinent surgical history.  Current Outpatient Medications   Medication Sig Dispense Refill    albuterol (PROAIR HFA/PROVENTIL HFA/VENTOLIN HFA) 108 (90 Base) MCG/ACT inhaler Inhale 2 puffs into the lungs every 4 hours as needed for shortness of breath or wheezing 18 g 3    aspirin (ASA) 81 MG EC tablet Take 81 mg by mouth      Calcium Citrate-Vitamin D 250-200 MG-UNIT TABS Take 1 tablet by mouth Takes 600mg      cetirizine (ZYRTEC)  10 MG tablet Take by mouth daily as needed      cholecalciferol (VITAMIN D3) 25 mcg (1000 units) capsule       fish oil-omega-3 fatty acids 1000 MG capsule       folic acid (FOLVITE) 400 MCG tablet       Lutein 10 MG TABS Take 20 mg by mouth daily      magnesium 100 MG CAPS Take 1 tablet by mouth daily      methocarbamol (ROBAXIN) 750 MG tablet Take 750 mg by mouth 4 times daily      potassium gluconate 2.5 MEQ TABS Take 1 tablet by mouth every morning      Probiotic Product (PROBIOTIC-10 PO) Take 3 mg by mouth daily      ramipril (ALTACE) 10 MG capsule TAKE 1 CAPSULE BY MOUTH DAILY 90 capsule 2    theophylline (UNIPHYL) 400 MG 24 hr tablet TAKE 1 TABLET BY MOUTH DAILY 90 tablet 2    vitamin A 3 MG (61415 UNITS) capsule Take 2,000 Units by mouth      Zinc 50 MG CAPS       acetaminophen (TYLENOL) 500 MG tablet Take 1,000 mg by mouth (Patient not taking: Reported on 11/29/2023)      Cyanocobalamin 50 MCG TABS Take 1 tablet by mouth daily (Patient not taking: Reported on 6/20/2023)      mometasone (NASONEX) 50 MCG/ACT nasal spray Spray 2 sprays in nostril daily (Patient not taking: Reported on 11/29/2023) 17 g 3       Allergies   Allergen Reactions    Codeine Shortness Of Breath and Swelling    Fentanyl Other (See Comments)     Bradycardia & Hypotension    Meloxicam Other (See Comments)     Hypersensitive, SOB, insomnia, HTN    No Clinical Screening - See Comments Other (See Comments) and Shortness Of Breath     MOLD, GRASSES. Some cleaning products, perfumes, tar,  Skunk scent    Prochlorperazine Hives, Shortness Of Breath and Swelling     COMPAZINE      Shellfish Allergy Hives, Shortness Of Breath and Swelling    Amlodipine Other (See Comments)     Hip pain and edema.    Cyclobenzaprine Other (See Comments)     Jittery, leg cramps, insomnia,     Losartan Cough, Itching and Muscle Pain (Myalgia)    Fluticasone Swelling    Budesonide-Formoterol Fumarate Other (See Comments) and Palpitations     Leg pains        Social  History     Tobacco Use    Smoking status: Former     Packs/day: 2.00     Years: 14.00     Additional pack years: 0.00     Total pack years: 28.00     Types: Cigarettes     Start date: 1970     Quit date: 1977     Years since quittin.9     Passive exposure: Past    Smokeless tobacco: Never   Substance Use Topics    Alcohol use: Not Currently     Family History   Problem Relation Age of Onset    Alzheimer Disease Mother     Alcoholism Mother     Myocardial Infarction Mother     Thrombosis Mother     Cerebrovascular Disease Father     Hearing Loss Father     Myocardial Infarction Father     Valvular heart disease Brother     Cerebrovascular Disease Brother     Cancer Brother     Hypertension Brother     Skin Cancer Brother     Osteopenia Brother     Hypertension Sister     Diabetes Sister     Valvular heart disease Sister     Aortic Valve Replacement Sister     Hypertension Sister     Breast Cancer Sister     Macular Degeneration Sister     Hypertension Sister     Osteopenia Sister      History   Drug Use Unknown         Objective     /85 (BP Location: Right arm, Patient Position: Sitting, Cuff Size: Adult Large)   Pulse 97   Temp 98  F (36.7  C) (Tympanic)   Wt 101.5 kg (223 lb 11.2 oz)   SpO2 95%   BMI 37.81 kg/m      Physical Exam    GENERAL APPEARANCE: healthy, alert and no distress     EYES: EOMI, PERRL     HENT: ear canals and TM's normal and nose and mouth without ulcers or lesions     NECK: no adenopathy, no asymmetry, masses, or scars and thyroid normal to palpation     RESP: lungs clear to auscultation - no rales, rhonchi or wheezes     CV: regular rates and rhythm, normal S1 S2, no S3 or S4 and no murmur, click or rub     ABDOMEN:  soft, nontender, no HSM or masses and bowel sounds normal     MS: extremities normal- no gross deformities noted, no evidence of inflammation in joints, FROM in all extremities.     SKIN: no suspicious lesions or rashes     NEURO: Normal strength and  tone, sensory exam grossly normal, mentation intact and speech normal     PSYCH: mentation appears normal. and affect normal/bright     LYMPHATICS: No cervical adenopathy    Recent Labs   Lab Test 08/30/23  1319 06/06/23  1434 03/02/23  1452   HGB 14.7 15.4 15.9*    258 304     --  138   POTASSIUM 4.2  --  4.2   CR 0.91  --  0.94        Diagnostics:  Recent Results (from the past 24 hour(s))   TSH with free T4 reflex    Collection Time: 11/29/23  2:29 PM   Result Value Ref Range    TSH 1.28 0.30 - 4.20 uIU/mL   Basic metabolic panel    Collection Time: 11/29/23  2:29 PM   Result Value Ref Range    Sodium 138 135 - 145 mmol/L    Potassium 4.0 3.4 - 5.3 mmol/L    Chloride 104 98 - 107 mmol/L    Carbon Dioxide (CO2) 23 22 - 29 mmol/L    Anion Gap 11 7 - 15 mmol/L    Urea Nitrogen 19.8 8.0 - 23.0 mg/dL    Creatinine 1.01 (H) 0.51 - 0.95 mg/dL    GFR Estimate 61 >60 mL/min/1.73m2    Calcium 10.0 8.8 - 10.2 mg/dL    Glucose 99 70 - 99 mg/dL   CBC with platelets and differential    Collection Time: 11/29/23  2:29 PM   Result Value Ref Range    WBC Count 8.8 4.0 - 11.0 10e3/uL    RBC Count 4.67 3.80 - 5.20 10e6/uL    Hemoglobin 13.5 11.7 - 15.7 g/dL    Hematocrit 40.6 35.0 - 47.0 %    MCV 87 78 - 100 fL    MCH 28.9 26.5 - 33.0 pg    MCHC 33.3 31.5 - 36.5 g/dL    RDW 13.5 10.0 - 15.0 %    Platelet Count 348 150 - 450 10e3/uL    % Neutrophils 67 %    % Lymphocytes 21 %    % Monocytes 9 %    % Eosinophils 1 %    % Basophils 1 %    % Immature Granulocytes 1 %    NRBCs per 100 WBC 0 <1 /100    Absolute Neutrophils 5.9 1.6 - 8.3 10e3/uL    Absolute Lymphocytes 1.8 0.8 - 5.3 10e3/uL    Absolute Monocytes 0.8 0.0 - 1.3 10e3/uL    Absolute Eosinophils 0.1 0.0 - 0.7 10e3/uL    Absolute Basophils 0.1 0.0 - 0.2 10e3/uL    Absolute Immature Granulocytes 0.0 <=0.4 10e3/uL    Absolute NRBCs 0.0 10e3/uL      No EKG required, no history of coronary heart disease, significant arrhythmia, peripheral arterial disease or other  structural heart disease.  No EKG this visit, completed in the last 90 days.    Revised Cardiac Risk Index (RCRI):  The patient has the following serious cardiovascular risks for perioperative complications:   - No serious cardiac risks = 0 points     RCRI Interpretation: 0 points: Class I (very low risk - 0.4% complication rate)         Signed Electronically by: Salma Rios MD  Copy of this evaluation report is provided to requesting physician.

## 2023-11-28 PROBLEM — E66.812 CLASS 2 SEVERE OBESITY DUE TO EXCESS CALORIES WITH SERIOUS COMORBIDITY IN ADULT (H): Status: RESOLVED | Noted: 2023-06-21 | Resolved: 2023-11-28

## 2023-11-28 PROBLEM — J45.909 ASTHMA: Status: RESOLVED | Noted: 2023-03-02 | Resolved: 2023-11-28

## 2023-11-28 PROBLEM — E66.01 CLASS 2 SEVERE OBESITY DUE TO EXCESS CALORIES WITH SERIOUS COMORBIDITY IN ADULT (H): Status: RESOLVED | Noted: 2023-06-21 | Resolved: 2023-11-28

## 2023-11-28 NOTE — PATIENT INSTRUCTIONS
Preparing for Your Surgery  Getting started  A nurse will call you to review your health history and instructions. They will give you an arrival time based on your scheduled surgery time. Please be ready to share:  Your doctor's clinic name and phone number  Your medical, surgical, and anesthesia history  A list of allergies and sensitivities  A list of medicines, including herbal treatments and over-the-counter drugs  Whether the patient has a legal guardian (ask how to send us the papers in advance)  Please tell us if you're pregnant--or if there's any chance you might be pregnant. Some surgeries may injure a fetus (unborn baby), so they require a pregnancy test. Surgeries that are safe for a fetus don't always need a test, and you can choose whether to have one.   If you have a child who's having surgery, please ask for a copy of Preparing for Your Child's Surgery.    Preparing for surgery  Within 10 to 30 days of surgery: Have a pre-op exam (sometimes called an H&P, or History and Physical). This can be done at a clinic or pre-operative center.  If you're having a , you may not need this exam. Talk to your care team.  At your pre-op exam, talk to your care team about all medicines you take. If you need to stop any medicines before surgery, ask when to start taking them again.  We do this for your safety. Many medicines can make you bleed too much during surgery. Some change how well surgery (anesthesia) drugs work.  Call your insurance company to let them know you're having surgery. (If you don't have insurance, call [phone].)  Call your clinic if there's any change in your health. This includes signs of a cold or flu (sore throat, runny nose, cough, rash, fever). It also includes a scrape or scratch near the surgery site.  If you have questions on the day of surgery, call your hospital or surgery center.  Eating and drinking guidelines  For your safety: Unless your surgeon tells you otherwise,  follow the guidelines below.  Eat and drink as usual until 8 hours before you arrive for surgery. After that, no food or milk.  Drink clear liquids until 2 hours before you arrive. These are liquids you can see through, like water, Gatorade, and Propel Water. They also include plain black coffee and tea (no cream or milk), candy, and breath mints. You can spit out gum when you arrive.  If you drink alcohol: Stop drinking it the night before surgery.  If your care team tells you to take medicine on the morning of surgery, it's okay to take it with a sip of water.  Preventing infection  Shower or bathe the night before and morning of your surgery. Follow the instructions your clinic gave you. (If no instructions, use regular soap.)  Don't shave or clip hair near your surgery site. We'll remove the hair if needed.  Don't smoke or vape the morning of surgery. You may chew nicotine gum up to 2 hours before surgery. A nicotine patch is okay.  Note: Some surgeries require you to completely quit smoking and nicotine. Check with your surgeon.  Your care team will make every effort to keep you safe from infection. We will:  Clean our hands often with soap and water (or an alcohol-based hand rub).  Clean the skin at your surgery site with a special soap that kills germs.  Give you a special gown to keep you warm. (Cold raises the risk of infection.)  Wear special hair covers, masks, gowns and gloves during surgery.  Give antibiotic medicine, if prescribed. Not all surgeries need antibiotics.  What to bring on the day of surgery  Photo ID and insurance card  Copy of your health care directive, if you have one  Glasses and hearing aids (bring cases)  You can't wear contacts during surgery  Inhaler and eye drops, if you use them (tell us about these when you arrive)  CPAP machine or breathing device, if you use them  A few personal items, if spending the night  If you have . . .  A pacemaker, ICD (cardiac defibrillator) or other  implant: Bring the ID card.  An implanted stimulator: Bring the remote control.  A legal guardian: Bring a copy of the certified (court-stamped) guardianship papers.  Please remove any jewelry, including body piercings. Leave jewelry and other valuables at home.  If you're going home the day of surgery  You must have a responsible adult drive you home. They should stay with you overnight as well.  If you don't have someone to stay with you, and you aren't safe to go home alone, we may keep you overnight. Insurance often won't pay for this.  After surgery  If it's hard to control your pain or you need more pain medicine, please call your surgeon's office.  Questions?   If you have any questions for your care team, list them here: _________________________________________________________________________________________________________________________________________________________________________ ____________________________________ ____________________________________ ____________________________________  For informational purposes only. Not to replace the advice of your health care provider. Copyright   2003, 2019 [place]. All rights reserved. Clinically reviewed by Rossi Beverly MD. [employer] 966670 - REV 12/22.    How to Take Your Medication Before Surgery  - HOLD (do not take) Aspirin for 7 days  - HOLD (do not take) Ramipril/Altace day of surgery; do not take Theophylline night prior to or day of surgery  - STOP taking all vitamins and herbal supplements 14 days before surgery.      Will notify of lab results.  Referral to orthopedic for hip.

## 2023-11-29 ENCOUNTER — OFFICE VISIT (OUTPATIENT)
Dept: FAMILY MEDICINE | Facility: OTHER | Age: 63
End: 2023-11-29
Attending: FAMILY MEDICINE
Payer: COMMERCIAL

## 2023-11-29 VITALS
HEART RATE: 97 BPM | SYSTOLIC BLOOD PRESSURE: 121 MMHG | TEMPERATURE: 98 F | WEIGHT: 223.7 LBS | BODY MASS INDEX: 37.81 KG/M2 | OXYGEN SATURATION: 95 % | DIASTOLIC BLOOD PRESSURE: 85 MMHG

## 2023-11-29 DIAGNOSIS — E78.5 HYPERLIPIDEMIA, UNSPECIFIED HYPERLIPIDEMIA TYPE: Chronic | ICD-10-CM

## 2023-11-29 DIAGNOSIS — Z01.818 PREOP GENERAL PHYSICAL EXAM: Primary | ICD-10-CM

## 2023-11-29 DIAGNOSIS — I10 ESSENTIAL HYPERTENSION: Chronic | ICD-10-CM

## 2023-11-29 DIAGNOSIS — N83.202 CYST OF LEFT OVARY: ICD-10-CM

## 2023-11-29 DIAGNOSIS — M85.80 OSTEOPENIA, UNSPECIFIED LOCATION: ICD-10-CM

## 2023-11-29 DIAGNOSIS — M16.11 ARTHRITIS OF RIGHT HIP: ICD-10-CM

## 2023-11-29 DIAGNOSIS — J45.40 MODERATE PERSISTENT ASTHMA WITHOUT COMPLICATION: ICD-10-CM

## 2023-11-29 DIAGNOSIS — G35 MS (MULTIPLE SCLEROSIS) (H): ICD-10-CM

## 2023-11-29 DIAGNOSIS — E04.1 THYROID NODULE: ICD-10-CM

## 2023-11-29 DIAGNOSIS — N84.1 ENDOCERVICAL POLYP: ICD-10-CM

## 2023-11-29 DIAGNOSIS — E66.9 OBESITY (BMI 30-39.9): ICD-10-CM

## 2023-11-29 LAB
ANION GAP SERPL CALCULATED.3IONS-SCNC: 11 MMOL/L (ref 7–15)
BASOPHILS # BLD AUTO: 0.1 10E3/UL (ref 0–0.2)
BASOPHILS NFR BLD AUTO: 1 %
BUN SERPL-MCNC: 19.8 MG/DL (ref 8–23)
CALCIUM SERPL-MCNC: 10 MG/DL (ref 8.8–10.2)
CHLORIDE SERPL-SCNC: 104 MMOL/L (ref 98–107)
CREAT SERPL-MCNC: 1.01 MG/DL (ref 0.51–0.95)
DEPRECATED HCO3 PLAS-SCNC: 23 MMOL/L (ref 22–29)
EGFRCR SERPLBLD CKD-EPI 2021: 61 ML/MIN/1.73M2
EOSINOPHIL # BLD AUTO: 0.1 10E3/UL (ref 0–0.7)
EOSINOPHIL NFR BLD AUTO: 1 %
ERYTHROCYTE [DISTWIDTH] IN BLOOD BY AUTOMATED COUNT: 13.5 % (ref 10–15)
GLUCOSE SERPL-MCNC: 99 MG/DL (ref 70–99)
HCT VFR BLD AUTO: 40.6 % (ref 35–47)
HGB BLD-MCNC: 13.5 G/DL (ref 11.7–15.7)
IMM GRANULOCYTES # BLD: 0 10E3/UL
IMM GRANULOCYTES NFR BLD: 1 %
LYMPHOCYTES # BLD AUTO: 1.8 10E3/UL (ref 0.8–5.3)
LYMPHOCYTES NFR BLD AUTO: 21 %
MCH RBC QN AUTO: 28.9 PG (ref 26.5–33)
MCHC RBC AUTO-ENTMCNC: 33.3 G/DL (ref 31.5–36.5)
MCV RBC AUTO: 87 FL (ref 78–100)
MONOCYTES # BLD AUTO: 0.8 10E3/UL (ref 0–1.3)
MONOCYTES NFR BLD AUTO: 9 %
NEUTROPHILS # BLD AUTO: 5.9 10E3/UL (ref 1.6–8.3)
NEUTROPHILS NFR BLD AUTO: 67 %
NRBC # BLD AUTO: 0 10E3/UL
NRBC BLD AUTO-RTO: 0 /100
PLATELET # BLD AUTO: 348 10E3/UL (ref 150–450)
POTASSIUM SERPL-SCNC: 4 MMOL/L (ref 3.4–5.3)
RBC # BLD AUTO: 4.67 10E6/UL (ref 3.8–5.2)
SODIUM SERPL-SCNC: 138 MMOL/L (ref 135–145)
TSH SERPL DL<=0.005 MIU/L-ACNC: 1.28 UIU/ML (ref 0.3–4.2)
WBC # BLD AUTO: 8.8 10E3/UL (ref 4–11)

## 2023-11-29 PROCEDURE — G0463 HOSPITAL OUTPT CLINIC VISIT: HCPCS

## 2023-11-29 PROCEDURE — 85025 COMPLETE CBC W/AUTO DIFF WBC: CPT | Mod: ZL | Performed by: FAMILY MEDICINE

## 2023-11-29 PROCEDURE — 36415 COLL VENOUS BLD VENIPUNCTURE: CPT | Mod: ZL | Performed by: FAMILY MEDICINE

## 2023-11-29 PROCEDURE — 80048 BASIC METABOLIC PNL TOTAL CA: CPT | Mod: ZL | Performed by: FAMILY MEDICINE

## 2023-11-29 PROCEDURE — 84443 ASSAY THYROID STIM HORMONE: CPT | Mod: ZL | Performed by: FAMILY MEDICINE

## 2023-11-29 PROCEDURE — 99214 OFFICE O/P EST MOD 30 MIN: CPT | Performed by: FAMILY MEDICINE

## 2023-11-29 ASSESSMENT — PAIN SCALES - GENERAL: PAINLEVEL: SEVERE PAIN (6)

## 2023-12-07 ENCOUNTER — ANESTHESIA EVENT (OUTPATIENT)
Dept: SURGERY | Facility: HOSPITAL | Age: 63
End: 2023-12-07
Payer: COMMERCIAL

## 2023-12-07 NOTE — ANESTHESIA PREPROCEDURE EVALUATION
Anesthesia Pre-Procedure Evaluation    Patient: Rosa Alcocer   MRN: [id number] : 1960        Procedure : Procedure(s):  HYSTEROSCOPY, WITH DILATION AND CURETTAGE OF UTERUS  LAPAROSCOPIC SALPINGO-OOPHORECTOMY, BILATERAL          Past Medical History:   Diagnosis Date     Asthma      Benign essential hypertension      Colon cancer (H)      Osteopenia      frax 7.5/0.6%     Post concussive syndrome      Thyroid nodule     4; repeat annual ultrsound      History reviewed. No pertinent surgical history.   Allergies   Allergen Reactions     Codeine Shortness Of Breath and Swelling     Fentanyl Other (See Comments)     Bradycardia & Hypotension     Meloxicam Other (See Comments)     Hypersensitive, SOB, insomnia, HTN     No Clinical Screening - See Comments Other (See Comments) and Shortness Of Breath     MOLD, GRASSES. Some cleaning products, perfumes, tar,  Skunk scent     Prochlorperazine Hives, Shortness Of Breath and Swelling     COMPAZINE       Shellfish Allergy Hives, Shortness Of Breath and Swelling     Amlodipine Other (See Comments)     Hip pain and edema.     Cyclobenzaprine Other (See Comments)     Jittery, leg cramps, insomnia,      Losartan Cough, Itching and Muscle Pain (Myalgia)     Fluticasone Swelling     Budesonide-Formoterol Fumarate Other (See Comments) and Palpitations     Leg pains      Social History     Tobacco Use     Smoking status: Former     Packs/day: 2.00     Years: 14.00     Additional pack years: 0.00     Total pack years: 28.00     Types: Cigarettes     Start date: 1970     Quit date: 1977     Years since quittin.9     Passive exposure: Past     Smokeless tobacco: Never   Substance Use Topics     Alcohol use: Not Currently      Wt Readings from Last 1 Encounters:   23 101.5 kg (223 lb 11.2 oz)        Anesthesia Evaluation   Pt has had prior anesthetic. Type: MAC and General.        ROS/MED HX  ENT/Pulmonary:     (+)     VIBHA risk factors,   obese,       "  tobacco use, Past use,  28  Pack-Year Hx,  Moderate Persistent, asthma  Treatment: Inhaler daily and Nebulizer prn,                 Neurologic:     (+)                   Multiple Sclerosis, limitations: in remission and diagnosed at 17.            Cardiovascular:     (+) Dyslipidemia hypertension- -   -  - -                                      METS/Exercise Tolerance: 3 - Able to walk 1-2 blocks without stopping    Hematologic:       Musculoskeletal:   (+)  arthritis,             GI/Hepatic:  - neg GI/hepatic ROS     Renal/Genitourinary:     (+)       Nephrolithiasis ,       Endo:     (+)          thyroid problem, hypothyroidism nodule,    Obesity,       Psychiatric/Substance Use:       Infectious Disease:  - neg infectious disease ROS     Malignancy:   (+) Malignancy, History of GI.GI CA  Remission status post.      Other:  - neg other ROS    (+)  , H/O Chronic Pain,         Physical Exam    Airway        Mallampati: III   TM distance: > 3 FB   Neck ROM: full   Mouth opening: > 3 cm    Respiratory Devices and Support         Dental       (+) Multiple crowns, permanant bridges      Cardiovascular   cardiovascular exam normal          Pulmonary   pulmonary exam normal            OUTSIDE LABS:  CBC:   Lab Results   Component Value Date    WBC 8.8 11/29/2023    WBC 6.4 08/30/2023    HGB 13.5 11/29/2023    HGB 14.7 08/30/2023    HCT 40.6 11/29/2023    HCT 44.3 08/30/2023     11/29/2023     08/30/2023     BMP:   Lab Results   Component Value Date     11/29/2023     08/30/2023    POTASSIUM 4.0 11/29/2023    POTASSIUM 4.2 08/30/2023    CHLORIDE 104 11/29/2023    CHLORIDE 104 08/30/2023    CO2 23 11/29/2023    CO2 23 08/30/2023    BUN 19.8 11/29/2023    BUN 15.6 08/30/2023    CR 1.01 (H) 11/29/2023    CR 0.91 08/30/2023    GLC 99 11/29/2023     (H) 08/30/2023     COAGS: No results found for: \"PTT\", \"INR\", \"FIBR\"  POC: No results found for: \"BGM\", \"HCG\", \"HCGS\"  HEPATIC:   Lab Results "   Component Value Date    ALBUMIN 4.4 03/02/2023    PROTTOTAL 7.2 03/02/2023    ALT 21 03/02/2023    AST 28 03/02/2023    ALKPHOS 121 (H) 03/02/2023    BILITOTAL 0.5 03/02/2023     OTHER:   Lab Results   Component Value Date    DARY 10.0 11/29/2023    TSH 1.28 11/29/2023       Anesthesia Plan    ASA Status:  3    NPO Status:  NPO Appropriate    Anesthesia Type: General.     - Airway: ETT   Induction: Intravenous, Propofol.   Maintenance: Balanced.   Techniques and Equipment:     - Airway: Video-Laryngoscope       Consents    Anesthesia Plan(s) and associated risks, benefits, and realistic alternatives discussed. Questions answered and patient/representative(s) expressed understanding.     - Discussed: Risks, Benefits and Alternatives for BOTH SEDATION and the PROCEDURE were discussed     - Discussed with:  Patient      - Extended Intubation/Ventilatory Support Discussed: No.      - Patient is DNR/DNI Status: No     Use of blood products discussed: No .     Postoperative Care    Pain management: IV analgesics.   PONV prophylaxis: Ondansetron (or other 5HT-3), Dexamethasone or Solumedrol     Comments:    Other Comments: Discussed risks and benefits with patient for general anesthesia including sore throat, nausea, vomiting, aspiration, dental damage, loss of airway, CV complications, stroke, MI, death. Pt wishes to proceed.  11-29-23           JOSUE Soliman CRNA    I have reviewed the pertinent notes and labs in the chart from the past 30 days and (re)examined the patient.  Any updates or changes from those notes are reflected in this note.

## 2023-12-13 ENCOUNTER — ANESTHESIA (OUTPATIENT)
Dept: SURGERY | Facility: HOSPITAL | Age: 63
End: 2023-12-13
Payer: COMMERCIAL

## 2023-12-13 ENCOUNTER — HOSPITAL ENCOUNTER (OUTPATIENT)
Facility: HOSPITAL | Age: 63
Discharge: HOME | End: 2023-12-13
Attending: OBSTETRICS & GYNECOLOGY | Admitting: OBSTETRICS & GYNECOLOGY
Payer: COMMERCIAL

## 2023-12-13 VITALS
DIASTOLIC BLOOD PRESSURE: 76 MMHG | HEART RATE: 80 BPM | WEIGHT: 224 LBS | TEMPERATURE: 97.6 F | SYSTOLIC BLOOD PRESSURE: 139 MMHG | HEIGHT: 65 IN | OXYGEN SATURATION: 96 % | BODY MASS INDEX: 37.32 KG/M2 | RESPIRATION RATE: 16 BRPM

## 2023-12-13 DIAGNOSIS — Z98.890 POST-OPERATIVE STATE: Primary | ICD-10-CM

## 2023-12-13 PROCEDURE — 710N000012 HC RECOVERY PHASE 2, PER MINUTE: Performed by: OBSTETRICS & GYNECOLOGY

## 2023-12-13 PROCEDURE — 58661 LAPAROSCOPY REMOVE ADNEXA: CPT | Performed by: NURSE ANESTHETIST, CERTIFIED REGISTERED

## 2023-12-13 PROCEDURE — 250N000011 HC RX IP 250 OP 636: Performed by: OBSTETRICS & GYNECOLOGY

## 2023-12-13 PROCEDURE — 58661 LAPAROSCOPY REMOVE ADNEXA: CPT | Mod: 50 | Performed by: OBSTETRICS & GYNECOLOGY

## 2023-12-13 PROCEDURE — 272N000001 HC OR GENERAL SUPPLY STERILE: Performed by: OBSTETRICS & GYNECOLOGY

## 2023-12-13 PROCEDURE — 250N000009 HC RX 250: Performed by: OBSTETRICS & GYNECOLOGY

## 2023-12-13 PROCEDURE — 58558 HYSTEROSCOPY BIOPSY: CPT | Performed by: OBSTETRICS & GYNECOLOGY

## 2023-12-13 PROCEDURE — 250N000025 HC SEVOFLURANE, PER MIN: Performed by: OBSTETRICS & GYNECOLOGY

## 2023-12-13 PROCEDURE — 370N000017 HC ANESTHESIA TECHNICAL FEE, PER MIN: Performed by: OBSTETRICS & GYNECOLOGY

## 2023-12-13 PROCEDURE — 88305 TISSUE EXAM BY PATHOLOGIST: CPT | Mod: TC | Performed by: OBSTETRICS & GYNECOLOGY

## 2023-12-13 PROCEDURE — 250N000009 HC RX 250: Performed by: NURSE ANESTHETIST, CERTIFIED REGISTERED

## 2023-12-13 PROCEDURE — 88305 TISSUE EXAM BY PATHOLOGIST: CPT | Mod: 26 | Performed by: PATHOLOGY

## 2023-12-13 PROCEDURE — 999N000141 HC STATISTIC PRE-PROCEDURE NURSING ASSESSMENT: Performed by: OBSTETRICS & GYNECOLOGY

## 2023-12-13 PROCEDURE — 258N000003 HC RX IP 258 OP 636: Performed by: NURSE ANESTHETIST, CERTIFIED REGISTERED

## 2023-12-13 PROCEDURE — 710N000010 HC RECOVERY PHASE 1, LEVEL 2, PER MIN: Performed by: OBSTETRICS & GYNECOLOGY

## 2023-12-13 PROCEDURE — 250N000011 HC RX IP 250 OP 636: Mod: JZ | Performed by: NURSE ANESTHETIST, CERTIFIED REGISTERED

## 2023-12-13 PROCEDURE — 250N000013 HC RX MED GY IP 250 OP 250 PS 637: Performed by: OBSTETRICS & GYNECOLOGY

## 2023-12-13 PROCEDURE — 360N000076 HC SURGERY LEVEL 3, PER MIN: Performed by: OBSTETRICS & GYNECOLOGY

## 2023-12-13 PROCEDURE — 49321 LAPAROSCOPY BIOPSY: CPT | Mod: XU | Performed by: OBSTETRICS & GYNECOLOGY

## 2023-12-13 RX ORDER — PROPOFOL 10 MG/ML
INJECTION, EMULSION INTRAVENOUS PRN
Status: DISCONTINUED | OUTPATIENT
Start: 2023-12-13 | End: 2023-12-13

## 2023-12-13 RX ORDER — KETAMINE HYDROCHLORIDE 10 MG/ML
INJECTION INTRAMUSCULAR; INTRAVENOUS PRN
Status: DISCONTINUED | OUTPATIENT
Start: 2023-12-13 | End: 2023-12-13

## 2023-12-13 RX ORDER — IBUPROFEN 600 MG/1
600 TABLET, FILM COATED ORAL EVERY 8 HOURS PRN
Qty: 30 TABLET | Refills: 0 | Status: ON HOLD | OUTPATIENT
Start: 2023-12-13 | End: 2024-04-30

## 2023-12-13 RX ORDER — HYDROXYZINE HYDROCHLORIDE 10 MG/1
10 TABLET, FILM COATED ORAL
Status: DISCONTINUED | OUTPATIENT
Start: 2023-12-13 | End: 2023-12-13 | Stop reason: HOSPADM

## 2023-12-13 RX ORDER — SODIUM CHLORIDE, SODIUM LACTATE, POTASSIUM CHLORIDE, CALCIUM CHLORIDE 600; 310; 30; 20 MG/100ML; MG/100ML; MG/100ML; MG/100ML
INJECTION, SOLUTION INTRAVENOUS CONTINUOUS
Status: DISCONTINUED | OUTPATIENT
Start: 2023-12-13 | End: 2023-12-13 | Stop reason: HOSPADM

## 2023-12-13 RX ORDER — HYDROMORPHONE HYDROCHLORIDE 1 MG/ML
0.4 INJECTION, SOLUTION INTRAMUSCULAR; INTRAVENOUS; SUBCUTANEOUS EVERY 5 MIN PRN
Status: DISCONTINUED | OUTPATIENT
Start: 2023-12-13 | End: 2023-12-13 | Stop reason: HOSPADM

## 2023-12-13 RX ORDER — ONDANSETRON 4 MG/1
4 TABLET, ORALLY DISINTEGRATING ORAL EVERY 30 MIN PRN
Status: DISCONTINUED | OUTPATIENT
Start: 2023-12-13 | End: 2023-12-13 | Stop reason: HOSPADM

## 2023-12-13 RX ORDER — HYDROMORPHONE HYDROCHLORIDE 1 MG/ML
0.2 INJECTION, SOLUTION INTRAMUSCULAR; INTRAVENOUS; SUBCUTANEOUS EVERY 5 MIN PRN
Status: DISCONTINUED | OUTPATIENT
Start: 2023-12-13 | End: 2023-12-13 | Stop reason: HOSPADM

## 2023-12-13 RX ORDER — FENTANYL CITRATE 50 UG/ML
25 INJECTION, SOLUTION INTRAMUSCULAR; INTRAVENOUS EVERY 5 MIN PRN
Status: DISCONTINUED | OUTPATIENT
Start: 2023-12-13 | End: 2023-12-13 | Stop reason: HOSPADM

## 2023-12-13 RX ORDER — DEXAMETHASONE SODIUM PHOSPHATE 4 MG/ML
INJECTION, SOLUTION INTRA-ARTICULAR; INTRALESIONAL; INTRAMUSCULAR; INTRAVENOUS; SOFT TISSUE PRN
Status: DISCONTINUED | OUTPATIENT
Start: 2023-12-13 | End: 2023-12-13

## 2023-12-13 RX ORDER — NALOXONE HYDROCHLORIDE 0.4 MG/ML
0.4 INJECTION, SOLUTION INTRAMUSCULAR; INTRAVENOUS; SUBCUTANEOUS
Status: DISCONTINUED | OUTPATIENT
Start: 2023-12-13 | End: 2023-12-13 | Stop reason: HOSPADM

## 2023-12-13 RX ORDER — CEFAZOLIN SODIUM/WATER 2 G/20 ML
2 SYRINGE (ML) INTRAVENOUS
Status: COMPLETED | OUTPATIENT
Start: 2023-12-13 | End: 2023-12-13

## 2023-12-13 RX ORDER — BUPIVACAINE HYDROCHLORIDE 2.5 MG/ML
INJECTION, SOLUTION EPIDURAL; INFILTRATION; INTRACAUDAL
Status: DISCONTINUED
Start: 2023-12-13 | End: 2023-12-13 | Stop reason: HOSPADM

## 2023-12-13 RX ORDER — LIDOCAINE HYDROCHLORIDE 20 MG/ML
INJECTION, SOLUTION INFILTRATION; PERINEURAL PRN
Status: DISCONTINUED | OUTPATIENT
Start: 2023-12-13 | End: 2023-12-13

## 2023-12-13 RX ORDER — CEFAZOLIN SODIUM/WATER 2 G/20 ML
2 SYRINGE (ML) INTRAVENOUS SEE ADMIN INSTRUCTIONS
Status: DISCONTINUED | OUTPATIENT
Start: 2023-12-13 | End: 2023-12-13 | Stop reason: HOSPADM

## 2023-12-13 RX ORDER — NALOXONE HYDROCHLORIDE 0.4 MG/ML
0.2 INJECTION, SOLUTION INTRAMUSCULAR; INTRAVENOUS; SUBCUTANEOUS
Status: DISCONTINUED | OUTPATIENT
Start: 2023-12-13 | End: 2023-12-13 | Stop reason: HOSPADM

## 2023-12-13 RX ORDER — BUPIVACAINE HYDROCHLORIDE 5 MG/ML
INJECTION, SOLUTION EPIDURAL; INTRACAUDAL
Status: DISCONTINUED
Start: 2023-12-13 | End: 2023-12-13 | Stop reason: HOSPADM

## 2023-12-13 RX ORDER — METHOCARBAMOL 750 MG/1
750 TABLET, FILM COATED ORAL 4 TIMES DAILY PRN
Qty: 30 TABLET | Refills: 0 | Status: SHIPPED | OUTPATIENT
Start: 2023-12-13 | End: 2024-04-22

## 2023-12-13 RX ORDER — ONDANSETRON 2 MG/ML
4 INJECTION INTRAMUSCULAR; INTRAVENOUS EVERY 30 MIN PRN
Status: DISCONTINUED | OUTPATIENT
Start: 2023-12-13 | End: 2023-12-13 | Stop reason: HOSPADM

## 2023-12-13 RX ORDER — LIDOCAINE HYDROCHLORIDE 10 MG/ML
INJECTION, SOLUTION EPIDURAL; INFILTRATION; INTRACAUDAL; PERINEURAL
Status: DISCONTINUED
Start: 2023-12-13 | End: 2023-12-13 | Stop reason: HOSPADM

## 2023-12-13 RX ORDER — MISOPROSTOL 200 UG/1
200 TABLET ORAL ONCE
Status: COMPLETED | OUTPATIENT
Start: 2023-12-13 | End: 2023-12-13

## 2023-12-13 RX ORDER — IBUPROFEN 600 MG/1
600 TABLET, FILM COATED ORAL EVERY 8 HOURS PRN
Qty: 30 TABLET | Refills: 0 | Status: CANCELLED | OUTPATIENT
Start: 2023-12-13

## 2023-12-13 RX ORDER — ONDANSETRON 4 MG/1
4 TABLET, ORALLY DISINTEGRATING ORAL
Status: DISCONTINUED | OUTPATIENT
Start: 2023-12-13 | End: 2023-12-13 | Stop reason: HOSPADM

## 2023-12-13 RX ORDER — ACETAMINOPHEN 325 MG/1
975 TABLET ORAL ONCE
Status: COMPLETED | OUTPATIENT
Start: 2023-12-13 | End: 2023-12-13

## 2023-12-13 RX ORDER — LIDOCAINE 40 MG/G
CREAM TOPICAL
Status: DISCONTINUED | OUTPATIENT
Start: 2023-12-13 | End: 2023-12-13 | Stop reason: HOSPADM

## 2023-12-13 RX ORDER — METHOCARBAMOL 750 MG/1
750 TABLET, FILM COATED ORAL
Status: COMPLETED | OUTPATIENT
Start: 2023-12-13 | End: 2023-12-13

## 2023-12-13 RX ORDER — FENTANYL CITRATE 50 UG/ML
50 INJECTION, SOLUTION INTRAMUSCULAR; INTRAVENOUS EVERY 5 MIN PRN
Status: DISCONTINUED | OUTPATIENT
Start: 2023-12-13 | End: 2023-12-13 | Stop reason: HOSPADM

## 2023-12-13 RX ORDER — LIDOCAINE HYDROCHLORIDE 10 MG/ML
INJECTION, SOLUTION INFILTRATION; PERINEURAL PRN
Status: DISCONTINUED | OUTPATIENT
Start: 2023-12-13 | End: 2023-12-13 | Stop reason: HOSPADM

## 2023-12-13 RX ORDER — ONDANSETRON 2 MG/ML
INJECTION INTRAMUSCULAR; INTRAVENOUS PRN
Status: DISCONTINUED | OUTPATIENT
Start: 2023-12-13 | End: 2023-12-13

## 2023-12-13 RX ADMIN — Medication 2 G: at 07:40

## 2023-12-13 RX ADMIN — Medication 20 MG: at 08:12

## 2023-12-13 RX ADMIN — ROCURONIUM BROMIDE 10 MG: 10 INJECTION INTRAVENOUS at 08:58

## 2023-12-13 RX ADMIN — ACETAMINOPHEN 975 MG: 325 TABLET, FILM COATED ORAL at 07:31

## 2023-12-13 RX ADMIN — LIDOCAINE HYDROCHLORIDE 60 MG: 20 INJECTION, SOLUTION INFILTRATION; PERINEURAL at 08:15

## 2023-12-13 RX ADMIN — PROPOFOL 200 MG: 10 INJECTION, EMULSION INTRAVENOUS at 08:11

## 2023-12-13 RX ADMIN — MISOPROSTOL 200 MCG: 200 TABLET ORAL at 07:31

## 2023-12-13 RX ADMIN — ONDANSETRON 4 MG: 2 INJECTION INTRAMUSCULAR; INTRAVENOUS at 08:33

## 2023-12-13 RX ADMIN — SODIUM CHLORIDE, POTASSIUM CHLORIDE, SODIUM LACTATE AND CALCIUM CHLORIDE: 600; 310; 30; 20 INJECTION, SOLUTION INTRAVENOUS at 09:19

## 2023-12-13 RX ADMIN — DEXAMETHASONE SODIUM PHOSPHATE 10 MG: 4 INJECTION, SOLUTION INTRA-ARTICULAR; INTRALESIONAL; INTRAMUSCULAR; INTRAVENOUS; SOFT TISSUE at 08:30

## 2023-12-13 RX ADMIN — Medication 100 MG: at 08:11

## 2023-12-13 RX ADMIN — ROCURONIUM BROMIDE 30 MG: 10 INJECTION INTRAVENOUS at 08:13

## 2023-12-13 RX ADMIN — SUGAMMADEX 200 MG: 100 INJECTION, SOLUTION INTRAVENOUS at 09:03

## 2023-12-13 RX ADMIN — METHOCARBAMOL 750 MG: 750 TABLET ORAL at 11:37

## 2023-12-13 ASSESSMENT — ACTIVITIES OF DAILY LIVING (ADL)
ADLS_ACUITY_SCORE: 33
ADLS_ACUITY_SCORE: 35
ADLS_ACUITY_SCORE: 35

## 2023-12-13 ASSESSMENT — LIFESTYLE VARIABLES: TOBACCO_USE: 1

## 2023-12-13 NOTE — DISCHARGE INSTRUCTIONS
No driving today or while on pain medications  May shower starting day after surgery.  No swim, bath soak for 2 weeks.  Pelvic rest for 2 weeks  Call Dr. Lott [phone] as necessary if problems, no post op appt needed.  No heavy lifting, vigorous activity,  exercise for 1 week      Post-Anesthesia Patient Instructions    IMMEDIATELY FOLLOWING SURGERY:  Do not drive or operate machinery for the first twenty four hours after surgery.  Do not make any important decisions for twenty four hours after surgery or while taking narcotic pain medications or sedatives.  If you develop intractable nausea and vomiting or a severe headache please notify your doctor immediately.    FOLLOW-UP:  Please make an appointment with your surgeon as instructed. You do not need to follow up with anesthesia unless specifically instructed to do so.    WOUND CARE INSTRUCTIONS (if applicable):  Keep a dry clean dressing on the anesthesia/puncture wound site if there is drainage.  Once the wound has quit draining you may leave it open to air.  Generally you should leave the bandage intact for twenty four hours unless there is drainage.  If the epidural site drains for more than 36-48 hours please call the anesthesia department.    QUESTIONS?:  Please feel free to call your physician or the hospital  if you have any questions, and they will be happy to assist you.

## 2023-12-13 NOTE — ANESTHESIA CARE TRANSFER NOTE
Patient: Rosa Alcocer    Procedure: Procedure(s):  HYSTEROSCOPY, WITH DILATION AND CURETTAGE OF UTERUS  LAPAROSCOPIC SALPINGO-OOPHORECTOMY, BILATERAL and peritoneal biopsies       Diagnosis: Endocervical polyp [N84.1]  Ovarian cyst [N83.209]  Diagnosis Additional Information: No value filed.    Anesthesia Type:   General     Note:    Oropharynx: spontaneously breathing  Level of Consciousness: drowsy  Oxygen Supplementation: face mask  Level of Supplemental Oxygen (L/min / FiO2): 6  Independent Airway: airway patency satisfactory and stable    Vital Signs Stable: post-procedure vital signs reviewed and stable  Report to RN Given: handoff report given  Patient transferred to: PACU    Handoff Report: Identifed the Patient, Identified the Reponsible Provider, Reviewed the pertinent medical history, Discussed the surgical course, Reviewed Intra-OP anesthesia mangement and issues during anesthesia, Set expectations for post-procedure period and Allowed opportunity for questions and acknowledgement of understanding      Vitals:  Vitals Value Taken Time   /90 12/13/23 0932   Temp     Pulse 57 12/13/23 0935   Resp 19 12/13/23 0935   SpO2 98 % 12/13/23 0935   Vitals shown include unfiled device data.    Electronically Signed By: JOSUE Cali CRNA  December 13, 2023  9:37 AM

## 2023-12-13 NOTE — OR NURSING
Patient and responsible adult Rossi(sister) given discharge instructions with no questions regarding instructions. Delilah score 19/20. Pain level 3/10.  Discharged from unit via wheelchair. Patient discharged to home via [employer].

## 2023-12-13 NOTE — OR NURSING
Face to face report given with opportunity to observe patient.    Report given to LEATHA Ballard RN   12/13/2023  11:53 AM

## 2023-12-13 NOTE — OR NURSING
PACU Respiratory Event Documentation     1) Episodes of Apnea greater than or equal to 10 seconds: 0    2) Bradypnea - less than 8 breaths per minute: 0    3) Pain score on 0 to 10 scale: 5    4) Pain-sedation mismatch (yes or no): no    5) Repeated 02 desaturation less than 90% (yes or no): no    Anesthesia notified? (yes or no): no    Any of the above events occuring repeatedly in separate 30 minute intervals may be considered recurrent PACU respiratory events.

## 2023-12-13 NOTE — OR NURSING
Spoke with [facility] and they will deliver Rxs.  Spoke with Jenna Byers who states they will be here t 1240. Gave discharge instructions to pt sister Rossi via phone.  Rossi will meet pt at her home at 13.. and is staying two days with her.

## 2023-12-13 NOTE — INTERVAL H&P NOTE
I have reviewed the surgical (or preoperative) H&P that is linked to this encounter, and examined the patient. There are no significant changes.  Reviewed goals, risks, alternatives for planned procedure;  Including risk of bleeding, transfusion, infection, damage to nerves, blood vessels, bowel and bladder, blood clots, pneumonia, and other rare or unforseen complications.   Discussed recovery period and expected discomfort.. All questions were answered. Consent form reviewed and signed.   Patient desires to proceed.

## 2023-12-13 NOTE — INTERVAL H&P NOTE
"I have reviewed the surgical (or preoperative) H&P that is linked to this encounter, and examined the patient. There are no significant changes    Clinical Conditions Present on Arrival:  Clinically Significant Risk Factors Present on Admission                 # Drug Induced Platelet Defect: home medication list includes an antiplatelet medication  # Obesity: Estimated body mass index is 37.86 kg/m  as calculated from the following:    Height as of this encounter: 1.638 m (5' 4.5\").    Weight as of this encounter: 101.6 kg (224 lb).       "

## 2023-12-13 NOTE — OP NOTE
[facility]    PREOPERATIVE DIAGNOSIS:  Endocervical polyp [N84.1]  Ovarian cyst [N83.209]      POSTOPERATIVE DIAGNOSIS:  Same.  Pathology pending     PROCEDURE: Hysteroscopy, Dilatation and Curettage, laparoscopic BSO and peritoneal biopsies.       ANESTHESIA:  General.     ASSISTANT:  None     COMPLICATIONS:  None.     EBL:  20 ml    Specimens:  Endometrial/endocervical curettings. Peritoneal biopsies, uterine biopsy, Right and left ovaries/fallopian tubes.      OPERATIVE FINDINGS:    On hysteroscopy there was a normal appearing/contoured cavity with benign appearing endometrium and no evidence submucosal fibroids or polyps.  There was a 2 cm endocervical polyp present.   On laparoscopy, there is normal upper abdominal anatomy.  In the pelvis, There were powder burn peritoneal lesions of anterior and posterior cul-de-sac and uterine serosal lesion c/w fibroid.  The left ovary was enlarged, multicystic, the right adnexa was normal in appearance.  .  Otherwise, normal pelvic anatomy.      OPERATIVE DICTATION:  The patient was brought to the operating room and uneventfully placed under general anesthesia.  She was prepped and draped in the dorsal lithotomy position and her bladder drained.  The cervix was visualized with a weighted speculum and grasped anteriorly with a fine-toothed tenaculum.  The uterus was sounded and the cervix gently dilated with Petty dilators. . Hysteroscopy was performed using a 30-degree rigid hysteroscope with normal saline as distention media. Visualization was excellent. Findings were as described above. The hysteroscope was removed.  A stone polyp forceps was used to grasp and remove the endocervical polyp and the endocervix and ECC performed. Gentle but thorough sharp curettage was performed in all 4 uterine  quadrants.  A uterine manipulating device placed.  The tenaculum and weighted speculum were removed.  We then changed gloves and proceeded to the abdominal portion of  the case.  A 5 mm infraumbilical skin incision was performed with a scalpel.  A Veress needle was introduced without difficulty and a water drop test was performed.  The abdomen was insufflated with several liters of carbon dioxide.  A 5 mm trocar and the laparoscope were introduced.  Visualization was excellent.  An 11 mm suprapubic and 5mm LLQ port were then placed under direct visualization .Exploratory laparoscopy was performed with findings as above. Peritoneal and uterine biopsies were obtained with biopsy forceps.The LigaSure bipolar cutting cautery device was used to transect the Left  IP ligament away from pelvic sidewall structures.  The dissection was continued through the round and uteroovarian ligaments.  The same procedure was repeated on the right.  The adnexa were transected and removed through suprapubic port separately in endobags.  The pelvis was irrigated and checked for hemostasis.The trocars were removed and excess carbon dioxide expressed from the abdomen. Subcutaneous spaces were irrigated and checked for hemostasis. The suprapubic abdominal fascia was closed with running 2.0 vicryl.  The skin incisions were closed with 3.0 subcuticular Monocryl and surgical glue. There were no complications. The uterine manipulating divise was removed.   The patient was transferred to the recovery room in excellent stable condition.         SHEREE RUVALCABA MD

## 2023-12-13 NOTE — ANESTHESIA PROCEDURE NOTES
Airway       Patient location during procedure: OR       Procedure Start/Stop Times: 12/13/2023 8:24 AM and 12/13/2023 8:35 AM  Staff -        CRNA: Jesika Rob APRN CRNA       Performed By: CRNAIndications and Patient Condition       Induction type:intravenous       Mask difficulty assessment: 1 - vent by mask    Final Airway Details       Final airway type: endotracheal airway       Successful airway: ETT - single and Oral  Endotracheal Airway Details        ETT size (mm): 7.5Cuffed: no       Successful intubation technique: video laryngoscopy       VL Blade Size: Glidescope 3       Grade View of Cords: 1       Adjucts: stylet       Position: Center       Measured from: lips       Secured at (cm): 23       Bite block used: None    Post intubation assessment        Placement verified by: capnometry, equal breath sounds and chest rise        Number of attempts at approach: 1       Number of other approaches attempted: 0       Secured with: tape       Ease of procedure: easy       Dentition: Intact    Medication(s) Administered   Medication Administration Time: 12/13/2023 8:24 AM

## 2023-12-13 NOTE — ANESTHESIA POSTPROCEDURE EVALUATION
Patient: Rosa Alcocer    Procedure: Procedure(s):  HYSTEROSCOPY, WITH DILATION AND CURETTAGE OF UTERUS  LAPAROSCOPIC SALPINGO-OOPHORECTOMY, BILATERAL and peritoneal biopsies       Anesthesia Type:  General    Note:  Disposition: Outpatient   Postop Pain Control: Uneventful            Sign Out: Well controlled pain   PONV: No   Neuro/Psych: Uneventful            Sign Out: Acceptable/Baseline neuro status   Airway/Respiratory: Uneventful            Sign Out: Acceptable/Baseline resp. status   CV/Hemodynamics: Uneventful            Sign Out: Acceptable CV status; No obvious hypovolemia; No obvious fluid overload   Other NRE: NONE   DID A NON-ROUTINE EVENT OCCUR? No       Last vitals:  Vitals Value Taken Time   /86 12/13/23 1015   Temp 97.5  F (36.4  C) 12/13/23 1015   Pulse 50 12/13/23 1020   Resp 18 12/13/23 1020   SpO2 98 % 12/13/23 1020       Electronically Signed By: JOSUE Manning CRNA  December 13, 2023  10:41 AM

## 2023-12-13 NOTE — OR NURSING
Patient ambulated to the bathroom to void, did have a brief spell of dizziness upon getting to the bathroom.   Returned to room via wheelchair, symptoms resolved.   Dr. Lott in to see patient.  Patient reports pain now 7/10.  Dr. Lott discussed pain medications/options with patient.  Plan for robaxin at home along with tylenol and ibuprofen.   Will give dose of robaxin here prior to discharge. Patient given warm blanket to abd and pillow under knees for support.

## 2023-12-15 LAB
PATH REPORT.COMMENTS IMP SPEC: NORMAL
PATH REPORT.FINAL DX SPEC: NORMAL
PATH REPORT.GROSS SPEC: NORMAL
PATH REPORT.MICROSCOPIC SPEC OTHER STN: NORMAL
PATH REPORT.RELEVANT HX SPEC: NORMAL
PHOTO IMAGE: NORMAL

## 2023-12-18 DIAGNOSIS — R30.0 BURNING WITH URINATION: Primary | ICD-10-CM

## 2023-12-18 NOTE — PROGRESS NOTES
Called patient to give test results. Patient is reporting that she is having a lot of burning when she urinates. Scheduled patient for a lab only appointment and a UA.

## 2023-12-19 ENCOUNTER — TELEPHONE (OUTPATIENT)
Dept: FAMILY MEDICINE | Facility: OTHER | Age: 63
End: 2023-12-19

## 2023-12-19 ENCOUNTER — LAB (OUTPATIENT)
Dept: LAB | Facility: OTHER | Age: 63
End: 2023-12-19
Payer: COMMERCIAL

## 2023-12-19 DIAGNOSIS — R30.0 BURNING WITH URINATION: ICD-10-CM

## 2023-12-19 LAB
ALBUMIN UR-MCNC: 20 MG/DL
APPEARANCE UR: CLEAR
BILIRUB UR QL STRIP: NEGATIVE
COLOR UR AUTO: YELLOW
GLUCOSE UR STRIP-MCNC: NEGATIVE MG/DL
HGB UR QL STRIP: ABNORMAL
KETONES UR STRIP-MCNC: ABNORMAL MG/DL
LEUKOCYTE ESTERASE UR QL STRIP: ABNORMAL
MUCOUS THREADS #/AREA URNS LPF: PRESENT /LPF
NITRATE UR QL: NEGATIVE
PH UR STRIP: 5.5 [PH] (ref 4.7–8)
RBC URINE: 27 /HPF
SP GR UR STRIP: 1.01 (ref 1–1.03)
SQUAMOUS EPITHELIAL: 0 /HPF
UROBILINOGEN UR STRIP-MCNC: NORMAL MG/DL
WBC URINE: 5 /HPF

## 2023-12-19 PROCEDURE — 81003 URINALYSIS AUTO W/O SCOPE: CPT | Mod: ZL

## 2023-12-19 NOTE — TELEPHONE ENCOUNTER
Patient is in town and is wondering the results of her urine lab.  She want's to  medication from the pharmacy before she goes home.  Please result and call her back

## 2023-12-19 NOTE — TELEPHONE ENCOUNTER
December 19, 2023    Patient called requesting results of UA. Please call back when able at [phone] -[facility]

## 2023-12-21 ENCOUNTER — TELEPHONE (OUTPATIENT)
Dept: OBGYN | Facility: OTHER | Age: 63
End: 2023-12-21

## 2023-12-21 ENCOUNTER — OFFICE VISIT (OUTPATIENT)
Dept: OBGYN | Facility: OTHER | Age: 63
End: 2023-12-21
Attending: NURSE PRACTITIONER
Payer: COMMERCIAL

## 2023-12-21 VITALS
WEIGHT: 223 LBS | BODY MASS INDEX: 37.15 KG/M2 | DIASTOLIC BLOOD PRESSURE: 70 MMHG | HEART RATE: 95 BPM | OXYGEN SATURATION: 98 % | TEMPERATURE: 98.3 F | SYSTOLIC BLOOD PRESSURE: 140 MMHG | HEIGHT: 65 IN

## 2023-12-21 DIAGNOSIS — Z09 POSTOP CHECK: ICD-10-CM

## 2023-12-21 DIAGNOSIS — N89.8 VAGINAL DISCHARGE: Primary | ICD-10-CM

## 2023-12-21 LAB
BACTERIAL VAGINOSIS VAG-IMP: NEGATIVE
CANDIDA DNA VAG QL NAA+PROBE: NOT DETECTED
CANDIDA GLABRATA / CANDIDA KRUSEI DNA: NOT DETECTED
T VAGINALIS DNA VAG QL NAA+PROBE: NOT DETECTED

## 2023-12-21 PROCEDURE — 99024 POSTOP FOLLOW-UP VISIT: CPT | Performed by: NURSE PRACTITIONER

## 2023-12-21 PROCEDURE — 0352U MULTIPLEX VAGINAL PANEL BY PCR: CPT | Mod: ZL | Performed by: NURSE PRACTITIONER

## 2023-12-21 PROCEDURE — G0463 HOSPITAL OUTPT CLINIC VISIT: HCPCS | Mod: 25

## 2023-12-21 ASSESSMENT — PAIN SCALES - GENERAL: PAINLEVEL: MODERATE PAIN (4)

## 2023-12-21 NOTE — TELEPHONE ENCOUNTER
December 21, 2023    Patient had surgery with Dr Lott 12/13/23. Patient states she has an odor, and did take a lab for a UA which was negative. Please call back    -Yesika Razo  [facility]

## 2023-12-21 NOTE — PROGRESS NOTES
S.Rosa Alcocer 63 year old female presents for post operative check. She is  1  week(s) status post hysteroscopy, D&C, BSO, and peritoneal biopsy.   She reports doing well and denies significant pain or bleeding.  She has concerns of foul odor to her vaginal discharge.  Brown in color.  Denies itching buring, or vaginal pain.  Following post op restrictions.  There were no pathological findings.    O.  B/P: 140/70, T: 98.3, P: 95, R: Data Unavailable    Abd: soft, non-tender, non-distended. Incisions clear, dry, and intact without evidence of infection.  Gyn: normal external female.  Vagina pink.  Old blood discharge without odor.  Multi plex done    A.    Vaginal discharge  Postop check       Doing well    P. Follow up prn or when due for next annual exam.

## 2023-12-21 NOTE — TELEPHONE ENCOUNTER
Called patient she said she is having a vaginal odor, there was a smear of brown discharge when she went to the bathroom. She also is having burning.  She had surgery with Dr. Lott on 12-   HYSTEROSCOPY, WITH DILATION AND CURETTAGE OF UTERUS   LAPAROSCOPIC SALPINGO-OOPHORECTOMY, BILATERAL and peritoneal biopsies    She did come in for a UA earlier in the week and which was negative. She would like to come in to be checked out.

## 2024-01-30 ENCOUNTER — TRANSFERRED RECORDS (OUTPATIENT)
Dept: HEALTH INFORMATION MANAGEMENT | Facility: CLINIC | Age: 64
End: 2024-01-30

## 2024-02-12 ENCOUNTER — PREP FOR PROCEDURE (OUTPATIENT)
Dept: SURGERY | Facility: CLINIC | Age: 64
End: 2024-02-12

## 2024-02-12 DIAGNOSIS — M16.11 ARTHRITIS OF RIGHT HIP: Primary | ICD-10-CM

## 2024-03-14 ENCOUNTER — TELEPHONE (OUTPATIENT)
Dept: FAMILY MEDICINE | Facility: OTHER | Age: 64
End: 2024-03-14

## 2024-03-28 ENCOUNTER — ANCILLARY PROCEDURE (OUTPATIENT)
Dept: MAMMOGRAPHY | Facility: OTHER | Age: 64
End: 2024-03-28
Attending: FAMILY MEDICINE
Payer: COMMERCIAL

## 2024-03-28 DIAGNOSIS — Z12.31 VISIT FOR SCREENING MAMMOGRAM: ICD-10-CM

## 2024-03-28 PROCEDURE — 77063 BREAST TOMOSYNTHESIS BI: CPT | Mod: TC

## 2024-04-01 ENCOUNTER — TELEPHONE (OUTPATIENT)
Dept: MAMMOGRAPHY | Facility: OTHER | Age: 64
End: 2024-04-01

## 2024-04-19 NOTE — H&P (VIEW-ONLY)
Preoperative Evaluation  [facility] [place]  [address]  [place] [address]  Phone: [phone]  Primary Provider: Ashli Masterson  Pre-op Performing Provider: ASHLI MASTERSON  Apr 22, 2024       Rosa is a 63 year old, presenting for the following:  Pre-Op Exam        4/22/2024     1:59 PM   Additional Questions   Roomed by Brayden Chappell   Accompanied by None         4/22/2024     1:59 PM   Patient Reported Additional Medications   Patient reports taking the following new medications None     Surgical Information  Surgery/Procedure: Right Direct Anterior Total Hip Arthroplasty   [place] [facility] [place]  [place]geon: Dr. Jose   Surgery Date: 4/29/2024  Time of Surgery: TBD  Where patient plans to recover: At home with family - after night in hospital  Fax number for surgical facility: Note does not need to be faxed, will be available electronically in Epic.    Assessment & Plan     The proposed surgical procedure is considered INTERMEDIATE risk.    Preop general physical exam  Proceed as planned.  Labs pending.  - EKG 12-lead complete w/read - (Clinic Performed)  - Comprehensive metabolic panel (BMP + Alb, Alk Phos, ALT, AST, Total. Bili, TP); Future  - CBC with platelets and differential; Future  - Reflex INR; Future  - Partial thromboplastin time; Future  - UA Macroscopic with reflex to Microscopic and Culture; Future    Primary osteoarthritis of right hip  As above.  - EKG 12-lead complete w/read - (Clinic Performed)  - Comprehensive metabolic panel (BMP + Alb, Alk Phos, ALT, AST, Total. Bili, TP); Future  - CBC with platelets and differential; Future  - Reflex INR; Future  - Partial thromboplastin time; Future  - UA Macroscopic with reflex to Microscopic and Culture; Future    Primary osteoarthritis, other specified site  As above.  - Reflex INR; Future  - Partial thromboplastin time; Future    MS (multiple sclerosis) (H)  In remission.    Moderate persistent asthma without  complication  Stable.  No recent flares.    Hyperlipidemia, unspecified hyperlipidemia type  Intolerant to statin.  Consider zetia.  Labs pending.  - Lipid Profile (Chol, Trig, HDL, LDL calc); Future    Essential hypertension  Fair control.  Continue Ramipril 10 mg daily.  BP has been elevated with surgeries- was instructed to take her Ramipril - not hold it day of.    Osteopenia, unspecified location  On fosamax.  Dexa q2 years.    Other specified disorders of central nervous system  - Partial thromboplastin time; Future    Thyroid nodule  Overdue for annual US surveillance - order placed.  TSH normal 11/2023.  - US Thyroid; Future    Insomnia, unspecified type  Chronic intermittent.  Requesting to go back to Trazodone - restart 50 mg hs.  Suggested she wait until after surgery.  - traZODone (DESYREL) 50 MG tablet; Take 1 tablet (50 mg) by mouth at bedtime           Risks and Recommendations  The patient has the following additional risks and recommendations for perioperative complications:   - No identified additional risk factors other than previously addressed    Antiplatelet or Anticoagulation Medication Instructions   - aspirin: Discontinue aspirin 7-10 days prior to procedure to reduce bleeding risk. It should be resumed postoperatively.     Additional Medication Instructions  Patient is to take all scheduled medications on the day of surgery EXCEPT for modifications listed below:   - ACE/ARB: HOLD on day of surgery (minimum 11 hours for general anesthesia).   - ibuprofen (Advil, Motrin): HOLD 1 day before surgery.    - SSRIs, SNRIs, TCAs, Antipsychotics: Continue without modification.    - rescue Inhaler: Continue PRN. Bring to hospital on the day of surgery.   - theophylline: Hold the evening before surgery.    - Herbal medications and vitamins: HOLD 14 days prior to surgery.    Recommendation  APPROVAL GIVEN to proceed with proposed procedure, without further diagnostic evaluation.    Review of prior  external note(s) from - Outside records from ortho associates  30 minutes spent by me on the date of the encounter doing chart review, history and exam, documentation and further activities per the note    Subjective       HPI related to upcoming procedure: Patient with hip arthritis s/p therapy and joint injections (and SI joint fusion).  Planning hip replacement.          2024     1:59 PM   Preop Questions   1. Have you ever had a heart attack or stroke? No   2. Have you ever had surgery on your heart or blood vessels, such as a stent placement, a coronary artery bypass, or surgery on an artery in your head, neck, heart, or legs? No   3. Do you have chest pain with activity? No   4. Do you have a history of  heart failure? No   5. Do you currently have a cold, bronchitis or symptoms of other infection? No   6. Do you have a cough, shortness of breath, or wheezing? YES - Patient has asthma    7. Do you or anyone in your family have previous history of blood clots? YES- Brother, father and sister had hx of blood clots but are  now  - related to heart valve replacement   8. Do you or does anyone in your family have a serious bleeding problem such as prolonged bleeding following surgeries or cuts? No   9. Have you ever had problems with anemia or been told to take iron pills? No   10. Have you had any abnormal blood loss such as black, tarry or bloody stools, or abnormal vaginal bleeding? No   11. Have you ever had a blood transfusion? No   12. Are you willing to have a blood transfusion if it is medically needed before, during, or after your surgery? Yes   13. Have you or any of your relatives ever had problems with anesthesia? YES - Patient does not wake up easily and states that she can't have Fentanyl -   14. Do you have sleep apnea, excessive snoring or daytime drowsiness? No   15. Do you have any artifical heart valves or other implanted medical devices like a pacemaker, defibrillator, or continuous  glucose monitor? No   16. Do you have artificial joints? YES -SI joint infusion right side   17. Are you allergic to latex? No     Health Care Directive  Patient does not have a Health Care Directive or Living Will: Discussed advance care planning with patient; however, patient declined at this time.    Preoperative Review of    reviewed - no record of controlled substances prescribed.      Status of Chronic Conditions:  See problem list for active medical problems.  Problems all longstanding and stable, except as noted/documented.  See ROS for pertinent symptoms related to these conditions.    ASTHMA - Patient has a longstanding history of moderate-severe Asthma . Patient has been doing well overall noting NO SYMPTOMS and continues on medication regimen consisting of Theophylline, Albuterol, zyrtec and Nasonex without adverse reactions or side effects.     HYPERLIPIDEMIA - Patient has a long history of significant Hyperlipidemia.  Not on statin.  3/2023.  Non fasting; muffin, banana, turkey/cheese/crackers.    HYPERTENSION - Patient has longstanding history of HTN , currently denies any symptoms referable to elevated blood pressure. Specifically denies chest pain, palpitations, dyspnea, orthopnea, PND or peripheral edema. Blood pressure readings have been in normal range. Current medication regimen is as listed below. Patient denies any side effects of medication.     HYPOTHYROIDISM - Patient has a longstanding history of chronic Hypothyroidism. Patient has been doing well, noting no tremor, insomnia, hair loss or changes in skin texture. Continues to take medications as directed, without adverse reactions or side effects. Last TSH   Lab Results   Component Value Date    TSH 1.28 11/29/2023   Thyroid nodule - US annual - last done 1/2023 - due again    OSTEOPENIA - last dexa 3/2023 - FRAX 7.5%/0.3%    MS - in remission.    INSOMNIA  - prior Trazodone - effective; no side effects.  Trouble falling and  staying asleep.  Chronic intermittent - prior double concussion    Patient Active Problem List    Diagnosis Date Noted    Osteopenia 03/02/2023     Priority: Medium    Esophagitis 10/13/2022     Priority: Medium    Essential hypertension 10/13/2022     Priority: Medium    Hyperlipidemia 10/13/2022     Priority: Medium    MS (multiple sclerosis) (H) 10/13/2022     Priority: Medium     history of multiple sclerosis, but has been in remission for years, not on current treatment.        Thyroid nodule 10/13/2022     Priority: Medium     Jan 2022 - stable nodules with recommendations to recheck in 1 year.        Class 2 obesity due to excess calories without serious comorbidity with body mass index (BMI) of 36.0 to 36.9 in adult 10/13/2022     Priority: Medium    Lumbar spondylosis 06/08/2022     Priority: Medium     S/p Left Radiofrequency percutaneous neurotomy of the L3, L4 medial branches and the L5 dorsal ramus, to cover the facet joints of L4/L5 and L5/S1.         History of osteomyelitis 01/06/2022     Priority: Medium     Of jaw, secondary to tooth abscess. S/p IV abx and PICC. Had 3 surgeries and lost 7 teeth. Follow up with ID as needed.      Post concussive syndrome 12/21/2020     Priority: Medium     history of a fall at work on12/26/2019, and sustained a traumatic brain injury and has chronic headaches and postconcussive syndrome. She was taking the garbage out and hit a patch of ice and hit her buttock and her head. Did do occupational therapy and see neurooptics and had prism glasses.        Vasomotor rhinitis 06/29/2018     Priority: Medium    Diverticulosis 07/25/2017     Priority: Medium    Personal history of colonic polyps 07/25/2017     Priority: Medium     2/24/22 - rpt in five years       Dysphonia 10/21/2014     Priority: Medium    Moderate persistent asthma without complication 06/18/2012     Priority: Medium     on theophylline. She was seen by Dr. Moreno for her asthma in the past and this was  recommended by him. Her symptoms are tirggered by heat and humidity. She uses ventolin about once per day and feels symptoms.        Tubular adenoma 03/09/2009     Priority: Medium     On colonoscopy 2009.  Repeat colonoscopy in 1 year per GI Recommendations (see note).        Past Medical History:   Diagnosis Date    Asthma     Benign essential hypertension     Colon cancer (H) 2008    Osteopenia     2023 frax 7.5/0.6%    Post concussive syndrome     Thyroid nodule     4; repeat annual ultrsound     Past Surgical History:   Procedure Laterality Date    DILATION AND CURETTAGE, OPERATIVE HYSTEROSCOPY, COMBINED N/A 12/13/2023    Procedure: HYSTEROSCOPY, WITH DILATION AND CURETTAGE OF UTERUS;  Surgeon: Bakari Lott MD;  Location: [facility] OR    SALPINGO-OOPHORECTOMY BILATERAL Bilateral 12/13/2023    Procedure: LAPAROSCOPIC SALPINGO-OOPHORECTOMY, BILATERAL and peritoneal biopsies;  Surgeon: Bakari Lott MD;  Location: [facility] OR     Current Outpatient Medications   Medication Sig Dispense Refill    acetaminophen (TYLENOL) 500 MG tablet Take 1,000 mg by mouth      albuterol (PROAIR HFA/PROVENTIL HFA/VENTOLIN HFA) 108 (90 Base) MCG/ACT inhaler Inhale 2 puffs into the lungs every 4 hours as needed for shortness of breath or wheezing 18 g 3    aspirin (ASA) 81 MG EC tablet Take 81 mg by mouth      Calcium Citrate-Vitamin D 250-200 MG-UNIT TABS Take 1 tablet by mouth Takes 600mg      cetirizine (ZYRTEC) 10 MG tablet Take by mouth daily as needed      cholecalciferol (VITAMIN D3) 25 mcg (1000 units) capsule       fish oil-omega-3 fatty acids 1000 MG capsule       folic acid (FOLVITE) 400 MCG tablet       ibuprofen (ADVIL/MOTRIN) 600 MG tablet Take 1 tablet (600 mg) by mouth every 8 hours as needed for moderate pain 30 tablet 0    Lutein 10 MG TABS Take 20 mg by mouth daily      magnesium 100 MG CAPS Take 1 tablet by mouth daily      mometasone (NASONEX) 50 MCG/ACT nasal spray Spray 2 sprays in nostril daily 17 g 3    Probiotic  Product (PROBIOTIC-10 PO) Take 3 mg by mouth daily      ramipril (ALTACE) 10 MG capsule TAKE 1 CAPSULE BY MOUTH DAILY 90 capsule 2    theophylline (UNIPHYL) 400 MG 24 hr tablet TAKE 1 TABLET BY MOUTH DAILY 90 tablet 2    vitamin A 3 MG (61283 UNITS) capsule Take 2,000 Units by mouth      Zinc 50 MG CAPS       methocarbamol (ROBAXIN) 750 MG tablet Take 1 tablet (750 mg) by mouth 4 times daily as needed for muscle spasms (Patient not taking: Reported on 2024) 30 tablet 0    potassium gluconate 2.5 MEQ TABS Take 1 tablet by mouth every morning (Patient not taking: Reported on 2023)         Allergies   Allergen Reactions    Codeine Shortness Of Breath and Swelling    Fentanyl Other (See Comments)     Bradycardia & Hypotension    Meloxicam Other (See Comments)     Hypersensitive, SOB, insomnia, HTN    No Clinical Screening - See Comments Other (See Comments) and Shortness Of Breath     MOLD, GRASSES. Some cleaning products, perfumes, tar,  Skunk scent    Prochlorperazine Hives, Shortness Of Breath and Swelling     COMPAZINE      Shellfish Allergy Hives, Shortness Of Breath and Swelling    Amlodipine Other (See Comments)     Hip pain and edema.    Cyclobenzaprine Other (See Comments)     Jittery, leg cramps, insomnia,     Losartan Cough, Itching and Muscle Pain (Myalgia)    Fluticasone Swelling    Oxycodone Headache and Nausea    Budesonide-Formoterol Fumarate Other (See Comments) and Palpitations     Leg pains        Social History     Tobacco Use    Smoking status: Former     Current packs/day: 0.00     Average packs/day: 2.0 packs/day for 14.0 years (27.9 ttl pk-yrs)     Types: Cigarettes     Start date: 1970     Quit date: 1977     Years since quittin.3     Passive exposure: Past    Smokeless tobacco: Never   Substance Use Topics    Alcohol use: Not Currently     Family History   Problem Relation Age of Onset    Alzheimer Disease Mother     Alcoholism Mother     Myocardial Infarction Mother   "   Thrombosis Mother     Cerebrovascular Disease Father     Hearing Loss Father     Myocardial Infarction Father     Valvular heart disease Brother     Cerebrovascular Disease Brother     Cancer Brother     Hypertension Brother     Skin Cancer Brother     Osteopenia Brother     Hypertension Sister     Diabetes Sister     Valvular heart disease Sister     Aortic Valve Replacement Sister     Hypertension Sister     Breast Cancer Sister     Macular Degeneration Sister     Hypertension Sister     Osteopenia Sister      History   Drug Use Unknown         Review of Systems    Review of Systems  Constitutional, HEENT, cardiovascular, pulmonary, gi and gu systems are negative, except as otherwise noted.    Objective    /84 (BP Location: Right arm, Patient Position: Sitting, Cuff Size: Adult Large)   Pulse 100   Temp 98.6  F (37  C) (Tympanic)   Resp 16   Wt 102.1 kg (225 lb)   SpO2 96%   BMI 38.02 kg/m     Estimated body mass index is 38.02 kg/m  as calculated from the following:    Height as of 12/21/23: 1.638 m (5' 4.5\").    Weight as of this encounter: 102.1 kg (225 lb).  Physical Exam  GENERAL: alert, no distress, and over weight  EYES: Eyes grossly normal to inspection, PERRL and conjunctivae and sclerae normal  HENT: ear canals and TM's normal, nose and mouth without ulcers or lesions  NECK: no adenopathy, no asymmetry, masses, or scars  RESP: lungs clear to auscultation - no rales, rhonchi or wheezes  CV: regular rate and rhythm, normal S1 S2, no S3 or S4, no murmur, click or rub, no peripheral edema  ABDOMEN: soft, nontender, no hepatosplenomegaly, no masses and bowel sounds normal  MS: no gross musculoskeletal defects noted, no edema  SKIN: no suspicious lesions or rashes  NEURO: Normal strength and tone, mentation intact and speech normal  PSYCH: mentation appears normal, affect normal/bright    Recent Labs   Lab Test 11/29/23  1429 08/30/23  1319   HGB 13.5 14.7    273    140   POTASSIUM " 4.0 4.2   CR 1.01* 0.91        Diagnostics  Labs pending at this time.  Results will be reviewed when available.  No results found for this or any previous visit (from the past 24 hour(s)).   EKG: Normal Sinus Rhythm, minimal criteria for LVH, normal axis, normal intervals, no acute ST/T changes c/w ischemia, Right Bundle Branch Block, unchanged from previous tracings non specific T wave in V2 leads; unchanged from prior EKG 8/2023    Revised Cardiac Risk Index (RCRI)  The patient has the following serious cardiovascular risks for perioperative complications:   - No serious cardiac risks = 0 points     RCRI Interpretation: 0 points: Class I (very low risk - 0.4% complication rate)         Signed Electronically by: Salma Rios MD  Copy of this evaluation report is provided to requesting physician.

## 2024-04-19 NOTE — PROGRESS NOTES
{PROVIDER CHARTING PREFERENCE:309760}    Margaret Lee is a 63 year old, presenting for the following health issues:  No chief complaint on file.  {(!) Visit Details have not yet been documented.  Please enter Visit Details and then use this list to pull in documentation. (Optional):969278}  HPI     Hyperlipidemia Follow-Up    Are you regularly taking any medication or supplement to lower your cholesterol?   { :990282}  Are you having muscle aches or other side effects that you think could be caused by your cholesterol lowering medication?  { :436195}    Hypertension Follow-up    Do you check your blood pressure regularly outside of the clinic? { :603055}   Are you following a low salt diet? { :261714}  Are your blood pressures ever more than 140 on the top number (systolic) OR more   than 90 on the bottom number (diastolic), for example 140/90? { :961991}    Asthma Follow-Up    Was ACT completed today?  { :184974}    {additonal problems for provider to add (Optional):437900}    {ROS Picklists (Optional):434269}      Objective    There were no vitals taken for this visit.  There is no height or weight on file to calculate BMI.  Physical Exam   {Exam List (Optional):489580}    {Diagnostic Test Results (Optional):666404}        Signed Electronically by: Salma Rios MD  {Email feedback regarding this note to [email or web address]   :823828}

## 2024-04-19 NOTE — PROGRESS NOTES
Preoperative Evaluation  [facility] [place]  [address]  [place] [address]  Phone: [phone]  Primary Provider: Ashli Masterson  Pre-op Performing Provider: ASHLI MASTERSON  Apr 22, 2024       Rosa is a 63 year old, presenting for the following:  Pre-Op Exam        4/22/2024     1:59 PM   Additional Questions   Roomed by Brayden Chappell   Accompanied by None         4/22/2024     1:59 PM   Patient Reported Additional Medications   Patient reports taking the following new medications None     Surgical Information  Surgery/Procedure: Right Direct Anterior Total Hip Arthroplasty   [facility]  Surgeon: Dr. Jose   Surgery Date: 4/29/2024  Time of Surgery: TBD  Where patient plans to recover: At home with family - after night in hospital  Fax number for surgical facility: Note does not need to be faxed, will be available electronically in Epic.    Assessment & Plan     The proposed surgical procedure is considered INTERMEDIATE risk.    Preop general physical exam  Proceed as planned.  Labs pending.  - EKG 12-lead complete w/read - (Clinic Performed)  - Comprehensive metabolic panel (BMP + Alb, Alk Phos, ALT, AST, Total. Bili, TP); Future  - CBC with platelets and differential; Future  - Reflex INR; Future  - Partial thromboplastin time; Future  - UA Macroscopic with reflex to Microscopic and Culture; Future    Primary osteoarthritis of right hip  As above.  - EKG 12-lead complete w/read - (Clinic Performed)  - Comprehensive metabolic panel (BMP + Alb, Alk Phos, ALT, AST, Total. Bili, TP); Future  - CBC with platelets and differential; Future  - Reflex INR; Future  - Partial thromboplastin time; Future  - UA Macroscopic with reflex to Microscopic and Culture; Future    Primary osteoarthritis, other specified site  As above.  - Reflex INR; Future  - Partial thromboplastin time; Future    MS (multiple sclerosis) (H)  In remission.    Moderate persistent asthma without  complication  Stable.  No recent flares.    Hyperlipidemia, unspecified hyperlipidemia type  Intolerant to statin.  Consider zetia.  Labs pending.  - Lipid Profile (Chol, Trig, HDL, LDL calc); Future    Essential hypertension  Fair control.  Continue Ramipril 10 mg daily.  BP has been elevated with surgeries- was instructed to take her Ramipril - not hold it day of.    Osteopenia, unspecified location  On fosamax.  Dexa q2 years.    Other specified disorders of central nervous system  - Partial thromboplastin time; Future    Thyroid nodule  Overdue for annual US surveillance - order placed.  TSH normal 11/2023.  - US Thyroid; Future    Insomnia, unspecified type  Chronic intermittent.  Requesting to go back to Trazodone - restart 50 mg hs.  Suggested she wait until after surgery.  - traZODone (DESYREL) 50 MG tablet; Take 1 tablet (50 mg) by mouth at bedtime           Risks and Recommendations  The patient has the following additional risks and recommendations for perioperative complications:   - No identified additional risk factors other than previously addressed    Antiplatelet or Anticoagulation Medication Instructions   - aspirin: Discontinue aspirin 7-10 days prior to procedure to reduce bleeding risk. It should be resumed postoperatively.     Additional Medication Instructions  Patient is to take all scheduled medications on the day of surgery EXCEPT for modifications listed below:   - ACE/ARB: HOLD on day of surgery (minimum 11 hours for general anesthesia).   - ibuprofen (Advil, Motrin): HOLD 1 day before surgery.    - SSRIs, SNRIs, TCAs, Antipsychotics: Continue without modification.    - rescue Inhaler: Continue PRN. Bring to hospital on the day of surgery.   - theophylline: Hold the evening before surgery.    - Herbal medications and vitamins: HOLD 14 days prior to surgery.    Recommendation  APPROVAL GIVEN to proceed with proposed procedure, without further diagnostic evaluation.    Review of prior  external note(s) from - Outside records from ortho associates  30 minutes spent by me on the date of the encounter doing chart review, history and exam, documentation and further activities per the note    Subjective       HPI related to upcoming procedure: Patient with hip arthritis s/p therapy and joint injections (and SI joint fusion).  Planning hip replacement.          2024     1:59 PM   Preop Questions   1. Have you ever had a heart attack or stroke? No   2. Have you ever had surgery on your heart or blood vessels, such as a stent placement, a coronary artery bypass, or surgery on an artery in your head, neck, heart, or legs? No   3. Do you have chest pain with activity? No   4. Do you have a history of  heart failure? No   5. Do you currently have a cold, bronchitis or symptoms of other infection? No   6. Do you have a cough, shortness of breath, or wheezing? YES - Patient has asthma    7. Do you or anyone in your family have previous history of blood clots? YES- Brother, father and sister had hx of blood clots but are  now  - related to heart valve replacement   8. Do you or does anyone in your family have a serious bleeding problem such as prolonged bleeding following surgeries or cuts? No   9. Have you ever had problems with anemia or been told to take iron pills? No   10. Have you had any abnormal blood loss such as black, tarry or bloody stools, or abnormal vaginal bleeding? No   11. Have you ever had a blood transfusion? No   12. Are you willing to have a blood transfusion if it is medically needed before, during, or after your surgery? Yes   13. Have you or any of your relatives ever had problems with anesthesia? YES - Patient does not wake up easily and states that she can't have Fentanyl -   14. Do you have sleep apnea, excessive snoring or daytime drowsiness? No   15. Do you have any artifical heart valves or other implanted medical devices like a pacemaker, defibrillator, or continuous  glucose monitor? No   16. Do you have artificial joints? YES -SI joint infusion right side   17. Are you allergic to latex? No     Health Care Directive  Patient does not have a Health Care Directive or Living Will: Discussed advance care planning with patient; however, patient declined at this time.    Preoperative Review of    reviewed - no record of controlled substances prescribed.      Status of Chronic Conditions:  See problem list for active medical problems.  Problems all longstanding and stable, except as noted/documented.  See ROS for pertinent symptoms related to these conditions.    ASTHMA - Patient has a longstanding history of moderate-severe Asthma . Patient has been doing well overall noting NO SYMPTOMS and continues on medication regimen consisting of Theophylline, Albuterol, zyrtec and Nasonex without adverse reactions or side effects.     HYPERLIPIDEMIA - Patient has a long history of significant Hyperlipidemia.  Not on statin.  3/2023.  Non fasting; muffin, banana, turkey/cheese/crackers.    HYPERTENSION - Patient has longstanding history of HTN , currently denies any symptoms referable to elevated blood pressure. Specifically denies chest pain, palpitations, dyspnea, orthopnea, PND or peripheral edema. Blood pressure readings have been in normal range. Current medication regimen is as listed below. Patient denies any side effects of medication.     HYPOTHYROIDISM - Patient has a longstanding history of chronic Hypothyroidism. Patient has been doing well, noting no tremor, insomnia, hair loss or changes in skin texture. Continues to take medications as directed, without adverse reactions or side effects. Last TSH   Lab Results   Component Value Date    TSH 1.28 11/29/2023   Thyroid nodule - US annual - last done 1/2023 - due again    OSTEOPENIA - last dexa 3/2023 - FRAX 7.5%/0.3%    MS - in remission.    INSOMNIA  - prior Trazodone - effective; no side effects.  Trouble falling and  staying asleep.  Chronic intermittent - prior double concussion    Patient Active Problem List    Diagnosis Date Noted    Osteopenia 03/02/2023     Priority: Medium    Esophagitis 10/13/2022     Priority: Medium    Essential hypertension 10/13/2022     Priority: Medium    Hyperlipidemia 10/13/2022     Priority: Medium    MS (multiple sclerosis) (H) 10/13/2022     Priority: Medium     history of multiple sclerosis, but has been in remission for years, not on current treatment.        Thyroid nodule 10/13/2022     Priority: Medium     Jan 2022 - stable nodules with recommendations to recheck in 1 year.        Class 2 obesity due to excess calories without serious comorbidity with body mass index (BMI) of 36.0 to 36.9 in adult 10/13/2022     Priority: Medium    Lumbar spondylosis 06/08/2022     Priority: Medium     S/p Left Radiofrequency percutaneous neurotomy of the L3, L4 medial branches and the L5 dorsal ramus, to cover the facet joints of L4/L5 and L5/S1.         History of osteomyelitis 01/06/2022     Priority: Medium     Of jaw, secondary to tooth abscess. S/p IV abx and PICC. Had 3 surgeries and lost 7 teeth. Follow up with ID as needed.      Post concussive syndrome 12/21/2020     Priority: Medium     history of a fall at work on12/26/2019, and sustained a traumatic brain injury and has chronic headaches and postconcussive syndrome. She was taking the garbage out and hit a patch of ice and hit her buttock and her head. Did do occupational therapy and see neurooptics and had prism glasses.        Vasomotor rhinitis 06/29/2018     Priority: Medium    Diverticulosis 07/25/2017     Priority: Medium    Personal history of colonic polyps 07/25/2017     Priority: Medium     2/24/22 - rpt in five years       Dysphonia 10/21/2014     Priority: Medium    Moderate persistent asthma without complication 06/18/2012     Priority: Medium     on theophylline. She was seen by Dr. Moreno for her asthma in the past and this was  recommended by him. Her symptoms are tirggered by heat and humidity. She uses ventolin about once per day and feels symptoms.        Tubular adenoma 03/09/2009     Priority: Medium     On colonoscopy 2009.  Repeat colonoscopy in 1 year per GI Recommendations (see note).        Past Medical History:   Diagnosis Date    Asthma     Benign essential hypertension     Colon cancer (H) 2008    Osteopenia     2023 frax 7.5/0.6%    Post concussive syndrome     Thyroid nodule     4; repeat annual ultrsound     Past Surgical History:   Procedure Laterality Date    DILATION AND CURETTAGE, OPERATIVE HYSTEROSCOPY, COMBINED N/A 12/13/2023    Procedure: HYSTEROSCOPY, WITH DILATION AND CURETTAGE OF UTERUS;  Surgeon: Bakari Lott MD;  Location: [facility]    SALPINGO-OOPHORECTOMY BILATERAL Bilateral 12/13/2023    Procedure: LAPAROSCOPIC SALPINGO-OOPHORECTOMY, BILATERAL and peritoneal biopsies;  Surgeon: Bakari Lott MD;  Location: [facility]     Current Outpatient Medications   Medication Sig Dispense Refill    acetaminophen (TYLENOL) 500 MG tablet Take 1,000 mg by mouth      albuterol (PROAIR HFA/PROVENTIL HFA/VENTOLIN HFA) 108 (90 Base) MCG/ACT inhaler Inhale 2 puffs into the lungs every 4 hours as needed for shortness of breath or wheezing 18 g 3    aspirin (ASA) 81 MG EC tablet Take 81 mg by mouth      Calcium Citrate-Vitamin D 250-200 MG-UNIT TABS Take 1 tablet by mouth Takes 600mg      cetirizine (ZYRTEC) 10 MG tablet Take by mouth daily as needed      cholecalciferol (VITAMIN D3) 25 mcg (1000 units) capsule       fish oil-omega-3 fatty acids 1000 MG capsule       folic acid (FOLVITE) 400 MCG tablet       ibuprofen (ADVIL/MOTRIN) 600 MG tablet Take 1 tablet (600 mg) by mouth every 8 hours as needed for moderate pain 30 tablet 0    Lutein 10 MG TABS Take 20 mg by mouth daily      magnesium 100 MG CAPS Take 1 tablet by mouth daily      mometasone (NASONEX) 50 MCG/ACT nasal spray Spray 2 sprays in nostril daily 17 g 3    Probiotic  Product (PROBIOTIC-10 PO) Take 3 mg by mouth daily      ramipril (ALTACE) 10 MG capsule TAKE 1 CAPSULE BY MOUTH DAILY 90 capsule 2    theophylline (UNIPHYL) 400 MG 24 hr tablet TAKE 1 TABLET BY MOUTH DAILY 90 tablet 2    vitamin A 3 MG (97884 UNITS) capsule Take 2,000 Units by mouth      Zinc 50 MG CAPS       methocarbamol (ROBAXIN) 750 MG tablet Take 1 tablet (750 mg) by mouth 4 times daily as needed for muscle spasms (Patient not taking: Reported on 2024) 30 tablet 0    potassium gluconate 2.5 MEQ TABS Take 1 tablet by mouth every morning (Patient not taking: Reported on 2023)         Allergies   Allergen Reactions    Codeine Shortness Of Breath and Swelling    Fentanyl Other (See Comments)     Bradycardia & Hypotension    Meloxicam Other (See Comments)     Hypersensitive, SOB, insomnia, HTN    No Clinical Screening - See Comments Other (See Comments) and Shortness Of Breath     MOLD, GRASSES. Some cleaning products, perfumes, tar,  Skunk scent    Prochlorperazine Hives, Shortness Of Breath and Swelling     COMPAZINE      Shellfish Allergy Hives, Shortness Of Breath and Swelling    Amlodipine Other (See Comments)     Hip pain and edema.    Cyclobenzaprine Other (See Comments)     Jittery, leg cramps, insomnia,     Losartan Cough, Itching and Muscle Pain (Myalgia)    Fluticasone Swelling    Oxycodone Headache and Nausea    Budesonide-Formoterol Fumarate Other (See Comments) and Palpitations     Leg pains        Social History     Tobacco Use    Smoking status: Former     Current packs/day: 0.00     Average packs/day: 2.0 packs/day for 14.0 years (27.9 ttl pk-yrs)     Types: Cigarettes     Start date: 1970     Quit date: 1977     Years since quittin.3     Passive exposure: Past    Smokeless tobacco: Never   Substance Use Topics    Alcohol use: Not Currently     Family History   Problem Relation Age of Onset    Alzheimer Disease Mother     Alcoholism Mother     Myocardial Infarction Mother   "   Thrombosis Mother     Cerebrovascular Disease Father     Hearing Loss Father     Myocardial Infarction Father     Valvular heart disease Brother     Cerebrovascular Disease Brother     Cancer Brother     Hypertension Brother     Skin Cancer Brother     Osteopenia Brother     Hypertension Sister     Diabetes Sister     Valvular heart disease Sister     Aortic Valve Replacement Sister     Hypertension Sister     Breast Cancer Sister     Macular Degeneration Sister     Hypertension Sister     Osteopenia Sister      History   Drug Use Unknown         Review of Systems    Review of Systems  Constitutional, HEENT, cardiovascular, pulmonary, gi and gu systems are negative, except as otherwise noted.    Objective    /84 (BP Location: Right arm, Patient Position: Sitting, Cuff Size: Adult Large)   Pulse 100   Temp 98.6  F (37  C) (Tympanic)   Resp 16   Wt 102.1 kg (225 lb)   SpO2 96%   BMI 38.02 kg/m     Estimated body mass index is 38.02 kg/m  as calculated from the following:    Height as of 12/21/23: 1.638 m (5' 4.5\").    Weight as of this encounter: 102.1 kg (225 lb).  Physical Exam  GENERAL: alert, no distress, and over weight  EYES: Eyes grossly normal to inspection, PERRL and conjunctivae and sclerae normal  HENT: ear canals and TM's normal, nose and mouth without ulcers or lesions  NECK: no adenopathy, no asymmetry, masses, or scars  RESP: lungs clear to auscultation - no rales, rhonchi or wheezes  CV: regular rate and rhythm, normal S1 S2, no S3 or S4, no murmur, click or rub, no peripheral edema  ABDOMEN: soft, nontender, no hepatosplenomegaly, no masses and bowel sounds normal  MS: no gross musculoskeletal defects noted, no edema  SKIN: no suspicious lesions or rashes  NEURO: Normal strength and tone, mentation intact and speech normal  PSYCH: mentation appears normal, affect normal/bright    Recent Labs   Lab Test 11/29/23  1429 08/30/23  1319   HGB 13.5 14.7    273    140   POTASSIUM " 4.0 4.2   CR 1.01* 0.91        Diagnostics  Labs pending at this time.  Results will be reviewed when available.  No results found for this or any previous visit (from the past 24 hour(s)).   EKG: Normal Sinus Rhythm, minimal criteria for LVH, normal axis, normal intervals, no acute ST/T changes c/w ischemia, Right Bundle Branch Block, unchanged from previous tracings non specific T wave in V2 leads; unchanged from prior EKG 8/2023    Revised Cardiac Risk Index (RCRI)  The patient has the following serious cardiovascular risks for perioperative complications:   - No serious cardiac risks = 0 points     RCRI Interpretation: 0 points: Class I (very low risk - 0.4% complication rate)         Signed Electronically by: Salma Rios MD  Copy of this evaluation report is provided to requesting physician.

## 2024-04-22 ENCOUNTER — OFFICE VISIT (OUTPATIENT)
Dept: FAMILY MEDICINE | Facility: OTHER | Age: 64
End: 2024-04-22
Attending: FAMILY MEDICINE
Payer: COMMERCIAL

## 2024-04-22 ENCOUNTER — ANESTHESIA EVENT (OUTPATIENT)
Dept: SURGERY | Facility: HOSPITAL | Age: 64
End: 2024-04-22
Payer: COMMERCIAL

## 2024-04-22 VITALS
SYSTOLIC BLOOD PRESSURE: 128 MMHG | BODY MASS INDEX: 38.02 KG/M2 | RESPIRATION RATE: 16 BRPM | TEMPERATURE: 98.6 F | DIASTOLIC BLOOD PRESSURE: 84 MMHG | OXYGEN SATURATION: 96 % | WEIGHT: 225 LBS | HEART RATE: 100 BPM

## 2024-04-22 DIAGNOSIS — J45.40 MODERATE PERSISTENT ASTHMA WITHOUT COMPLICATION: ICD-10-CM

## 2024-04-22 DIAGNOSIS — G47.00 INSOMNIA, UNSPECIFIED TYPE: ICD-10-CM

## 2024-04-22 DIAGNOSIS — I10 ESSENTIAL HYPERTENSION: Chronic | ICD-10-CM

## 2024-04-22 DIAGNOSIS — G35 MS (MULTIPLE SCLEROSIS) (H): ICD-10-CM

## 2024-04-22 DIAGNOSIS — E78.5 HYPERLIPIDEMIA, UNSPECIFIED HYPERLIPIDEMIA TYPE: Chronic | ICD-10-CM

## 2024-04-22 DIAGNOSIS — M16.11 PRIMARY OSTEOARTHRITIS OF RIGHT HIP: ICD-10-CM

## 2024-04-22 DIAGNOSIS — E04.1 THYROID NODULE: ICD-10-CM

## 2024-04-22 DIAGNOSIS — R80.9 PROTEINURIA, UNSPECIFIED TYPE: ICD-10-CM

## 2024-04-22 DIAGNOSIS — Z01.818 PREOP GENERAL PHYSICAL EXAM: Primary | ICD-10-CM

## 2024-04-22 DIAGNOSIS — G96.89 OTHER SPECIFIED DISORDERS OF CENTRAL NERVOUS SYSTEM: ICD-10-CM

## 2024-04-22 DIAGNOSIS — M85.80 OSTEOPENIA, UNSPECIFIED LOCATION: ICD-10-CM

## 2024-04-22 DIAGNOSIS — M19.09 PRIMARY OSTEOARTHRITIS, OTHER SPECIFIED SITE: ICD-10-CM

## 2024-04-22 LAB
ALBUMIN SERPL BCG-MCNC: 4.4 G/DL (ref 3.5–5.2)
ALP SERPL-CCNC: 144 U/L (ref 40–150)
ALT SERPL W P-5'-P-CCNC: 23 U/L (ref 0–50)
ANION GAP SERPL CALCULATED.3IONS-SCNC: 10 MMOL/L (ref 7–15)
APTT PPP: 29 SECONDS (ref 22–38)
AST SERPL W P-5'-P-CCNC: 32 U/L (ref 0–45)
BASOPHILS # BLD AUTO: 0.1 10E3/UL (ref 0–0.2)
BASOPHILS NFR BLD AUTO: 1 %
BILIRUB SERPL-MCNC: 0.3 MG/DL
BUN SERPL-MCNC: 18.9 MG/DL (ref 8–23)
CALCIUM SERPL-MCNC: 9.7 MG/DL (ref 8.8–10.2)
CHLORIDE SERPL-SCNC: 105 MMOL/L (ref 98–107)
CHOLEST SERPL-MCNC: 214 MG/DL
CREAT SERPL-MCNC: 1.12 MG/DL (ref 0.51–0.95)
DEPRECATED HCO3 PLAS-SCNC: 25 MMOL/L (ref 22–29)
EGFRCR SERPLBLD CKD-EPI 2021: 54 ML/MIN/1.73M2
EOSINOPHIL # BLD AUTO: 0.2 10E3/UL (ref 0–0.7)
EOSINOPHIL NFR BLD AUTO: 3 %
ERYTHROCYTE [DISTWIDTH] IN BLOOD BY AUTOMATED COUNT: 14.3 % (ref 10–15)
FASTING STATUS PATIENT QL REPORTED: NO
GLUCOSE SERPL-MCNC: 105 MG/DL (ref 70–99)
HCT VFR BLD AUTO: 38.2 % (ref 35–47)
HDLC SERPL-MCNC: 52 MG/DL
HGB BLD-MCNC: 12.6 G/DL (ref 11.7–15.7)
IMM GRANULOCYTES # BLD: 0 10E3/UL
IMM GRANULOCYTES NFR BLD: 0 %
INR PPP: 0.98 (ref 0.85–1.15)
LDLC SERPL CALC-MCNC: 112 MG/DL
LYMPHOCYTES # BLD AUTO: 1.6 10E3/UL (ref 0.8–5.3)
LYMPHOCYTES NFR BLD AUTO: 25 %
MCH RBC QN AUTO: 26.8 PG (ref 26.5–33)
MCHC RBC AUTO-ENTMCNC: 33 G/DL (ref 31.5–36.5)
MCV RBC AUTO: 81 FL (ref 78–100)
MONOCYTES # BLD AUTO: 0.7 10E3/UL (ref 0–1.3)
MONOCYTES NFR BLD AUTO: 11 %
NEUTROPHILS # BLD AUTO: 3.9 10E3/UL (ref 1.6–8.3)
NEUTROPHILS NFR BLD AUTO: 60 %
NONHDLC SERPL-MCNC: 162 MG/DL
NRBC # BLD AUTO: 0 10E3/UL
NRBC BLD AUTO-RTO: 0 /100
PLATELET # BLD AUTO: 305 10E3/UL (ref 150–450)
POTASSIUM SERPL-SCNC: 4 MMOL/L (ref 3.4–5.3)
PROT SERPL-MCNC: 7.2 G/DL (ref 6.4–8.3)
RBC # BLD AUTO: 4.7 10E6/UL (ref 3.8–5.2)
SODIUM SERPL-SCNC: 140 MMOL/L (ref 135–145)
TRIGL SERPL-MCNC: 251 MG/DL
WBC # BLD AUTO: 6.4 10E3/UL (ref 4–11)

## 2024-04-22 PROCEDURE — 99214 OFFICE O/P EST MOD 30 MIN: CPT | Performed by: FAMILY MEDICINE

## 2024-04-22 PROCEDURE — 85610 PROTHROMBIN TIME: CPT | Mod: ZL | Performed by: FAMILY MEDICINE

## 2024-04-22 PROCEDURE — 36415 COLL VENOUS BLD VENIPUNCTURE: CPT | Mod: ZL | Performed by: FAMILY MEDICINE

## 2024-04-22 PROCEDURE — 93010 ELECTROCARDIOGRAM REPORT: CPT | Mod: 77 | Performed by: INTERNAL MEDICINE

## 2024-04-22 PROCEDURE — 82040 ASSAY OF SERUM ALBUMIN: CPT | Mod: ZL | Performed by: FAMILY MEDICINE

## 2024-04-22 PROCEDURE — 93005 ELECTROCARDIOGRAM TRACING: CPT | Performed by: FAMILY MEDICINE

## 2024-04-22 PROCEDURE — 85025 COMPLETE CBC W/AUTO DIFF WBC: CPT | Mod: ZL | Performed by: FAMILY MEDICINE

## 2024-04-22 PROCEDURE — 85730 THROMBOPLASTIN TIME PARTIAL: CPT | Mod: ZL | Performed by: FAMILY MEDICINE

## 2024-04-22 PROCEDURE — G0463 HOSPITAL OUTPT CLINIC VISIT: HCPCS

## 2024-04-22 PROCEDURE — 82465 ASSAY BLD/SERUM CHOLESTEROL: CPT | Mod: ZL | Performed by: FAMILY MEDICINE

## 2024-04-22 RX ORDER — TRAZODONE HYDROCHLORIDE 50 MG/1
50 TABLET, FILM COATED ORAL AT BEDTIME
Qty: 30 TABLET | Refills: 1 | Status: SHIPPED | OUTPATIENT
Start: 2024-04-22 | End: 2024-06-13

## 2024-04-22 RX ORDER — THEOPHYLLINE 400 MG/1
400 TABLET, EXTENDED RELEASE ORAL DAILY
Qty: 90 TABLET | Refills: 0 | Status: SHIPPED | OUTPATIENT
Start: 2024-04-22 | End: 2024-08-06

## 2024-04-22 RX ORDER — RAMIPRIL 10 MG/1
10 CAPSULE ORAL DAILY
Qty: 90 CAPSULE | Refills: 0 | Status: SHIPPED | OUTPATIENT
Start: 2024-04-22 | End: 2024-07-09

## 2024-04-22 ASSESSMENT — ASTHMA QUESTIONNAIRES
QUESTION_5 LAST FOUR WEEKS HOW WOULD YOU RATE YOUR ASTHMA CONTROL: WELL CONTROLLED
QUESTION_2 LAST FOUR WEEKS HOW OFTEN HAVE YOU HAD SHORTNESS OF BREATH: ONCE A DAY
QUESTION_3 LAST FOUR WEEKS HOW OFTEN DID YOUR ASTHMA SYMPTOMS (WHEEZING, COUGHING, SHORTNESS OF BREATH, CHEST TIGHTNESS OR PAIN) WAKE YOU UP AT NIGHT OR EARLIER THAN USUAL IN THE MORNING: NOT AT ALL
QUESTION_1 LAST FOUR WEEKS HOW MUCH OF THE TIME DID YOUR ASTHMA KEEP YOU FROM GETTING AS MUCH DONE AT WORK, SCHOOL OR AT HOME: NONE OF THE TIME
ACT_TOTALSCORE: 18
ACT_TOTALSCORE: 18
QUESTION_4 LAST FOUR WEEKS HOW OFTEN HAVE YOU USED YOUR RESCUE INHALER OR NEBULIZER MEDICATION (SUCH AS ALBUTEROL): ONE OR TWO TIMES PER DAY

## 2024-04-22 ASSESSMENT — PAIN SCALES - GENERAL: PAINLEVEL: WORST PAIN (10)

## 2024-04-22 NOTE — ANESTHESIA PREPROCEDURE EVALUATION
Anesthesia Pre-Procedure Evaluation    Patient: Rosa Alcocer   MRN: [id number] : 1960        Procedure : Procedure(s):  Right Direct Anterior Total Hip Arthroplasty          Past Medical History:   Diagnosis Date     Asthma      Benign essential hypertension      Colon cancer (H)      Osteopenia      frax 7.5/0.6%     Post concussive syndrome      Thyroid nodule     4; repeat annual ultrsound      Past Surgical History:   Procedure Laterality Date     DILATION AND CURETTAGE, OPERATIVE HYSTEROSCOPY, COMBINED N/A 2023    Procedure: HYSTEROSCOPY, WITH DILATION AND CURETTAGE OF UTERUS;  Surgeon: Bakari Lott MD;  Location: [facility]     SALPINGO-OOPHORECTOMY BILATERAL Bilateral 2023    Procedure: LAPAROSCOPIC SALPINGO-OOPHORECTOMY, BILATERAL and peritoneal biopsies;  Surgeon: Bakari Lott MD;  Location: [facility]      Allergies   Allergen Reactions     Codeine Shortness Of Breath and Swelling     Fentanyl Other (See Comments)     Bradycardia & Hypotension     Meloxicam Other (See Comments)     Hypersensitive, SOB, insomnia, HTN     No Clinical Screening - See Comments Other (See Comments) and Shortness Of Breath     MOLD, GRASSES. Some cleaning products, perfumes, tar,  Skunk scent     Prochlorperazine Hives, Shortness Of Breath and Swelling     COMPAZINE       Shellfish Allergy Hives, Shortness Of Breath and Swelling     Amlodipine Other (See Comments)     Hip pain and edema.     Cyclobenzaprine Other (See Comments)     Jittery, leg cramps, insomnia,      Losartan Cough, Itching and Muscle Pain (Myalgia)     Fluticasone Swelling     Oxycodone Headache and Nausea     Budesonide-Formoterol Fumarate Other (See Comments) and Palpitations     Leg pains      Social History     Tobacco Use     Smoking status: Former     Current packs/day: 0.00     Average packs/day: 2.0 packs/day for 14.0 years (27.9 ttl pk-yrs)     Types: Cigarettes     Start date: 1970     Quit date: 1977     Years since  quittin.3     Passive exposure: Past     Smokeless tobacco: Never   Substance Use Topics     Alcohol use: Not Currently      Wt Readings from Last 1 Encounters:   24 102.1 kg (225 lb)        Anesthesia Evaluation   Pt has had prior anesthetic. Type: MAC and General (Patient does not wake up easily and states that she can't have Fentanyl).    History of anesthetic complications  - .  Patient does not wake up easily and states that she can't have Fentanyl-was related to a colonoscopy done in [facility].    ROS/MED HX  ENT/Pulmonary: Comment: Dysphonia  Vasomotor rhinitis    (+)     VIBHA risk factors,  hypertension, obese,        tobacco use (quit ), Past use,  28  Pack-Year Hx,   Moderate Persistent, asthma (Patient has a longstanding history of moderate-severe Asthma . Patient has been doing well overall noting NO SYMPTOMS and continues on medication regimen consisting of Theophylline, Albuterol, zyrtec and Nasonex without adverse reactions or side effects.)  Treatment: Inhaler daily and Inhaler prn,                 Neurologic: Comment: Post concussive syndrome  Lumbar spondylosis  Insomnia     S/p Left Radiofrequency percutaneous neurotomy of the L3, L4 medial branches and the L5 dorsal ramus, to cover the facet joints of L4/L5 and L5/S1    history of a fall at work on2019, and sustained a traumatic brain injury and has chronic headaches and postconcussive syndrome. She was taking the garbage out and hit a patch of ice and hit her buttock and her head. Did do occupational therapy and see neurooptics and had prism glasses.    (+)                   Multiple Sclerosis, limitations: history of multiple sclerosis, but has been in remission for years, not on current treatment-no relaspe for 5/6 years per patient.         (-) no CVA   Cardiovascular: Comment: Mild tricuspid regurgitation 2006   mild aortic and tricuspid regurgitation. Normal left ventricular size and function. Normal right sided  pressures by doppler,     BP has been elevated with surgeries- was instructed to take her Ramipril - not hold it day of. (24 HP)      (+) Dyslipidemia hypertension-range: 128/84 (24)/ -   -  - -   Taking blood thinners (aspirin) Pt has received instructions: Instructions Given to patient: Discontinue aspirin 7-10 days prior to procedure.                            Previous cardiac testing   Echo: Date: 2006 Results:  Transthoracic Echo  CONCLUSION:  1. Normal left ventricular size and function.  2. Mild aortic and tricuspid regurgitation.  3. Normal right-sided cardiac pressures by Doppler.  4. Right-sided cardiac chambers do not appear significantly enlarged on this study     Per cards note : stress echocardiogram that did suggest some right-sided cardiac  chamber enlargement, but otherwise the left ventricle was normal size with  normal function and no evidence of ischemia     Stress Test:  Date: Results:    ECG Reviewed:  Date: 24 Results:  Sinus rhythm   Right bundle branch block   Minimal voltage criteria for LVH, may be normal variant ( R in aVL )   Possible Inferior infarct , age undetermined   T wave abnormality, consider lateral ischemia   Abnormal ECG     Cath:  Date: Results:   (-) CHF and FRANCISCO   METS/Exercise Tolerance:     Hematologic: Comments: Brother, father and sister had hx of blood clots but are  now  - related to heart valve replacement.  No noted personal history of blood clots.      Musculoskeletal: Comment: Osteopenia (on fosamax)  History of osteomyelitis-Jaw  (+)  arthritis,          Muscular Dystrophy: right hip.   GI/Hepatic: Comment: Diverticulosis    (+)   esophageal disease,                 Renal/Genitourinary:  - neg Renal ROS     Endo: Comment: Nodule is being watched, biopsy scheduled for May-has been coughing, no trouble swallowing pills or food    (+)          thyroid problem, hypothyroidism nodule,    Obesity,       Psychiatric/Substance Use:  - neg  "psychiatric ROS     Infectious Disease:  - neg infectious disease ROS     Malignancy:   (+) Malignancy (colon), History of GI.GI CA  Remission status post.      Other:  - neg other ROS          Physical Exam    Airway  airway exam normal      Mallampati: III   TM distance: > 3 FB   Neck ROM: full   Mouth opening: > 3 cm    Respiratory Devices and Support         Dental       (+) Minor Abnormalities - some fillings, tiny chips      Cardiovascular   cardiovascular exam normal       Rhythm and rate: regular and normal     Pulmonary   pulmonary exam normal        breath sounds clear to auscultation       OUTSIDE LABS:  CBC:   Lab Results   Component Value Date    WBC 8.8 11/29/2023    WBC 6.4 08/30/2023    HGB 13.5 11/29/2023    HGB 14.7 08/30/2023    HCT 40.6 11/29/2023    HCT 44.3 08/30/2023     11/29/2023     08/30/2023     BMP:   Lab Results   Component Value Date     11/29/2023     08/30/2023    POTASSIUM 4.0 11/29/2023    POTASSIUM 4.2 08/30/2023    CHLORIDE 104 11/29/2023    CHLORIDE 104 08/30/2023    CO2 23 11/29/2023    CO2 23 08/30/2023    BUN 19.8 11/29/2023    BUN 15.6 08/30/2023    CR 1.01 (H) 11/29/2023    CR 0.91 08/30/2023    GLC 99 11/29/2023     (H) 08/30/2023     COAGS: No results found for: \"PTT\", \"INR\", \"FIBR\"  POC: No results found for: \"BGM\", \"HCG\", \"HCGS\"  HEPATIC:   Lab Results   Component Value Date    ALBUMIN 4.4 03/02/2023    PROTTOTAL 7.2 03/02/2023    ALT 21 03/02/2023    AST 28 03/02/2023    ALKPHOS 121 (H) 03/02/2023    BILITOTAL 0.5 03/02/2023     OTHER:   Lab Results   Component Value Date    DARY 10.0 11/29/2023    TSH 1.28 11/29/2023       Anesthesia Plan    ASA Status:  3    NPO Status:  NPO Appropriate    Anesthesia Type: General (surgeon requests spinal with block; pt has MS).     - Airway: ETT   Induction: Intravenous.   Maintenance: Balanced.        Consents    Anesthesia Plan(s) and associated risks, benefits, and realistic alternatives discussed. " Questions answered and patient/representative(s) expressed understanding.     - Discussed: Risks, Benefits and Alternatives for BOTH SEDATION and the PROCEDURE were discussed     - Discussed with:  Patient      - Extended Intubation/Ventilatory Support Discussed: No.      - Patient is DNR/DNI Status: No     Use of blood products discussed: No .     Postoperative Care    Pain management: Peripheral nerve block (Single Shot).        Comments:    Other Comments: Reviewed 4/22/24 HP [place]    Pt will likely need general anesthesia due to MS, spinal relatively contraindicated.  Had general in 12/2023 with no issues, ketamine used, no fentanyl.  Did discuss with patient that we can use dilaudid.            JOSUE LOZOYA CRNA    I have reviewed the pertinent notes and labs in the chart from the past 30 days and (re)examined the patient.  Any updates or changes from those notes are reflected in this note.

## 2024-04-22 NOTE — TELEPHONE ENCOUNTER
Neto  Last Written Prescription Date: 8/9/23  Last Fill Quantity: 90 # of Refills: 2  Last Office Visit: 4/22/24    Theophylline  Last Written Prescription Date: 8/9/23  Last Fill Quantity: 90 # of Refills: 2  Last Office Visit: 4/22/24

## 2024-04-22 NOTE — PATIENT INSTRUCTIONS
Preparing for Your Surgery  Getting started  A nurse will call you to review your health history and instructions. They will give you an arrival time based on your scheduled surgery time. Please be ready to share:  Your doctor's clinic name and phone number  Your medical, surgical, and anesthesia history  A list of allergies and sensitivities  A list of medicines, including herbal treatments and over-the-counter drugs  Whether the patient has a legal guardian (ask how to send us the papers in advance)  Please tell us if you're pregnant--or if there's any chance you might be pregnant. Some surgeries may injure a fetus (unborn baby), so they require a pregnancy test. Surgeries that are safe for a fetus don't always need a test, and you can choose whether to have one.   If you have a child who's having surgery, please ask for a copy of Preparing for Your Child's Surgery.    Preparing for surgery  Within 10 to 30 days of surgery: Have a pre-op exam (sometimes called an H&P, or History and Physical). This can be done at a clinic or pre-operative center.  If you're having a , you may not need this exam. Talk to your care team.  At your pre-op exam, talk to your care team about all medicines you take. If you need to stop any medicines before surgery, ask when to start taking them again.  We do this for your safety. Many medicines can make you bleed too much during surgery. Some change how well surgery (anesthesia) drugs work.  Call your insurance company to let them know you're having surgery. (If you don't have insurance, call [phone].)  Call your clinic if there's any change in your health. This includes signs of a cold or flu (sore throat, runny nose, cough, rash, fever). It also includes a scrape or scratch near the surgery site.  If you have questions on the day of surgery, call your hospital or surgery center.  Eating and drinking guidelines  For your safety: Unless your surgeon tells you otherwise,  follow the guidelines below.  Eat and drink as usual until 8 hours before you arrive for surgery. After that, no food or milk.  Drink clear liquids until 2 hours before you arrive. These are liquids you can see through, like water, Gatorade, and Propel Water. They also include plain black coffee and tea (no cream or milk), candy, and breath mints. You can spit out gum when you arrive.  If you drink alcohol: Stop drinking it the night before surgery.  If your care team tells you to take medicine on the morning of surgery, it's okay to take it with a sip of water.  Preventing infection  Shower or bathe the night before and morning of your surgery. Follow the instructions your clinic gave you. (If no instructions, use regular soap.)  Don't shave or clip hair near your surgery site. We'll remove the hair if needed.  Don't smoke or vape the morning of surgery. You may chew nicotine gum up to 2 hours before surgery. A nicotine patch is okay.  Note: Some surgeries require you to completely quit smoking and nicotine. Check with your surgeon.  Your care team will make every effort to keep you safe from infection. We will:  Clean our hands often with soap and water (or an alcohol-based hand rub).  Clean the skin at your surgery site with a special soap that kills germs.  Give you a special gown to keep you warm. (Cold raises the risk of infection.)  Wear special hair covers, masks, gowns and gloves during surgery.  Give antibiotic medicine, if prescribed. Not all surgeries need antibiotics.  What to bring on the day of surgery  Photo ID and insurance card  Copy of your health care directive, if you have one  Glasses and hearing aids (bring cases)  You can't wear contacts during surgery  Inhaler and eye drops, if you use them (tell us about these when you arrive)  CPAP machine or breathing device, if you use them  A few personal items, if spending the night  If you have . . .  A pacemaker, ICD (cardiac defibrillator) or other  implant: Bring the ID card.  An implanted stimulator: Bring the remote control.  A legal guardian: Bring a copy of the certified (court-stamped) guardianship papers.  Please remove any jewelry, including body piercings. Leave jewelry and other valuables at home.  If you're going home the day of surgery  You must have a responsible adult drive you home. They should stay with you overnight as well.  If you don't have someone to stay with you, and you aren't safe to go home alone, we may keep you overnight. Insurance often won't pay for this.  After surgery  If it's hard to control your pain or you need more pain medicine, please call your surgeon's office.  Questions?   If you have any questions for your care team, list them here: _________________________________________________________________________________________________________________________________________________________________________ ____________________________________ ____________________________________ ____________________________________  For informational purposes only. Not to replace the advice of your health care provider. Copyright   2003, 2019 [facility]. All rights reserved. Clinically reviewed by Rossi Beverly MD. [employer] 905931 - REV 12/22.    How to Take Your Medication Before Surgery  - Take all of your medications before surgery except as noted below  - HOLD (do not take) Aspirin for 7 days  - HOLD (do not take) Theophylline the night prior  - STOP taking all vitamins and herbal supplements 14 days before surgery.      Will notify of lab results.  Then will consider non statin medication for lipids - Zetia 10 mg.  Restart Trazodone 50 mg nightly as needed for insomnia.  Would suggest waiting to start until after surgery.  EKG completed.  Thyroid ultrasound ordered.

## 2024-04-22 NOTE — TELEPHONE ENCOUNTER
Ramipril protocol failed due to the following:  Has GFR on file in past 12 months and most recent value is normal      Normal serum creatinine on file in past 12 months        Recent Labs   Lab Test 04/22/24  1500   CR 1.12*

## 2024-04-23 ENCOUNTER — HOSPITAL ENCOUNTER (OUTPATIENT)
Dept: ULTRASOUND IMAGING | Facility: HOSPITAL | Age: 64
Discharge: HOME | End: 2024-04-23
Attending: FAMILY MEDICINE | Admitting: FAMILY MEDICINE
Payer: COMMERCIAL

## 2024-04-23 DIAGNOSIS — E04.1 THYROID NODULE: ICD-10-CM

## 2024-04-23 LAB
ALBUMIN UR-MCNC: 50 MG/DL
APPEARANCE UR: ABNORMAL
ATRIAL RATE - MUSE: 87 BPM
BILIRUB UR QL STRIP: NEGATIVE
COLOR UR AUTO: YELLOW
DIASTOLIC BLOOD PRESSURE - MUSE: NORMAL MMHG
GLUCOSE UR STRIP-MCNC: NEGATIVE MG/DL
HGB UR QL STRIP: NEGATIVE
HYALINE CASTS: 25 /LPF
INTERPRETATION ECG - MUSE: NORMAL
KETONES UR STRIP-MCNC: ABNORMAL MG/DL
LEUKOCYTE ESTERASE UR QL STRIP: ABNORMAL
MUCOUS THREADS #/AREA URNS LPF: PRESENT /LPF
NITRATE UR QL: NEGATIVE
P AXIS - MUSE: 20 DEGREES
PH UR STRIP: 5.5 [PH] (ref 4.7–8)
PR INTERVAL - MUSE: 190 MS
QRS DURATION - MUSE: 136 MS
QT - MUSE: 422 MS
QTC - MUSE: 507 MS
R AXIS - MUSE: -25 DEGREES
RBC URINE: 1 /HPF
SP GR UR STRIP: 1.03 (ref 1–1.03)
SQUAMOUS EPITHELIAL: 3 /HPF
SYSTOLIC BLOOD PRESSURE - MUSE: NORMAL MMHG
T AXIS - MUSE: -2 DEGREES
UROBILINOGEN UR STRIP-MCNC: 2 MG/DL
VENTRICULAR RATE- MUSE: 87 BPM
WBC URINE: 4 /HPF

## 2024-04-23 PROCEDURE — 81001 URINALYSIS AUTO W/SCOPE: CPT | Mod: ZL | Performed by: FAMILY MEDICINE

## 2024-04-23 PROCEDURE — 76536 US EXAM OF HEAD AND NECK: CPT

## 2024-04-23 PROCEDURE — 87086 URINE CULTURE/COLONY COUNT: CPT | Mod: ZL,GZ | Performed by: FAMILY MEDICINE

## 2024-04-23 ASSESSMENT — LIFESTYLE VARIABLES: TOBACCO_USE: 1

## 2024-04-24 ENCOUNTER — TELEPHONE (OUTPATIENT)
Dept: FAMILY MEDICINE | Facility: OTHER | Age: 64
End: 2024-04-24

## 2024-04-24 DIAGNOSIS — E04.1 THYROID NODULE: Primary | ICD-10-CM

## 2024-04-24 LAB — BACTERIA UR CULT: NORMAL

## 2024-04-24 NOTE — OR NURSING
Email sent to total joint replacement team as follows;    Rosa Alcocer : 56  MRN [id number]    She is scheduled  for RTHA with Dr. Jose, PCP Dr. Masterson.  Rosa did receive the total joint replacement education packet and will perform the two surgical showers as instructed with hibiclens soap provided.  She plans on going home the following morning with assistance from her sister.  Rosa has two story home with four steps to enter home.  She doesn't use the upper floor.  She has tub/shower combo with grab bars, shower chair and handheld sprayer.  She has a cane and walker.    Reviewed the following topics with Rosa;   Call don't Fall , visitor policy, incentive spirometer, possible constipation with pain medication use, Active Ice system, Wound and dressing care and signs & symptoms of infection to watch for and report right away.  Rosa verbalized good understanding and states some narcotic pain medications make her sick.  She doesn't want Oxycodone and has used Methocarbamol in the past with success.

## 2024-04-24 NOTE — TELEPHONE ENCOUNTER
I return her call regarding her urine results. She is going to be having hip surgery on Monday and is not sure if she will be able to make it back to the clinic in 1-2 weeks for a urine recheck. Okay to have her wait until she is more mobile to come back in?

## 2024-04-24 NOTE — TELEPHONE ENCOUNTER
----- Message from Nessa Ribera RN sent at 4/24/2024 11:32 AM CDT -----  Regarding: thyroid biopsy  We received the order. Could you please change order > A side is needed . Zhanna Segura RN

## 2024-04-24 NOTE — TELEPHONE ENCOUNTER
Results requested: thyroid and urine results     Best number to reach patient at: [phone]     Best time to call patient: Anytime - okay to leave a message with results    Send to care team in basket.

## 2024-04-24 NOTE — TELEPHONE ENCOUNTER
----- Message from Salma Masterson MD sent at 4/23/2024  8:05 PM CDT -----  Urine also had some abnormalities - with protein/casts - can be signs of dehydration, kidney issues, etc.  Would like her to push fluids and repeat urine lab in 1-2 weeks.

## 2024-04-29 ENCOUNTER — APPOINTMENT (OUTPATIENT)
Dept: ULTRASOUND IMAGING | Facility: HOSPITAL | Age: 64
End: 2024-04-29
Attending: NURSE ANESTHETIST, CERTIFIED REGISTERED
Payer: COMMERCIAL

## 2024-04-29 ENCOUNTER — APPOINTMENT (OUTPATIENT)
Dept: GENERAL RADIOLOGY | Facility: HOSPITAL | Age: 64
End: 2024-04-29
Attending: ORTHOPAEDIC SURGERY
Payer: COMMERCIAL

## 2024-04-29 ENCOUNTER — HOSPITAL ENCOUNTER (OUTPATIENT)
Facility: HOSPITAL | Age: 64
LOS: 1 days | Discharge: HOME | End: 2024-04-30
Attending: ORTHOPAEDIC SURGERY | Admitting: ORTHOPAEDIC SURGERY
Payer: COMMERCIAL

## 2024-04-29 ENCOUNTER — ANESTHESIA (OUTPATIENT)
Dept: SURGERY | Facility: HOSPITAL | Age: 64
End: 2024-04-29
Payer: COMMERCIAL

## 2024-04-29 ENCOUNTER — APPOINTMENT (OUTPATIENT)
Dept: GENERAL RADIOLOGY | Facility: HOSPITAL | Age: 64
End: 2024-04-29
Payer: COMMERCIAL

## 2024-04-29 DIAGNOSIS — Z96.641 STATUS POST TOTAL REPLACEMENT OF RIGHT HIP: Primary | ICD-10-CM

## 2024-04-29 PROBLEM — E66.812 CLASS 2 OBESITY DUE TO EXCESS CALORIES WITHOUT SERIOUS COMORBIDITY WITH BODY MASS INDEX (BMI) OF 36.0 TO 36.9 IN ADULT: Status: ACTIVE | Noted: 2022-10-13

## 2024-04-29 PROBLEM — E66.09 CLASS 2 OBESITY DUE TO EXCESS CALORIES WITHOUT SERIOUS COMORBIDITY WITH BODY MASS INDEX (BMI) OF 36.0 TO 36.9 IN ADULT: Status: ACTIVE | Noted: 2022-10-13

## 2024-04-29 PROBLEM — I10 ESSENTIAL HYPERTENSION: Chronic | Status: ACTIVE | Noted: 2022-10-13

## 2024-04-29 PROBLEM — G35 MS (MULTIPLE SCLEROSIS) (H): Status: ACTIVE | Noted: 2022-10-13

## 2024-04-29 LAB — GLUCOSE BLDC GLUCOMTR-MCNC: 105 MG/DL (ref 70–99)

## 2024-04-29 PROCEDURE — 250N000011 HC RX IP 250 OP 636: Performed by: NURSE ANESTHETIST, CERTIFIED REGISTERED

## 2024-04-29 PROCEDURE — 250N000011 HC RX IP 250 OP 636: Performed by: ORTHOPAEDIC SURGERY

## 2024-04-29 PROCEDURE — 64450 NJX AA&/STRD OTHER PN/BRANCH: CPT | Mod: XU | Performed by: NURSE ANESTHETIST, CERTIFIED REGISTERED

## 2024-04-29 PROCEDURE — 370N000017 HC ANESTHESIA TECHNICAL FEE, PER MIN: Performed by: ORTHOPAEDIC SURGERY

## 2024-04-29 PROCEDURE — 272N000001 HC OR GENERAL SUPPLY STERILE: Performed by: ORTHOPAEDIC SURGERY

## 2024-04-29 PROCEDURE — 999N000157 HC STATISTIC RCP TIME EA 10 MIN

## 2024-04-29 PROCEDURE — 250N000011 HC RX IP 250 OP 636: Mod: JZ | Performed by: NURSE ANESTHETIST, CERTIFIED REGISTERED

## 2024-04-29 PROCEDURE — 999N000179 XR SURGERY CARM FLUORO LESS THAN 5 MIN W STILLS: Mod: TC

## 2024-04-29 PROCEDURE — 250N000009 HC RX 250: Performed by: NURSE ANESTHETIST, CERTIFIED REGISTERED

## 2024-04-29 PROCEDURE — 250N000025 HC SEVOFLURANE, PER MIN: Performed by: ORTHOPAEDIC SURGERY

## 2024-04-29 PROCEDURE — 999N000065 XR PELVIS PORT 1/2 VIEWS

## 2024-04-29 PROCEDURE — 710N000010 HC RECOVERY PHASE 1, LEVEL 2, PER MIN: Performed by: ORTHOPAEDIC SURGERY

## 2024-04-29 PROCEDURE — 64447 NJX AA&/STRD FEMORAL NRV IMG: CPT | Mod: XU | Performed by: NURSE ANESTHETIST, CERTIFIED REGISTERED

## 2024-04-29 PROCEDURE — 94640 AIRWAY INHALATION TREATMENT: CPT

## 2024-04-29 PROCEDURE — 27130 TOTAL HIP ARTHROPLASTY: CPT | Mod: RT | Performed by: ORTHOPAEDIC SURGERY

## 2024-04-29 PROCEDURE — 258N000003 HC RX IP 258 OP 636: Performed by: NURSE ANESTHETIST, CERTIFIED REGISTERED

## 2024-04-29 PROCEDURE — C1776 JOINT DEVICE (IMPLANTABLE): HCPCS | Performed by: ORTHOPAEDIC SURGERY

## 2024-04-29 PROCEDURE — 99222 1ST HOSP IP/OBS MODERATE 55: CPT | Performed by: NURSE PRACTITIONER

## 2024-04-29 PROCEDURE — 250N000011 HC RX IP 250 OP 636

## 2024-04-29 PROCEDURE — 258N000003 HC RX IP 258 OP 636

## 2024-04-29 PROCEDURE — 27130 TOTAL HIP ARTHROPLASTY: CPT | Performed by: NURSE ANESTHETIST, CERTIFIED REGISTERED

## 2024-04-29 PROCEDURE — 360N000084 HC SURGERY LEVEL 4 W/ FLUORO, PER MIN: Performed by: ORTHOPAEDIC SURGERY

## 2024-04-29 PROCEDURE — 250N000013 HC RX MED GY IP 250 OP 250 PS 637

## 2024-04-29 PROCEDURE — 250N000009 HC RX 250

## 2024-04-29 PROCEDURE — C1713 ANCHOR/SCREW BN/BN,TIS/BN: HCPCS | Performed by: ORTHOPAEDIC SURGERY

## 2024-04-29 PROCEDURE — 999N000141 HC STATISTIC PRE-PROCEDURE NURSING ASSESSMENT: Performed by: ORTHOPAEDIC SURGERY

## 2024-04-29 DEVICE — IMPLANTABLE DEVICE: Type: IMPLANTABLE DEVICE | Site: HIP | Status: FUNCTIONAL

## 2024-04-29 DEVICE — IMP HEAD FEMORAL SNR OXINIUM 12/14 36MM +0 71343600: Type: IMPLANTABLE DEVICE | Site: HIP | Status: FUNCTIONAL

## 2024-04-29 DEVICE — IMP HOLE COVER SNN ACETAB CUP REFLEC INTERFIT 71336500: Type: IMPLANTABLE DEVICE | Site: HIP | Status: FUNCTIONAL

## 2024-04-29 DEVICE — IMP SHELL SNR ACET R3 3H 52MM 71335552: Type: IMPLANTABLE DEVICE | Site: HIP | Status: FUNCTIONAL

## 2024-04-29 DEVICE — IMP SCR ACET SNN SPHERICAL HEAD 6.5X25MM 71332525: Type: IMPLANTABLE DEVICE | Site: HIP | Status: FUNCTIONAL

## 2024-04-29 DEVICE — IMP LINER SNR ACET R3 XLPE 0DEG 36X52MM 71332752: Type: IMPLANTABLE DEVICE | Site: HIP | Status: FUNCTIONAL

## 2024-04-29 RX ORDER — SODIUM CHLORIDE, SODIUM LACTATE, POTASSIUM CHLORIDE, CALCIUM CHLORIDE 600; 310; 30; 20 MG/100ML; MG/100ML; MG/100ML; MG/100ML
INJECTION, SOLUTION INTRAVENOUS CONTINUOUS
Status: DISCONTINUED | OUTPATIENT
Start: 2024-04-29 | End: 2024-04-30 | Stop reason: HOSPADM

## 2024-04-29 RX ORDER — TRAMADOL HYDROCHLORIDE 50 MG/1
50 TABLET ORAL EVERY 6 HOURS PRN
Status: DISCONTINUED | OUTPATIENT
Start: 2024-04-29 | End: 2024-04-30 | Stop reason: HOSPADM

## 2024-04-29 RX ORDER — CETIRIZINE HYDROCHLORIDE 10 MG/1
10 TABLET ORAL DAILY PRN
Status: DISCONTINUED | OUTPATIENT
Start: 2024-04-29 | End: 2024-04-30 | Stop reason: HOSPADM

## 2024-04-29 RX ORDER — NALOXONE HYDROCHLORIDE 0.4 MG/ML
0.4 INJECTION, SOLUTION INTRAMUSCULAR; INTRAVENOUS; SUBCUTANEOUS
Status: DISCONTINUED | OUTPATIENT
Start: 2024-04-29 | End: 2024-04-30 | Stop reason: HOSPADM

## 2024-04-29 RX ORDER — CEFAZOLIN SODIUM/WATER 2 G/20 ML
2 SYRINGE (ML) INTRAVENOUS SEE ADMIN INSTRUCTIONS
Status: DISCONTINUED | OUTPATIENT
Start: 2024-04-29 | End: 2024-04-29 | Stop reason: HOSPADM

## 2024-04-29 RX ORDER — ONDANSETRON 4 MG/1
4 TABLET, ORALLY DISINTEGRATING ORAL EVERY 6 HOURS PRN
Status: DISCONTINUED | OUTPATIENT
Start: 2024-04-29 | End: 2024-04-30 | Stop reason: HOSPADM

## 2024-04-29 RX ORDER — POLYETHYLENE GLYCOL 3350 17 G/17G
17 POWDER, FOR SOLUTION ORAL DAILY
Status: DISCONTINUED | OUTPATIENT
Start: 2024-04-30 | End: 2024-04-30 | Stop reason: HOSPADM

## 2024-04-29 RX ORDER — MOMETASONE FUROATE MONOHYDRATE 50 UG/1
2 SPRAY, METERED NASAL DAILY
Status: DISCONTINUED | OUTPATIENT
Start: 2024-04-29 | End: 2024-04-30 | Stop reason: HOSPADM

## 2024-04-29 RX ORDER — HYDROMORPHONE HYDROCHLORIDE 1 MG/ML
0.2 INJECTION, SOLUTION INTRAMUSCULAR; INTRAVENOUS; SUBCUTANEOUS EVERY 5 MIN PRN
Status: DISCONTINUED | OUTPATIENT
Start: 2024-04-29 | End: 2024-04-29

## 2024-04-29 RX ORDER — ASPIRIN 81 MG/1
81 TABLET ORAL 2 TIMES DAILY
Status: DISCONTINUED | OUTPATIENT
Start: 2024-04-30 | End: 2024-04-30 | Stop reason: HOSPADM

## 2024-04-29 RX ORDER — HYDROMORPHONE HYDROCHLORIDE 1 MG/ML
0.4 INJECTION, SOLUTION INTRAMUSCULAR; INTRAVENOUS; SUBCUTANEOUS EVERY 5 MIN PRN
Status: DISCONTINUED | OUTPATIENT
Start: 2024-04-29 | End: 2024-04-29

## 2024-04-29 RX ORDER — AMOXICILLIN 250 MG
1 CAPSULE ORAL 2 TIMES DAILY
Status: DISCONTINUED | OUTPATIENT
Start: 2024-04-29 | End: 2024-04-30 | Stop reason: HOSPADM

## 2024-04-29 RX ORDER — ONDANSETRON 4 MG/1
4 TABLET, ORALLY DISINTEGRATING ORAL EVERY 30 MIN PRN
Status: DISCONTINUED | OUTPATIENT
Start: 2024-04-29 | End: 2024-04-29

## 2024-04-29 RX ORDER — LIDOCAINE 40 MG/G
CREAM TOPICAL
Status: DISCONTINUED | OUTPATIENT
Start: 2024-04-29 | End: 2024-04-29 | Stop reason: HOSPADM

## 2024-04-29 RX ORDER — ONDANSETRON 2 MG/ML
4 INJECTION INTRAMUSCULAR; INTRAVENOUS EVERY 6 HOURS PRN
Status: DISCONTINUED | OUTPATIENT
Start: 2024-04-29 | End: 2024-04-30 | Stop reason: HOSPADM

## 2024-04-29 RX ORDER — NALOXONE HYDROCHLORIDE 0.4 MG/ML
0.2 INJECTION, SOLUTION INTRAMUSCULAR; INTRAVENOUS; SUBCUTANEOUS
Status: DISCONTINUED | OUTPATIENT
Start: 2024-04-29 | End: 2024-04-30 | Stop reason: HOSPADM

## 2024-04-29 RX ORDER — ACETAMINOPHEN 325 MG/1
650 TABLET ORAL EVERY 4 HOURS PRN
Status: DISCONTINUED | OUTPATIENT
Start: 2024-05-02 | End: 2024-04-30 | Stop reason: HOSPADM

## 2024-04-29 RX ORDER — HYDROCODONE BITARTRATE AND ACETAMINOPHEN 5; 325 MG/1; MG/1
1-2 TABLET ORAL EVERY 4 HOURS PRN
Qty: 45 TABLET | Refills: 0 | Status: SHIPPED | OUTPATIENT
Start: 2024-04-29 | End: 2024-08-12

## 2024-04-29 RX ORDER — BUPIVACAINE HYDROCHLORIDE 2.5 MG/ML
INJECTION, SOLUTION EPIDURAL; INFILTRATION; INTRACAUDAL
Status: COMPLETED | OUTPATIENT
Start: 2024-04-29 | End: 2024-04-29

## 2024-04-29 RX ORDER — SODIUM CHLORIDE, SODIUM LACTATE, POTASSIUM CHLORIDE, CALCIUM CHLORIDE 600; 310; 30; 20 MG/100ML; MG/100ML; MG/100ML; MG/100ML
INJECTION, SOLUTION INTRAVENOUS CONTINUOUS
Status: DISCONTINUED | OUTPATIENT
Start: 2024-04-29 | End: 2024-04-29

## 2024-04-29 RX ORDER — BUPIVACAINE HYDROCHLORIDE 2.5 MG/ML
INJECTION, SOLUTION EPIDURAL; INFILTRATION; INTRACAUDAL
Status: DISCONTINUED
Start: 2024-04-29 | End: 2024-04-29 | Stop reason: HOSPADM

## 2024-04-29 RX ORDER — LISINOPRIL 40 MG/1
40 TABLET ORAL DAILY
Status: DISCONTINUED | OUTPATIENT
Start: 2024-04-30 | End: 2024-04-30 | Stop reason: HOSPADM

## 2024-04-29 RX ORDER — ACETAMINOPHEN 325 MG/1
975 TABLET ORAL EVERY 8 HOURS
Qty: 27 TABLET | Refills: 0 | Status: DISCONTINUED | OUTPATIENT
Start: 2024-04-29 | End: 2024-04-30 | Stop reason: HOSPADM

## 2024-04-29 RX ORDER — SODIUM CHLORIDE, SODIUM LACTATE, POTASSIUM CHLORIDE, CALCIUM CHLORIDE 600; 310; 30; 20 MG/100ML; MG/100ML; MG/100ML; MG/100ML
INJECTION, SOLUTION INTRAVENOUS CONTINUOUS
Status: DISCONTINUED | OUTPATIENT
Start: 2024-04-29 | End: 2024-04-29 | Stop reason: HOSPADM

## 2024-04-29 RX ORDER — HYDROMORPHONE HCL IN WATER/PF 6 MG/30 ML
0.4 PATIENT CONTROLLED ANALGESIA SYRINGE INTRAVENOUS
Status: DISCONTINUED | OUTPATIENT
Start: 2024-04-29 | End: 2024-04-30 | Stop reason: HOSPADM

## 2024-04-29 RX ORDER — ONDANSETRON 2 MG/ML
INJECTION INTRAMUSCULAR; INTRAVENOUS PRN
Status: DISCONTINUED | OUTPATIENT
Start: 2024-04-29 | End: 2024-04-29

## 2024-04-29 RX ORDER — LIDOCAINE 40 MG/G
CREAM TOPICAL
Status: DISCONTINUED | OUTPATIENT
Start: 2024-04-29 | End: 2024-04-30 | Stop reason: HOSPADM

## 2024-04-29 RX ORDER — ALBUTEROL SULFATE 90 UG/1
2 AEROSOL, METERED RESPIRATORY (INHALATION) EVERY 4 HOURS PRN
Status: DISCONTINUED | OUTPATIENT
Start: 2024-04-29 | End: 2024-04-30 | Stop reason: HOSPADM

## 2024-04-29 RX ORDER — BISACODYL 10 MG
10 SUPPOSITORY, RECTAL RECTAL DAILY PRN
Status: DISCONTINUED | OUTPATIENT
Start: 2024-04-29 | End: 2024-04-30 | Stop reason: HOSPADM

## 2024-04-29 RX ORDER — KETAMINE HYDROCHLORIDE 10 MG/ML
INJECTION INTRAMUSCULAR; INTRAVENOUS PRN
Status: DISCONTINUED | OUTPATIENT
Start: 2024-04-29 | End: 2024-04-29

## 2024-04-29 RX ORDER — ASPIRIN 81 MG/1
81 TABLET ORAL 2 TIMES DAILY
Qty: 60 TABLET | Refills: 0 | Status: SHIPPED | OUTPATIENT
Start: 2024-04-29 | End: 2024-05-31

## 2024-04-29 RX ORDER — NALOXONE HYDROCHLORIDE 0.4 MG/ML
0.1 INJECTION, SOLUTION INTRAMUSCULAR; INTRAVENOUS; SUBCUTANEOUS
Status: DISCONTINUED | OUTPATIENT
Start: 2024-04-29 | End: 2024-04-29

## 2024-04-29 RX ORDER — TRANEXAMIC ACID 10 MG/ML
1 INJECTION, SOLUTION INTRAVENOUS ONCE
Status: COMPLETED | OUTPATIENT
Start: 2024-04-29 | End: 2024-04-29

## 2024-04-29 RX ORDER — DEXAMETHASONE SODIUM PHOSPHATE 10 MG/ML
INJECTION, SOLUTION INTRAMUSCULAR; INTRAVENOUS
Status: DISCONTINUED
Start: 2024-04-29 | End: 2024-04-29 | Stop reason: HOSPADM

## 2024-04-29 RX ORDER — TRAZODONE HYDROCHLORIDE 50 MG/1
50 TABLET, FILM COATED ORAL AT BEDTIME
Status: DISCONTINUED | OUTPATIENT
Start: 2024-04-29 | End: 2024-04-29

## 2024-04-29 RX ORDER — ALBUTEROL SULFATE 0.83 MG/ML
2.5 SOLUTION RESPIRATORY (INHALATION) ONCE
Status: COMPLETED | OUTPATIENT
Start: 2024-04-29 | End: 2024-04-29

## 2024-04-29 RX ORDER — ONDANSETRON 2 MG/ML
4 INJECTION INTRAMUSCULAR; INTRAVENOUS EVERY 30 MIN PRN
Status: DISCONTINUED | OUTPATIENT
Start: 2024-04-29 | End: 2024-04-29

## 2024-04-29 RX ORDER — ACETAMINOPHEN 10 MG/ML
1000 INJECTION, SOLUTION INTRAVENOUS ONCE
Status: COMPLETED | OUTPATIENT
Start: 2024-04-29 | End: 2024-04-29

## 2024-04-29 RX ORDER — CEFAZOLIN SODIUM 2 G/100ML
2 INJECTION, SOLUTION INTRAVENOUS EVERY 8 HOURS
Qty: 200 ML | Refills: 0 | Status: COMPLETED | OUTPATIENT
Start: 2024-04-29 | End: 2024-04-30

## 2024-04-29 RX ORDER — LIDOCAINE HYDROCHLORIDE 20 MG/ML
INJECTION, SOLUTION INFILTRATION; PERINEURAL PRN
Status: DISCONTINUED | OUTPATIENT
Start: 2024-04-29 | End: 2024-04-29

## 2024-04-29 RX ORDER — PROPOFOL 10 MG/ML
INJECTION, EMULSION INTRAVENOUS PRN
Status: DISCONTINUED | OUTPATIENT
Start: 2024-04-29 | End: 2024-04-29

## 2024-04-29 RX ORDER — LANOLIN ALCOHOL/MO/W.PET/CERES
400 CREAM (GRAM) TOPICAL DAILY
Status: DISCONTINUED | OUTPATIENT
Start: 2024-04-29 | End: 2024-04-30 | Stop reason: HOSPADM

## 2024-04-29 RX ORDER — BUPIVACAINE HYDROCHLORIDE 2.5 MG/ML
INJECTION, SOLUTION INFILTRATION; PERINEURAL PRN
Status: DISCONTINUED | OUTPATIENT
Start: 2024-04-29 | End: 2024-04-29 | Stop reason: HOSPADM

## 2024-04-29 RX ORDER — CEFAZOLIN SODIUM/WATER 2 G/20 ML
2 SYRINGE (ML) INTRAVENOUS
Status: COMPLETED | OUTPATIENT
Start: 2024-04-29 | End: 2024-04-29

## 2024-04-29 RX ORDER — HYDROMORPHONE HCL IN WATER/PF 6 MG/30 ML
0.2 PATIENT CONTROLLED ANALGESIA SYRINGE INTRAVENOUS
Status: DISCONTINUED | OUTPATIENT
Start: 2024-04-29 | End: 2024-04-30 | Stop reason: HOSPADM

## 2024-04-29 RX ADMIN — Medication 10 MG: at 09:40

## 2024-04-29 RX ADMIN — ONDANSETRON 4 MG: 2 INJECTION INTRAMUSCULAR; INTRAVENOUS at 14:27

## 2024-04-29 RX ADMIN — PROPOFOL 40 MG: 10 INJECTION, EMULSION INTRAVENOUS at 09:27

## 2024-04-29 RX ADMIN — LIDOCAINE HYDROCHLORIDE 20 MG: 20 INJECTION, SOLUTION INFILTRATION; PERINEURAL at 09:24

## 2024-04-29 RX ADMIN — PROPOFOL 160 MG: 10 INJECTION, EMULSION INTRAVENOUS at 09:24

## 2024-04-29 RX ADMIN — HYDROMORPHONE HYDROCHLORIDE 0.25 MG: 1 INJECTION, SOLUTION INTRAMUSCULAR; INTRAVENOUS; SUBCUTANEOUS at 09:48

## 2024-04-29 RX ADMIN — ACETAMINOPHEN 975 MG: 325 TABLET, FILM COATED ORAL at 14:20

## 2024-04-29 RX ADMIN — CEFAZOLIN SODIUM 2 G: 2 INJECTION, SOLUTION INTRAVENOUS at 17:05

## 2024-04-29 RX ADMIN — TRANEXAMIC ACID 1 G: 10 INJECTION, SOLUTION INTRAVENOUS at 10:13

## 2024-04-29 RX ADMIN — BUPIVACAINE HYDROCHLORIDE 20 ML: 2.5 INJECTION, SOLUTION EPIDURAL; INFILTRATION; INTRACAUDAL at 09:00

## 2024-04-29 RX ADMIN — SODIUM CHLORIDE, POTASSIUM CHLORIDE, SODIUM LACTATE AND CALCIUM CHLORIDE: 600; 310; 30; 20 INJECTION, SOLUTION INTRAVENOUS at 08:05

## 2024-04-29 RX ADMIN — ALBUTEROL SULFATE 2.5 MG: 2.5 SOLUTION RESPIRATORY (INHALATION) at 08:13

## 2024-04-29 RX ADMIN — SENNOSIDES AND DOCUSATE SODIUM 1 TABLET: 8.6; 5 TABLET ORAL at 20:39

## 2024-04-29 RX ADMIN — TRANEXAMIC ACID 1 G: 10 INJECTION, SOLUTION INTRAVENOUS at 09:28

## 2024-04-29 RX ADMIN — HYDROMORPHONE HYDROCHLORIDE 0.4 MG: 1 INJECTION, SOLUTION INTRAMUSCULAR; INTRAVENOUS; SUBCUTANEOUS at 11:16

## 2024-04-29 RX ADMIN — SENNOSIDES AND DOCUSATE SODIUM 1 TABLET: 8.6; 5 TABLET ORAL at 14:20

## 2024-04-29 RX ADMIN — ACETAMINOPHEN 975 MG: 325 TABLET, FILM COATED ORAL at 23:17

## 2024-04-29 RX ADMIN — ROCURONIUM BROMIDE 50 MG: 10 INJECTION INTRAVENOUS at 09:25

## 2024-04-29 RX ADMIN — SUGAMMADEX 200 MG: 100 INJECTION, SOLUTION INTRAVENOUS at 10:30

## 2024-04-29 RX ADMIN — Medication 2 G: at 09:06

## 2024-04-29 RX ADMIN — MIDAZOLAM 2 MG: 1 INJECTION INTRAMUSCULAR; INTRAVENOUS at 09:16

## 2024-04-29 RX ADMIN — Medication 10 MG: at 09:36

## 2024-04-29 RX ADMIN — BUPIVACAINE HYDROCHLORIDE 5 ML: 2.5 INJECTION, SOLUTION EPIDURAL; INFILTRATION; INTRACAUDAL at 09:10

## 2024-04-29 RX ADMIN — HYDROMORPHONE HYDROCHLORIDE 0.5 MG: 1 INJECTION, SOLUTION INTRAMUSCULAR; INTRAVENOUS; SUBCUTANEOUS at 09:55

## 2024-04-29 RX ADMIN — Medication 20 MG: at 09:49

## 2024-04-29 RX ADMIN — TRAMADOL HYDROCHLORIDE 50 MG: 50 TABLET, COATED ORAL at 18:33

## 2024-04-29 RX ADMIN — SODIUM CHLORIDE, POTASSIUM CHLORIDE, SODIUM LACTATE AND CALCIUM CHLORIDE: 600; 310; 30; 20 INJECTION, SOLUTION INTRAVENOUS at 14:19

## 2024-04-29 RX ADMIN — HYDROMORPHONE HYDROCHLORIDE 0.25 MG: 1 INJECTION, SOLUTION INTRAMUSCULAR; INTRAVENOUS; SUBCUTANEOUS at 09:30

## 2024-04-29 RX ADMIN — ACETAMINOPHEN 1000 MG: 10 INJECTION INTRAVENOUS at 08:42

## 2024-04-29 RX ADMIN — ONDANSETRON 4 MG: 2 INJECTION INTRAMUSCULAR; INTRAVENOUS at 09:36

## 2024-04-29 RX ADMIN — HYDROMORPHONE HYDROCHLORIDE 0.4 MG: 0.2 INJECTION, SOLUTION INTRAMUSCULAR; INTRAVENOUS; SUBCUTANEOUS at 12:24

## 2024-04-29 RX ADMIN — ONDANSETRON 4 MG: 2 INJECTION INTRAMUSCULAR; INTRAVENOUS at 20:39

## 2024-04-29 RX ADMIN — THEOPHYLLINE ANHYDROUS 400 MG: 100 CAPSULE, EXTENDED RELEASE ORAL at 14:20

## 2024-04-29 ASSESSMENT — ACTIVITIES OF DAILY LIVING (ADL)
ADLS_ACUITY_SCORE: 23
ADLS_ACUITY_SCORE: 29
ADLS_ACUITY_SCORE: 23
ADLS_ACUITY_SCORE: 29
ADLS_ACUITY_SCORE: 23
ADLS_ACUITY_SCORE: 29
ADLS_ACUITY_SCORE: 23
ADLS_ACUITY_SCORE: 29
ADLS_ACUITY_SCORE: 23

## 2024-04-29 NOTE — PLAN OF CARE
[facility] Inpatient Admission Note:    Patient admitted to 3212/3212-1 at approximately 1210 via bed accompanied by nurse from surgery . Report received from LEATHA Figueroa in SBAR format at 1210 via face to face in room.  Patient is alert and oriented X 3, reports pain; rates at 9 on 0-10 scale.  Patient oriented to room, unit, hourly rounding, and plan of care. Explained admission packet and patient handbook with patient bill of rights brochure. Will continue to monitor and document as needed.     Inpatient Nursing criteria listed below was met:    Health care directives status obtained and documented: Yes    Clergy visit ordered if patient requests: N/A    Skin issues/needs documented: Yes    Isolation Patient: no Education given, correct sign in place and documentation row added to PCS:  No    Fall Prevention Yes: Care plan updated, education given and documented, sticker and magnet in place: Yes    Care Plan initiated: Yes    Education Documented (including assessment): Yes    Patient has discharge needs : Yes If yes, please explain:PT/OT

## 2024-04-29 NOTE — ANESTHESIA PROCEDURE NOTES
Airway       Patient location during procedure: OR       Procedure Start/Stop Times: 4/29/2024 9:26 AM  Staff -        CRNA: Mahendra Gann APRN CRNA       Performed By: CRNAIndications and Patient Condition       Indications for airway management: jayashree-procedural       Induction type:intravenous       Mask difficulty assessment: 2 - vent by mask + OA or adjuvant +/- NMBA    Final Airway Details       Final airway type: endotracheal airway       Successful airway: ETT - single  Endotracheal Airway Details        ETT size (mm): 7.0       Cuffed: yes       Cuff volume (mL): 7       Successful intubation technique: direct laryngoscopy       DL Blade Type: MAC 3       Grade View of Cords: 1       Adjucts: stylet       Position: Right       Measured from: lips       Secured at (cm): 21       Bite block used: None    Post intubation assessment        Placement verified by: capnometry, equal breath sounds and chest rise        Number of attempts at approach: 1       Number of other approaches attempted: 0       Secured with: tape       Ease of procedure: easy       Dentition: Unchanged and Intact    Medication(s) Administered   Medication Administration Time: 4/29/2024 9:26 AM

## 2024-04-29 NOTE — ANESTHESIA POSTPROCEDURE EVALUATION
Patient: Rosa Alcocer    Procedure: Procedure(s):  Right Direct Anterior Total Hip Arthroplasty       Anesthesia Type:  General    Note:  Disposition: Inpatient   Postop Pain Control: Uneventful            Sign Out: Well controlled pain   PONV: No   Neuro/Psych: Uneventful            Sign Out: Acceptable/Baseline neuro status   Airway/Respiratory: Uneventful            Sign Out: Acceptable/Baseline resp. status   CV/Hemodynamics: Uneventful            Sign Out: Acceptable CV status; No obvious hypovolemia; No obvious fluid overload   Other NRE: NONE   DID A NON-ROUTINE EVENT OCCUR? No       Last vitals:  Vitals Value Taken Time   /81 04/29/24 1155   Temp 97.6  F (36.4  C) 04/29/24 1200   Pulse 63 04/29/24 1200   Resp 21 04/29/24 1200   SpO2 95 % 04/29/24 1200       Electronically Signed By: JOSUE Manning CRNA  April 29, 2024  1:02 PM

## 2024-04-29 NOTE — OP NOTE
Preoperative Diagnosis: Right Hip Osteoarthritis    Postoperative Diagnosis: Right Hip Osteoarthritis    Procedure: Right Direct Anterior Total Hip Arthroplasty    Surgeon: Kirill Jose MD    Assistants: Tanvi Novoa DNP    A skilled first assistant was required for patient positioning, retracting, and carrying out the procedure in a safe and effective manner    Anesthesia:   1) General   2) Local Injection around surgical site  3) peripheral nerve blocks    Findings:    1) Satisfactory DEEPTHI with near equal restoration of leg length and offset postoperatively    2) Adequate hemostasis     Implants:    1) [employer]hew Polar Femoral Stem Size 3 standard Offset   2) Size 52 mm R3 Acetabular Shell   3) Size 36 mm standard poly acetabular insert   4) Size 36 mm 0 Oxinium Femoral Head      Estimated Blood Loss: 250 ml    Specimens: None    Drains: None    Complications: None    Indications:   This is a 63year old patient with long standing right hip pain and severe osteoarthritis.  They have failed nonoperative treatment including use of NSAIDs; Tylenol; injections and exercise.  Given the continued symptoms they wished to proceed with a hip replacement.  The risks including pain, bleeding, infection, deep vein thrombosis, pulmonary embolism, myocardial infarction, component failure, need for revision operation was discussed with the patient.  The surgical consent was signed and surgical site was marked.  All questions regarding postoperative disposition were answered.      Description of Procedure:   The patient was taken to the operating room and placed under spinal anesthesia.  They were then moved to the [place] bed and positioned in standard fashion.  The C-arm was used to make a templating radiograph for post reconstruction comparison.  The Right hip was prepped with a Chloraprep wipe and sponge then draped in sterile fashion.  A timeout was called to identify the correct patient and surgical site.  An  anterolateral incision and direct anterior approach to the hip was utilized.  Care was taken to cauterize the circumflex vessels.  A capsulotomy was completed and tag sutures were placed.  The femoral neck was exposed and an osteotomy was completed.  A secondary cut was made to make removing the femoral head/neck easier.  The hip was externally rotated and the labrum excised.  Further release on the proximal femur was completed to improve visualization of the proximal femur later in the case.  The C-arm was used during reaming the acetabulum to check alignment and depth of reaming.  The acetabulum was reamed to a size 52 mm and the acetabular shell was placed.  A 6.5 mm bone screw 25 mm in length was placed along with a hole eliminator.  The final 36 mm poly insert was placed and checked for a secure fit.  The traction was released and standard capsular releases were completed on the proximal femur.  The leg was externally rotated 120 degrees and brought into adduction and extension.  Standard retractor placement was utilized.  The femur was prepared with a box osteotome, canal finder and broaching up to a size 3.  The trial implants were placed and the hip was reduced.  C-arm was used to compare to preoperative leg length and offset.  The final size 3 femoral stem was placed with a size 36 0 Oxinium femoral head and the hip was reduced.  Final C-arm images were used to confirm positioning.  No fractures were identified.  Pulse lavage and betadine irrigation was used.  The capsule was closed with #1 Vicryl sutures.  The tensor fascia was closed with a #1 Vicryl suture in running fashion.  The subcutaneous tissue was closed with a 2-0 Vicryl and staples. An Aquacel dressing was applied.  The patient was awakened and transferred to PACU in stable condition.  A post operative x-ray was taken in PACU.        Postoperative Plan:   Routine DEEPTHI protocol (Weightbearing as tolerated, No hip ROM precautions, PT, Pain control,  DVT prophylaxis with Aspirin).  Anticipate discharge in 1-2 days.  Follow-up in  2 weeks in [facility].  X-rays at follow-up AP and Cross Table Lateral Right Hip

## 2024-04-29 NOTE — CONSULTS
"[facility]    Hospitalist Progress Note    Date of Service (when I saw the patient): 04/29/2024    Assessment & Plan       S/P total right hip arthroplasty:Please see operative note. No immediate post-op complications.       Essential hypertension:Blood pressures have been stable, continue home meds      Moderate persistent asthma without complication:No signs of exacerbation ,no hypoxia, monitor      MS (multiple sclerosis) (H):In long standing remission      Class 2 obesity due to excess calories without serious comorbidity with body mass index (BMI) of 36.0 to 36.9 in adult              # Drug Induced Platelet Defect: home medication list includes an antiplatelet medication   # Hypertension: Noted on problem list      # Obesity: Estimated body mass index is 37.44 kg/m  as calculated from the following:    Height as of this encounter: 1.651 m (5' 5\").    Weight as of this encounter: 102.1 kg (225 lb).       # Asthma: noted on problem list        DVT Prophylaxis: Defer to primary service  Code Status: Full Code    Disposition: Expected discharge in 1 days once cleared by surgery/PT.    Monica Madera CNP    Subjective:  Sleeps unless aroused, denies chest pain, abdominal pain, nausea. Pain uncontrolled on arrival to the floor so IV dilaudid was given-she denies pain now.     -Data reviewed today: I reviewed all new labs and imaging results over the last 24 hours.     Physical Exam   Temp: 97.8  F (36.6  C) Temp src: Tympanic BP: 113/61 Pulse: 53   Resp: 16 SpO2: 95 % O2 Device: Nasal cannula Oxygen Delivery: 4 LPM  Vitals:    04/29/24 0725   Weight: 102.1 kg (225 lb)     Vital Signs with Ranges  Temp:  [97.1  F (36.2  C)-98.5  F (36.9  C)] 97.8  F (36.6  C)  Pulse:  [53-93] 53  Resp:  [10-21] 16  BP: (113-155)/(61-94) 113/61  SpO2:  [94 %-97 %] 95 %  No intake/output data recorded.    Peripheral IV 04/29/24 Right Hand (Active)   Site Assessment WDL 04/29/24 1200   Line Status Infusing 04/29/24 1155 "   Dressing Transparent 04/29/24 1200   Dressing Status clean;dry;intact 04/29/24 1200   Dressing Intervention New dressing  04/29/24 0805   Phlebitis Scale 0-->no symptoms 04/29/24 1200   Infiltration? no 04/29/24 1200   Number of days: 0       Incision/Surgical Site 12/13/23 Other (Comment) Abdomen (Active)   Number of days: 138       Incision/Surgical Site 04/29/24 Anterior;Right Hip (Active)   Incision Assessment UTV 04/29/24 1210   Dressing Other (Comment) 04/29/24 1156   Catherine-Incision Assessment UTV 04/29/24 1210   Closure Approximated;Sutures;Staples 04/29/24 1210   Incision Drainage Amount None 04/29/24 1210   Incision Care Ice applied 04/29/24 1156   Dressing Intervention Clean, dry, intact 04/29/24 1210   Number of days: 0     Line/device assessment(s) completed for medical necessity    Constitutional: Awake,alert, no acute distress  Respiratory: Clear bilaterally, no crackles, wheezes or rhonchi  Cardiovascular: HRR, no murmurs, rubs, thrills   GI: Soft,nontender, bowel sounds hypoactive  Skin/Integumen: No rashes, open areas or unusual bruising  Other:  Right hip bandage and ice in place, CMS intact distally    Medications   Current Facility-Administered Medications   Medication Dose Route Frequency Provider Last Rate Last Admin    lactated ringers infusion   Intravenous Continuous Tanvi Novoa APRN CNP         Current Facility-Administered Medications   Medication Dose Route Frequency Provider Last Rate Last Admin    acetaminophen (TYLENOL) tablet 975 mg  975 mg Oral Q8H Tanvi Novoa APRN CNP        [START ON 4/30/2024] aspirin EC tablet 81 mg  81 mg Oral BID Tanvi Novoa APRN CNP        ceFAZolin (ANCEF) 2 g in 100 mL D5W intermittent infusion  2 g Intravenous Q8H Tanvi Novoa APRN CNP        folic acid (FOLVITE) tablet 400 mcg  400 mcg Oral Daily Tanvi Novoa APRN CNP        [START ON 4/30/2024] lisinopril (ZESTRIL) tablet 40 mg  40 mg Oral Daily Tanvi Novoa APRN CNP         magnesium CAPS 1 tablet  1 tablet Oral Daily Tanvi Novoa APRN CNP        mometasone (NASONEX) spray 2 spray  2 spray Nasal Daily Tanvi Novoa APRN CNP        [START ON 4/30/2024] polyethylene glycol (MIRALAX) Packet 17 g  17 g Oral Daily Tanvi Novoa APRN CNP        potassium gluconate tablet 2.5 mEq  2.5 mEq Oral QAM Tanvi Novoa APRN CNP        senna-docusate (SENOKOT-S/PERICOLACE) 8.6-50 MG per tablet 1 tablet  1 tablet Oral BID Tanvi Novoa APRN CNP        sodium chloride (PF) 0.9% PF flush 3 mL  3 mL Intracatheter Q8H Tanvi Novoa APRN CNP        theophylline (LALITA-24) 24 hr capsule CP24 400 mg  400 mg Oral Daily Tanvi Novoa APRN CNP           Data   Recent Labs   Lab 04/29/24  0742 04/22/24  1500   WBC  --  6.4   HGB  --  12.6   MCV  --  81   PLT  --  305   INR  --  0.98   NA  --  140   POTASSIUM  --  4.0   CHLORIDE  --  105   CO2  --  25   BUN  --  18.9   CR  --  1.12*   ANIONGAP  --  10   DARY  --  9.7   * 105*   ALBUMIN  --  4.4   PROTTOTAL  --  7.2   BILITOTAL  --  0.3   ALKPHOS  --  144   ALT  --  23   AST  --  32       Recent Results (from the past 24 hour(s))   XR Surgery WILI Fluoro L/T 5 Min w Stills    Narrative    This exam was marked as non-reportable because it will not be read by a   radiologist or a [place] non-radiologist provider.         XR Pelvis Port 1/2 Views    Narrative    XR PELVIS PORT 1/2 VIEWS    HISTORY: Status post Hip surgery .    COMPARISON: 7/27/2023.    TECHNIQUE: AP pelvis.    FINDINGS:    Postoperative changes of interval right hip arthroplasty are seen. No  periprosthetic fracture is identified. Subcutaneous air is seen.      A SI fusion device projects over the right pelvis, also new when  compared to prior.    GRANT MARAVILLA MD         SYSTEM ID:  [id number]

## 2024-04-29 NOTE — ANESTHESIA CARE TRANSFER NOTE
Patient: Rosa Alcocer    Procedure: Procedure(s):  Right Direct Anterior Total Hip Arthroplasty       Diagnosis: Arthritis of right hip [M16.11]  Diagnosis Additional Information: No value filed.    Anesthesia Type:   General     Note:    Oropharynx: oropharynx clear of all foreign objects and spontaneously breathing  Level of Consciousness: drowsy  Oxygen Supplementation: face mask  Level of Supplemental Oxygen (L/min / FiO2): 8  Independent Airway: airway patency satisfactory and stable  Dentition: dentition unchanged  Vital Signs Stable: post-procedure vital signs reviewed and stable  Report to RN Given: handoff report given  Patient transferred to: PACU    Handoff Report: Identifed the Patient, Identified the Reponsible Provider, Reviewed the pertinent medical history, Discussed the surgical course, Reviewed Intra-OP anesthesia mangement and issues during anesthesia, Set expectations for post-procedure period and Allowed opportunity for questions and acknowledgement of understanding  Vitals:  Vitals Value Taken Time   /73 04/29/24 1100   Temp 97.1  F (36.2  C) 04/29/24 1055   Pulse 66 04/29/24 1105   Resp 12 04/29/24 1105   SpO2 97 % 04/29/24 1105   Vitals shown include unfiled device data.    Electronically Signed By: JOSUE Garza CRNA  April 29, 2024  11:06 AM

## 2024-04-29 NOTE — OR NURSING
PACU Respiratory Event Documentation     1) Episodes of Apnea greater than or equal to 10 seconds: none    2) Bradypnea - less than 8 breaths per minute: none    3) Pain score on 0 to 10 scale: 5/10    4) Pain-sedation mismatch (yes or no): no    5) Repeated 02 desaturation less than 90% (yes or no): no    Anesthesia notified? (yes or no): no    Any of the above events occuring repeatedly in separate 30 minute intervals may be considered recurrent PACU respiratory events.    Pt transferred to Room 3212 on bed with 2 staff.  Cane, walker, and belongings went up to room with pt.  Pt will report pain but falls asleep right away and is comfortable.  Pt's sister, Rossi, notified of room number.  Report given to LEATHA Lara.

## 2024-04-29 NOTE — ANESTHESIA PROCEDURE NOTES
PENG Procedure Note    Pre-Procedure   Staff -        CRNA: Mahendra Gann APRN CRNA       Other Anesthesia Staff: Wolf Black APRN CRNA       Performed By: CRNA       Procedure Start/Stop Times: 4/29/2024 9:00 AM and 4/29/2024 9:08 AM       Pre-Anesthestic Checklist: patient identified, IV checked, site marked, risks and benefits discussed, informed consent, monitors and equipment checked, pre-op evaluation, at physician/surgeon's request and post-op pain management  Timeout:       Correct Patient: Yes        Correct Procedure: Yes        Correct Site: Yes        Correct Position: Yes        Correct Laterality: Yes        Site Marked: Yes  Procedure Documentation  Procedure: PENG       Diagnosis: RIGHT DIRECT ANTERIOR TOTAL HIP       Laterality: right       Patient Position: supine       Patient Prep/Sterile Barriers: sterile gloves, mask       Skin prep: Chloraprep       Needle Type: insulated       Needle Gauge: 20.        Needle Length (Inches): 4        Ultrasound guided       1. Ultrasound was used to identify targeted nerve, plexus, vascular marker, or fascial plane and place a needle adjacent to it in real-time.       2. Ultrasound was used to visualize the spread of anesthetic in close proximity to the above referenced structure.       3. A permanent image is entered into the patient's record.       4. The visualized anatomic structures appeared normal.       5. There were no apparent abnormal pathologic findings.    Assessment/Narrative         The placement was negative for: blood aspirated, painful injection and site bleeding       Paresthesias: No.       Bolus given via needle. no blood aspirated via catheter.        Secured via.        Insertion/Infusion Method: Single Shot       Complications: none       Injection made incrementally with aspirations every 5 mL.    Medication(s) Administered   Bupivacaine 0.25% PF (Infiltration) - Infiltration   20 mL - 4/29/2024 9:00:00 AM  Medication  "Administration Time: 4/29/2024 9:00 AM      FOR [facility] ONLY:   Pain Team Contact information: please page the Pain Team Via Via[employer]. Search \"Pain\". During daytime hours, please page the attending first. At night please page the resident first.      "

## 2024-04-29 NOTE — INTERVAL H&P NOTE
"I have reviewed the surgical (or preoperative) H&P that is linked to this encounter, and examined the patient. There are no significant changes    Clinical Conditions Present on Arrival:  Clinically Significant Risk Factors Present on Admission                 # Drug Induced Platelet Defect: home medication list includes an antiplatelet medication  # Obesity: Estimated body mass index is 37.44 kg/m  as calculated from the following:    Height as of this encounter: 1.651 m (5' 5\").    Weight as of this encounter: 102.1 kg (225 lb).       "

## 2024-04-29 NOTE — ANESTHESIA PROCEDURE NOTES
Other (Lateral Femoral Cutaneous) Procedure Note    Pre-Procedure   Staff -        CRNA: Mahendra Gann APRN CRNA       Other Anesthesia Staff: Wolf Black APRN CRNA       Performed By: CRNA       Location: pre-op       Procedure Start/Stop Times: 4/29/2024 9:09 AM and 4/29/2024 9:12 AM       Pre-Anesthestic Checklist: patient identified, IV checked, site marked, risks and benefits discussed, informed consent, monitors and equipment checked, pre-op evaluation, at physician/surgeon's request and post-op pain management  Timeout:       Correct Patient: Yes        Correct Procedure: Yes        Correct Site: Yes        Correct Position: Yes        Correct Laterality: Yes        Site Marked: Yes  Procedure Documentation  Procedure: Other (Lateral Femoral Cutaneous)       Diagnosis: RIGHT TOTAL HIP       Laterality: right       Patient Position: supine       Patient Prep/Sterile Barriers: sterile gloves, mask, patient draped       Skin prep: Chloraprep       Needle Gauge: 20.        Needle Length (Inches): 4        Ultrasound guided       1. Ultrasound was used to identify targeted nerve, plexus, vascular marker, or fascial plane and place a needle adjacent to it in real-time.       2. Ultrasound was used to visualize the spread of anesthetic in close proximity to the above referenced structure.       3. A permanent image is entered into the patient's record.       4. The visualized anatomic structures appeared normal.       5. There were no apparent abnormal pathologic findings.    Assessment/Narrative         The placement was negative for: blood aspirated, painful injection and site bleeding       Paresthesias: No.       Bolus given via needle. no blood aspirated via catheter.        Secured via.        Insertion/Infusion Method: Single Shot       Complications: none       Injection made incrementally with aspirations every 5 mL.    Medication(s) Administered   Bupivacaine 0.25% PF (Infiltration) -  "Infiltration   5 mL - 4/29/2024 9:10:00 AM  Medication Administration Time: 4/29/2024 9:09 AM      FOR [facility] ([facility] ONLY:   Pain Team Contact information: please page the Pain Team Via [employer]. Search \"Pain\". During daytime hours, please page the attending first. At night please page the resident first.      "

## 2024-04-29 NOTE — PROGRESS NOTES
04/29/24 1400   Appointment Info   Signing Clinician's Name / Credentials (PT) Agustin Hernández DPT   Appointment Canceled Reason (PT) Patient declined;Unarousable   Appointment Cancel Comments (PT) Attempted to see pt for PT eval.  She was sleeping in bed upon approach and with mod verbal stimulus did rouse just enough to refuse PT noting she was far too tired.  Consulted with her RN whom stated pt's pain was poorly controlled upon arrival to the unit so did need to give her some Dilaudid.  Will attempt to see pt for PT eval tomorrow morning.

## 2024-04-30 ENCOUNTER — APPOINTMENT (OUTPATIENT)
Dept: PHYSICAL THERAPY | Facility: HOSPITAL | Age: 64
End: 2024-04-30
Payer: COMMERCIAL

## 2024-04-30 VITALS
WEIGHT: 225 LBS | TEMPERATURE: 97.4 F | OXYGEN SATURATION: 92 % | HEIGHT: 65 IN | SYSTOLIC BLOOD PRESSURE: 169 MMHG | RESPIRATION RATE: 18 BRPM | DIASTOLIC BLOOD PRESSURE: 85 MMHG | HEART RATE: 90 BPM | BODY MASS INDEX: 37.49 KG/M2

## 2024-04-30 LAB
HGB BLD-MCNC: 10.5 G/DL (ref 11.7–15.7)
HOLD SPECIMEN: NORMAL

## 2024-04-30 PROCEDURE — 85018 HEMOGLOBIN: CPT

## 2024-04-30 PROCEDURE — 97161 PT EVAL LOW COMPLEX 20 MIN: CPT | Mod: GP

## 2024-04-30 PROCEDURE — 250N000011 HC RX IP 250 OP 636

## 2024-04-30 PROCEDURE — 36415 COLL VENOUS BLD VENIPUNCTURE: CPT

## 2024-04-30 PROCEDURE — 250N000013 HC RX MED GY IP 250 OP 250 PS 637

## 2024-04-30 PROCEDURE — 97110 THERAPEUTIC EXERCISES: CPT | Mod: GP

## 2024-04-30 RX ORDER — MOMETASONE FUROATE MONOHYDRATE 50 UG/1
2 SPRAY, METERED NASAL DAILY PRN
COMMUNITY

## 2024-04-30 RX ORDER — NAPROXEN SODIUM 220 MG
220 TABLET ORAL DAILY PRN
COMMUNITY

## 2024-04-30 RX ADMIN — ASPIRIN 81 MG: 81 TABLET, COATED ORAL at 09:50

## 2024-04-30 RX ADMIN — THEOPHYLLINE ANHYDROUS 400 MG: 100 CAPSULE, EXTENDED RELEASE ORAL at 09:57

## 2024-04-30 RX ADMIN — TRAMADOL HYDROCHLORIDE 50 MG: 50 TABLET, COATED ORAL at 07:58

## 2024-04-30 RX ADMIN — CEFAZOLIN SODIUM 2 G: 2 INJECTION, SOLUTION INTRAVENOUS at 00:41

## 2024-04-30 RX ADMIN — LISINOPRIL 40 MG: 40 TABLET ORAL at 09:50

## 2024-04-30 RX ADMIN — ONDANSETRON 4 MG: 2 INJECTION INTRAMUSCULAR; INTRAVENOUS at 07:47

## 2024-04-30 RX ADMIN — ALBUTEROL SULFATE 2 PUFF: 90 AEROSOL, METERED RESPIRATORY (INHALATION) at 09:58

## 2024-04-30 RX ADMIN — ACETAMINOPHEN 975 MG: 325 TABLET, FILM COATED ORAL at 06:26

## 2024-04-30 RX ADMIN — TRAMADOL HYDROCHLORIDE 50 MG: 50 TABLET, COATED ORAL at 00:45

## 2024-04-30 RX ADMIN — SENNOSIDES AND DOCUSATE SODIUM 1 TABLET: 8.6; 5 TABLET ORAL at 09:50

## 2024-04-30 RX ADMIN — HYDROMORPHONE HYDROCHLORIDE 0.4 MG: 0.2 INJECTION, SOLUTION INTRAMUSCULAR; INTRAVENOUS; SUBCUTANEOUS at 05:06

## 2024-04-30 RX ADMIN — POLYETHYLENE GLYCOL 3350 17 G: 17 POWDER, FOR SOLUTION ORAL at 09:50

## 2024-04-30 ASSESSMENT — ACTIVITIES OF DAILY LIVING (ADL)
ADLS_ACUITY_SCORE: 26
ADLS_ACUITY_SCORE: 26
ADLS_ACUITY_SCORE: 25
ADLS_ACUITY_SCORE: 29
ADLS_ACUITY_SCORE: 29
ADLS_ACUITY_SCORE: 25
ADLS_ACUITY_SCORE: 25
ADLS_ACUITY_SCORE: 26
ADLS_ACUITY_SCORE: 26
ADLS_ACUITY_SCORE: 25
ADLS_ACUITY_SCORE: 26

## 2024-04-30 NOTE — DISCHARGE INSTRUCTIONS
You are scheduled a follow up with Tanvi KING NP at [facility] in [place] on May 13th at 9:30 AM.

## 2024-04-30 NOTE — PLAN OF CARE
"/85 (BP Location: Right arm)   Pulse 75   Temp 97.6  F (36.4  C) (Tympanic)   Resp 16   Ht 1.651 m (5' 5\")   Wt 102.1 kg (225 lb)   SpO2 95%   BMI 37.44 kg/m      A&O, BP slightly elevated 160s sys, afebrile, c/o pain to R hip 3-7/10 received PRN tramadolx1 and dilaudidx1 and scheduled tylenol per MAR, ice applied to site, dressing marked scant drainage, loss of IV access, new IV placed to L AC SL, received PRN zofran Ivx1 for nausea no emesis, peppermint placed at bedside, remains on RA, free from falls, good urine output, upto bathroom SBA with walker, ambulated in hallx1, call light within reach, SCDs on, bed alarm on, makes needs known.     Face to face report given with opportunity to observe patient.    Report given to LEATHA Lara RN   4/30/2024  7:01 AM    "

## 2024-04-30 NOTE — MEDICATION SCRIBE - ADMISSION MEDICATION HISTORY
Medication Scribe Admission Medication History    Admission medication history is complete. The information provided in this note is only as accurate as the sources available at the time of the update.    Information Source(s): Patient and CareEverywhere/SureScripts via in-person    Pertinent Information:   Pt manages her own medication and is a good historian. Pt held most vitamins, supplements and aspirin one week prior to surgery as directed.     Changes made to PTA medication list:  Added: aleve  Deleted: rx ibu (therapy completed), potassium (pt supplementing with diet/bananas)  Changed:   Input instructions on APAP, ASA and Supplements  Nasonex (from scheduled to PRN)  Trazodone (pt has not started taking, will start after surgery per instructions from PCP)  Theophylline (pt takes between 3-6 PM)  Updated time of day pt takes medications    Allergies reviewed with patient and updates made in EHR: yes    Medication History Completed By: Jocelyn Diaz 4/30/2024 8:15 AM    PTA Med List   Medication Sig Last Dose    acetaminophen (TYLENOL) 500 MG tablet Take 1,000 mg by mouth daily as needed for pain (Averages 4 times weekly) 4/28/2024 at 1400    albuterol (PROAIR HFA/PROVENTIL HFA/VENTOLIN HFA) 108 (90 Base) MCG/ACT inhaler Inhale 2 puffs into the lungs every 4 hours as needed for shortness of breath or wheezing 4/29/2024 at 0615    ALEVE 220 MG tablet Take 220 mg by mouth daily as needed (pain) (averages one time weekly) Past Month at held for surgery    aspirin (ASA) 81 MG EC tablet Take 81 mg by mouth every morning Past Week at held for surgery    Calcium-Vitamin D 600-5 MG-MCG TABS Take 1 tablet by mouth every morning Past Week at held for surgery    cetirizine (ZYRTEC) 10 MG tablet Take 1 tablet by mouth every morning 4/28/2024 at 1200    cholecalciferol (VITAMIN D3) 25 mcg (1000 units) capsule Take 1 capsule by mouth daily Past Week at held for surgery    fish oil-omega-3 fatty acids 1000 MG capsule Take 1  g by mouth daily Past Week at held for surgery    folic acid (FOLVITE) 400 MCG tablet Take 400 mcg by mouth every morning Past Week at held for surgery    Lutein 10 MG TABS Take 20 mg by mouth every morning Past Week at held for surgery    magnesium 100 MG CAPS Take 1 capsule by mouth at bedtime (for sleep) 4/28/2024 at HS    mometasone (NASONEX) 50 MCG/ACT nasal spray Spray 2 sprays into both nostrils daily as needed (allergies) (OTC unscented) prn at prn    Probiotic Product (PROBIOTIC PO) Take 1 capsule by mouth daily Past Week at held for surgery    ramipril (ALTACE) 10 MG capsule TAKE 1 CAPSULE BY MOUTH DAILY 4/29/2024 at 0600    theophylline (UNIPHYL) 400 MG 24 hr tablet TAKE 1 TABLET BY MOUTH DAILY (Patient taking differently: Take 400 mg by mouth daily Takes between 3-6 PM daily) 4/28/2024 at 1500    VITAMIN A PO Take 1 capsule by mouth every morning Past Week at held for surgery    zinc 50 MG TABS Take 1 tablet by mouth daily (will take an extra dose as needed for cold symptoms) Past Week

## 2024-04-30 NOTE — DISCHARGE SUMMARY
Date of Admission: 4/29/24    Date of Discharge: 4/30/24    Admitting Physician: Kirill Jose MD    Consultations: Hospitalist Service    Admitting Diagnosis: Right Hip Osteoarthritis    Discharge Diagnosis: Right Hip Osteoarthritis    Surgical Procedures Performed: Right Direct Anterior DEEPTHI    Hospital Complications: None    Discharge Medications: No Changes to home meds; Oxycodone 5-10 mg po every 4 hours added for pain control; ASA 81 BID for DVT prophylaxis    Discharge Disposition: Home    Discharge Diet: As at home    Discharge Activity: WBAT Right Lower Extremity; No Hip ROM precautions    Hospital Course:   The patient underwent a Right Direct Anterior DEEPTHI.  There were no intraoperative or immediate post operative complications.  Once stable in the PACU they were transferred to the hospital floor where they remained for the remainder of the hospital stay.  Pain management transitioned from IV to oral pain medications.  Physical/Occupational Therapy was consulted for early mobility and gait training as well as ADL training.  DVT prophylaxis was initiated on POD #1.  Vital signs and hemoglobin remained stable. The Hospitalist team was consulted for management of medical co-morbidities.  At time of discharge the patient was medically stable and cleared by Therapy for safe discharge home.        Follow-Up: 10-14 days [facility]    Questions: Call [phone] or [phone]

## 2024-04-30 NOTE — PROGRESS NOTES
Occupational Therapy: Orders received. Chart reviewed and discussed with care team.? Per physical therapist, Occupational Therapy is not indicated at this time.? Defer discharge recommendations to physical therapy and care team.? Will complete orders.

## 2024-04-30 NOTE — PLAN OF CARE
"Patient discharged at 11:45 AM via wheel chair accompanied by staff. Prescriptions sent to patients preferred pharmacy. All belongings sent with patient.     Discharge instructions reviewed with patient. Listed belongings gathered and returned to patient.     Patient discharged to home.     Surgical Patient   Surgical Procedures during stay: right total hip  Did patient receive discharge instruction on wound care and recognition of infection symptoms? Yes    MISC  Follow up appointment made:  Yes  Home medications returned to patient: Yes  Patient reports pain was well managed at discharge: Yes    /85 (BP Location: Right arm)   Pulse 90   Temp 97.4  F (36.3  C) (Tympanic)   Resp 18   Ht 1.651 m (5' 5\")   Wt 102.1 kg (225 lb)   SpO2 92%   BMI 37.44 kg/m       "

## 2024-04-30 NOTE — PROGRESS NOTES
04/30/24 1000   Appointment Info   Signing Clinician's Name / Credentials (PT) Nasrin Hernández DPT   Living Environment   People in Home sibling(s)  (Reports her sister will be staying with her)   Current Living Arrangements house   Home Accessibility no concerns   Living Environment Comments Pt lives alone in 2 story home but reports she does not use the second floor. Plans to have sister stay with her for a while. She has used to walker after previous back surgery but has not been using it recently.   Self-Care   Usual Activity Tolerance good   Current Activity Tolerance fair   Equipment Currently Used at Home cane, straight;walker, standard;grab bar, toilet;grab bar, tub/shower  (Was not using the walker)   Fall history within last six months no   General Information   Onset of Illness/Injury or Date of Surgery 04/29/24   Referring Physician Tanvi Novoa APRN CNP   Patient/Family Therapy Goals Statement (PT) D/c home today   Pertinent History of Current Problem (include personal factors and/or comorbidities that impact the POC) Pt is s/p right direct anterior DEEPTHI   Existing Precautions/Restrictions no known precautions/restrictions   Cognition   Affect/Mental Status (Cognition) WFL   Orientation Status (Cognition) oriented x 4   Follows Commands (Cognition) WFL   Pain Assessment   Patient Currently in Pain Yes, see Vital Sign flowsheet   Posture    Posture Not impaired   Range of Motion (ROM)   Range of Motion ROM deficits secondary to surgical procedure   Strength (Manual Muscle Testing)   Strength (Manual Muscle Testing) Deficits observed during functional mobility   Bed Mobility   Bed Mobility no deficits identified   Transfers   Transfers no deficits identified   Gait/Stairs (Locomotion)   Lamb Level (Gait) supervision   Assistive Device (Gait) walker, front-wheeled   Distance in Feet (Gait) 100'   Pattern (Gait) step-through   Comment, (Gait/Stairs) Pt safe and steady with ambulation. Pt  became nauseous during ambulation and began dry heaving.   Balance   Balance no deficits were identified   Sensory Examination   Sensory Perception WFL   Coordination   Coordination no deficits were identified   Muscle Tone   Muscle Tone no deficits were identified   Clinical Impression   Criteria for Skilled Therapeutic Intervention Evaluation only   PT Diagnosis (PT) s/p R DEEPTHI   Influenced by the following impairments Post op pain and impaired gait   Functional limitations due to impairments Decreased tolerance for functional mobility   Clinical Presentation (PT Evaluation Complexity) stable   Clinical Presentation Rationale Clinical judgement   Clinical Decision Making (Complexity) low complexity   Planned Therapy Interventions (PT) home exercise program   Risk & Benefits of therapy have been explained care plan/treatment goals reviewed;risks/benefits reviewed;current/potential barriers reviewed;evaluation/treatment results reviewed   Clinical Impression Comments Pt doing very well s/p R DEEPTHI. Plans to d/c home today. Need for additional PT to be determined at follow up with surgeon   PT Total Evaluation Time   PT Eval, Low Complexity Minutes (74102) 15   Physical Therapy Goals   PT Frequency One time eval and treatment only   PT Predicted Duration/Target Date for Goal Attainment 04/30/24   PT Goals PT Goal 1   PT: Goal 1 Knowledge of HEP   Interventions   Interventions Quick Adds Therapeutic Procedure   Therapeutic Procedure/Exercise   Ther. Procedure: strength, endurance, ROM, flexibillity Minutes (88659) 10   Treatment Detail/Skilled Intervention Provided pt with education regarding home safety as well as mobility at home and HEP. HEP prescribed included Quad sets, glute sets, heel slides, SAQ. Seated LAQ   PT Discharge Planning   PT Plan D/c home today   PT Discharge Recommendation (DC Rec) home;home with assist   PT Rationale for DC Rec See clinical impression/comments   Total Session Time   Timed Code  Treatment Minutes 10   Total Session Time (sum of timed and untimed services) 25

## 2024-04-30 NOTE — PLAN OF CARE
"Patient is alert and oriented, makes needs known. Up to chair for dinner, complaining of pain, PRN pain medications given with relief. Patient nauseated on and off, IV Zofran given, patient had 200ml emesis after dinner. Assessments as charted. /66   Pulse 61   Temp 97.1  F (36.2  C) (Tympanic)   Resp 16   Ht 1.651 m (5' 5\")   Wt 102.1 kg (225 lb)   SpO2 93%   BMI 37.44 kg/m      Face to face report given with opportunity to observe patient.    Report given to LEATHA Cornejo RN   4/29/2024  7:20 PM    "

## 2024-05-05 ENCOUNTER — APPOINTMENT (OUTPATIENT)
Dept: GENERAL RADIOLOGY | Facility: HOSPITAL | Age: 64
End: 2024-05-05
Attending: INTERNAL MEDICINE
Payer: COMMERCIAL

## 2024-05-05 ENCOUNTER — ANESTHESIA EVENT (OUTPATIENT)
Dept: EMERGENCY MEDICINE | Facility: HOSPITAL | Age: 64
End: 2024-05-05
Payer: COMMERCIAL

## 2024-05-05 ENCOUNTER — ANESTHESIA (OUTPATIENT)
Dept: EMERGENCY MEDICINE | Facility: HOSPITAL | Age: 64
End: 2024-05-05
Payer: COMMERCIAL

## 2024-05-05 ENCOUNTER — HOSPITAL ENCOUNTER (EMERGENCY)
Facility: HOSPITAL | Age: 64
Discharge: HOME | End: 2024-05-05
Attending: INTERNAL MEDICINE | Admitting: INTERNAL MEDICINE
Payer: COMMERCIAL

## 2024-05-05 VITALS
DIASTOLIC BLOOD PRESSURE: 79 MMHG | HEIGHT: 65 IN | OXYGEN SATURATION: 92 % | WEIGHT: 240.3 LBS | RESPIRATION RATE: 12 BRPM | HEART RATE: 67 BPM | SYSTOLIC BLOOD PRESSURE: 138 MMHG | TEMPERATURE: 97.4 F | BODY MASS INDEX: 40.04 KG/M2

## 2024-05-05 DIAGNOSIS — S73.004A DISLOCATION OF RIGHT HIP, INITIAL ENCOUNTER (H): ICD-10-CM

## 2024-05-05 PROCEDURE — 96372 THER/PROPH/DIAG INJ SC/IM: CPT | Performed by: INTERNAL MEDICINE

## 2024-05-05 PROCEDURE — 27266 TREAT HIP DISLOCATION: CPT | Performed by: NURSE ANESTHETIST, CERTIFIED REGISTERED

## 2024-05-05 PROCEDURE — 73502 X-RAY EXAM HIP UNI 2-3 VIEWS: CPT

## 2024-05-05 PROCEDURE — 99291 CRITICAL CARE FIRST HOUR: CPT

## 2024-05-05 PROCEDURE — 250N000009 HC RX 250: Performed by: NURSE ANESTHETIST, CERTIFIED REGISTERED

## 2024-05-05 PROCEDURE — 250N000011 HC RX IP 250 OP 636: Performed by: NURSE ANESTHETIST, CERTIFIED REGISTERED

## 2024-05-05 PROCEDURE — 64486 TAP BLOCK UNIL BY INJECTION: CPT | Mod: 78 | Performed by: NURSE ANESTHETIST, CERTIFIED REGISTERED

## 2024-05-05 PROCEDURE — 99283 EMERGENCY DEPT VISIT LOW MDM: CPT | Mod: 57 | Performed by: INTERNAL MEDICINE

## 2024-05-05 PROCEDURE — 27265 TREAT HIP DISLOCATION: CPT

## 2024-05-05 PROCEDURE — 27266 TREAT HIP DISLOCATION: CPT | Mod: 54 | Performed by: INTERNAL MEDICINE

## 2024-05-05 PROCEDURE — 64450 NJX AA&/STRD OTHER PN/BRANCH: CPT | Mod: 78 | Performed by: NURSE ANESTHETIST, CERTIFIED REGISTERED

## 2024-05-05 PROCEDURE — 250N000011 HC RX IP 250 OP 636: Performed by: INTERNAL MEDICINE

## 2024-05-05 PROCEDURE — 27266 TREAT HIP DISLOCATION: CPT | Mod: RT

## 2024-05-05 PROCEDURE — 999N000065 XR PELVIS 1/2 VIEWS

## 2024-05-05 RX ORDER — PROPOFOL 10 MG/ML
INJECTION, EMULSION INTRAVENOUS PRN
Status: DISCONTINUED | OUTPATIENT
Start: 2024-05-05 | End: 2024-05-05

## 2024-05-05 RX ORDER — LIDOCAINE HYDROCHLORIDE 20 MG/ML
INJECTION, SOLUTION INFILTRATION; PERINEURAL PRN
Status: DISCONTINUED | OUTPATIENT
Start: 2024-05-05 | End: 2024-05-05

## 2024-05-05 RX ADMIN — LIDOCAINE HYDROCHLORIDE 100 MG: 20 INJECTION, SOLUTION INFILTRATION; PERINEURAL at 05:55

## 2024-05-05 RX ADMIN — PROPOFOL 100 MG: 10 INJECTION, EMULSION INTRAVENOUS at 05:55

## 2024-05-05 RX ADMIN — HYDROMORPHONE HYDROCHLORIDE 1 MG: 1 INJECTION, SOLUTION INTRAMUSCULAR; INTRAVENOUS; SUBCUTANEOUS at 05:18

## 2024-05-05 ASSESSMENT — ENCOUNTER SYMPTOMS
CHEST TIGHTNESS: 0
CONFUSION: 0
ABDOMINAL DISTENTION: 0
FEVER: 0
HEADACHES: 0
BLOOD IN STOOL: 0
FLANK PAIN: 0
NAUSEA: 0
CHILLS: 0
ANAL BLEEDING: 0
DIAPHORESIS: 0
NUMBNESS: 0
HEMATURIA: 0
COLOR CHANGE: 0
DYSURIA: 0
ABDOMINAL PAIN: 0
PALPITATIONS: 0
HIP PAIN: 1
DIZZINESS: 0
VOICE CHANGE: 0
MYALGIAS: 0
BACK PAIN: 0
NECK PAIN: 0
VOMITING: 0
ARTHRALGIAS: 0
NECK STIFFNESS: 0
WHEEZING: 0
WOUND: 0
SHORTNESS OF BREATH: 0
COUGH: 0
LIGHT-HEADEDNESS: 0

## 2024-05-05 ASSESSMENT — ACTIVITIES OF DAILY LIVING (ADL)
ADLS_ACUITY_SCORE: 38

## 2024-05-05 ASSESSMENT — LIFESTYLE VARIABLES: TOBACCO_USE: 1

## 2024-05-05 ASSESSMENT — COLUMBIA-SUICIDE SEVERITY RATING SCALE - C-SSRS
1. IN THE PAST MONTH, HAVE YOU WISHED YOU WERE DEAD OR WISHED YOU COULD GO TO SLEEP AND NOT WAKE UP?: NO
6. HAVE YOU EVER DONE ANYTHING, STARTED TO DO ANYTHING, OR PREPARED TO DO ANYTHING TO END YOUR LIFE?: NO
2. HAVE YOU ACTUALLY HAD ANY THOUGHTS OF KILLING YOURSELF IN THE PAST MONTH?: NO

## 2024-05-05 NOTE — ANESTHESIA CARE TRANSFER NOTE
Patient: Rosa Alcocer    Procedure: * No procedures listed *       Diagnosis: * No pre-op diagnosis entered *  Diagnosis Additional Information: No value filed.    Anesthesia Type:   MAC     Note:    Oropharynx: spontaneously breathing  Level of Consciousness: awake and drowsy  Oxygen Supplementation: nasal cannula    Independent Airway: airway patency satisfactory and stable  Dentition: dentition unchanged  Vital Signs Stable: post-procedure vital signs reviewed and stable  Report to RN Given: handoff report given  Patient transferred to: Emergency Department    Handoff Report: Identifed the Patient, Identified the Reponsible Provider, Reviewed the pertinent medical history, Discussed the surgical course, Reviewed Intra-OP anesthesia mangement and issues during anesthesia, Set expectations for post-procedure period and Allowed opportunity for questions and acknowledgement of understanding    Vitals:  Vitals Value Taken Time   /76 05/05/24 0606   Temp     Pulse 73 05/05/24 0610   Resp 13 05/05/24 0610   SpO2 93 % 05/05/24 0610   Vitals shown include unfiled device data.    Electronically Signed By: JOSUE Soliman CRNA  May 5, 2024  6:10 AM

## 2024-05-05 NOTE — ED NOTES
Patient's sister was contacted and stated that the patient is not able to get into her vehicle. Health line was contacted.

## 2024-05-05 NOTE — ANESTHESIA PREPROCEDURE EVALUATION
Anesthesia Pre-Procedure Evaluation    Patient: Rosa Alcocer   MRN: [id number] : 1960        Procedure :           Past Medical History:   Diagnosis Date     Asthma      Benign essential hypertension      Colon cancer (H)      Osteopenia      frax 7.5/0.6%     Post concussive syndrome      Thyroid nodule     4; repeat annual ultrsound      Past Surgical History:   Procedure Laterality Date     ARTHROPLASTY HIP ANTERIOR Right 2024    Procedure: Right Direct Anterior Total Hip Arthroplasty;  Surgeon: Kirill Jose MD;  Location: [facility]     DILATION AND CURETTAGE, OPERATIVE HYSTEROSCOPY, COMBINED N/A 2023    Procedure: HYSTEROSCOPY, WITH DILATION AND CURETTAGE OF UTERUS;  Surgeon: Bakari Lott MD;  Location: [facility]     SALPINGO-OOPHORECTOMY BILATERAL Bilateral 2023    Procedure: LAPAROSCOPIC SALPINGO-OOPHORECTOMY, BILATERAL and peritoneal biopsies;  Surgeon: Bakari Lott MD;  Location: [facility]      Allergies   Allergen Reactions     Codeine Shortness Of Breath and Swelling     Fentanyl Other (See Comments)     Bradycardia & Hypotension     Meloxicam Other (See Comments)     Hypersensitive, SOB, insomnia, HTN     No Clinical Screening - See Comments Other (See Comments) and Shortness Of Breath     MOLD, GRASSES. Some cleaning products, perfumes, tar,  Skunk scent     Prochlorperazine Hives, Shortness Of Breath and Swelling     COMPAZINE       Shellfish Allergy Hives, Shortness Of Breath and Swelling     Amlodipine Other (See Comments)     Hip pain and edema.     Cyclobenzaprine Other (See Comments)     Jittery, leg cramps, insomnia,      Losartan Cough, Itching and Muscle Pain (Myalgia)     Fluticasone Swelling     Oxycodone Headache and Nausea     Budesonide-Formoterol Fumarate Other (See Comments) and Palpitations     Leg pains      Social History     Tobacco Use     Smoking status: Former     Current packs/day: 0.00     Average packs/day: 2.0 packs/day for 14.0 years (27.9 ttl  pk-yrs)     Types: Cigarettes     Start date: 1970     Quit date: 1977     Years since quittin.3     Passive exposure: Past     Smokeless tobacco: Never   Substance Use Topics     Alcohol use: Not Currently      Wt Readings from Last 1 Encounters:   24 109 kg (240 lb 4.8 oz)        Anesthesia Evaluation   Pt has had prior anesthetic. Type: MAC and General (Patient does not wake up easily and states that she can't have Fentanyl).    History of anesthetic complications  - .  Patient does not wake up easily and states that she can't have Fentanyl-was related to a colonoscopy done in [facility].    ROS/MED HX  ENT/Pulmonary: Comment: Dysphonia  Vasomotor rhinitis    (+)     VIBHA risk factors,  hypertension, obese,        tobacco use (quit ), Past use,  28  Pack-Year Hx,   Moderate Persistent, asthma (Patient has a longstanding history of moderate-severe Asthma . Patient has been doing well overall noting NO SYMPTOMS and continues on medication regimen consisting of Theophylline, Albuterol, zyrtec and Nasonex without adverse reactions or side effects.)  Treatment: Inhaler daily and Inhaler prn,                 Neurologic: Comment: Post concussive syndrome  Lumbar spondylosis  Insomnia     S/p Left Radiofrequency percutaneous neurotomy of the L3, L4 medial branches and the L5 dorsal ramus, to cover the facet joints of L4/L5 and L5/S1    history of a fall at work on2019, and sustained a traumatic brain injury and has chronic headaches and postconcussive syndrome. She was taking the garbage out and hit a patch of ice and hit her buttock and her head. Did do occupational therapy and see neurooptics and had prism glasses.    (+)                   Multiple Sclerosis, limitations: history of multiple sclerosis, but has been in remission for years, not on current treatment-no relaspe for 5/6 years per patient.         (-) no CVA   Cardiovascular: Comment: Mild tricuspid regurgitation 2006   mild  aortic and tricuspid regurgitation. Normal left ventricular size and function. Normal right sided pressures by doppler,     BP has been elevated with surgeries- was instructed to take her Ramipril - not hold it day of. (24 HP)      (+) Dyslipidemia hypertension-range: 128/84 (24)/ -   -  - -   Taking blood thinners (aspirin) Pt has received instructions: Instructions Given to patient: Discontinue aspirin 7-10 days prior to procedure.                            Previous cardiac testing   Echo: Date: 2006 Results:  Transthoracic Echo  CONCLUSION:  1. Normal left ventricular size and function.  2. Mild aortic and tricuspid regurgitation.  3. Normal right-sided cardiac pressures by Doppler.  4. Right-sided cardiac chambers do not appear significantly enlarged on this study     Per cards note : stress echocardiogram that did suggest some right-sided cardiac  chamber enlargement, but otherwise the left ventricle was normal size with  normal function and no evidence of ischemia     Stress Test:  Date: Results:    ECG Reviewed:  Date: 24 Results:  Sinus rhythm   Right bundle branch block   Minimal voltage criteria for LVH, may be normal variant ( R in aVL )   Possible Inferior infarct , age undetermined   T wave abnormality, consider lateral ischemia   Abnormal ECG     Cath:  Date: Results:   (-) CHF and FRANCISCO   METS/Exercise Tolerance:     Hematologic: Comments: Brother, father and sister had hx of blood clots but are  now  - related to heart valve replacement.  No noted personal history of blood clots.      Musculoskeletal: Comment: Osteopenia (on fosamax)  History of osteomyelitis-Jaw  (+)  arthritis,          Muscular Dystrophy: right hip.   GI/Hepatic: Comment: Diverticulosis    (+)   esophageal disease,                 Renal/Genitourinary:  - neg Renal ROS     Endo: Comment: Nodule is being watched, biopsy scheduled for May-has been coughing, no trouble swallowing pills or food    (+)        "   thyroid problem, hypothyroidism nodule,    Obesity,       Psychiatric/Substance Use:  - neg psychiatric ROS     Infectious Disease:  - neg infectious disease ROS     Malignancy:   (+) Malignancy (colon), History of GI.GI CA  Remission status post.      Other:  - neg other ROS          Physical Exam    Airway  airway exam normal      Mallampati: III   TM distance: > 3 FB   Neck ROM: full   Mouth opening: > 3 cm    Respiratory Devices and Support         Dental       (+) Minor Abnormalities - some fillings, tiny chips      Cardiovascular   cardiovascular exam normal       Rhythm and rate: regular and normal     Pulmonary   pulmonary exam normal        breath sounds clear to auscultation       OUTSIDE LABS:  CBC:   Lab Results   Component Value Date    WBC 6.4 04/22/2024    WBC 8.8 11/29/2023    HGB 10.5 (L) 04/30/2024    HGB 12.6 04/22/2024    HCT 38.2 04/22/2024    HCT 40.6 11/29/2023     04/22/2024     11/29/2023     BMP:   Lab Results   Component Value Date     04/22/2024     11/29/2023    POTASSIUM 4.0 04/22/2024    POTASSIUM 4.0 11/29/2023    CHLORIDE 105 04/22/2024    CHLORIDE 104 11/29/2023    CO2 25 04/22/2024    CO2 23 11/29/2023    BUN 18.9 04/22/2024    BUN 19.8 11/29/2023    CR 1.12 (H) 04/22/2024    CR 1.01 (H) 11/29/2023     (H) 04/29/2024     (H) 04/22/2024     COAGS:   Lab Results   Component Value Date    PTT 29 04/22/2024    INR 0.98 04/22/2024     POC: No results found for: \"BGM\", \"HCG\", \"HCGS\"  HEPATIC:   Lab Results   Component Value Date    ALBUMIN 4.4 04/22/2024    PROTTOTAL 7.2 04/22/2024    ALT 23 04/22/2024    AST 32 04/22/2024    ALKPHOS 144 04/22/2024    BILITOTAL 0.3 04/22/2024     OTHER:   Lab Results   Component Value Date    DARY 9.7 04/22/2024    TSH 1.28 11/29/2023       Anesthesia Plan    ASA Status:  3    NPO Status:  NPO Appropriate    Anesthesia Type: MAC.     - Reason for MAC: straight local not clinically adequate, chronic " "cardiopulmonary disease              Consents    Anesthesia Plan(s) and associated risks, benefits, and realistic alternatives discussed. Questions answered and patient/representative(s) expressed understanding.     - Discussed: Risks, Benefits and Alternatives for BOTH SEDATION and the PROCEDURE were discussed     - Discussed with:  Patient      - Extended Intubation/Ventilatory Support Discussed: No.      - Patient is DNR/DNI Status: No     Use of blood products discussed: No .     Postoperative Care            Comments:    Other Comments: Risks and benefits of MAC anesthetic discussed including dental damage, aspiration, loss of airway, conversion to general anesthetic, CV complications, MI, stroke, death. Pt wishes to proceed.          JOSUE Soliman CRNA    I have reviewed the pertinent notes and labs in the chart from the past 30 days and (re)examined the patient.  Any updates or changes from those notes are reflected in this note.             # Drug Induced Platelet Defect: home medication list includes an antiplatelet medication  # Obesity: Estimated body mass index is 39.99 kg/m  as calculated from the following:    Height as of this encounter: 1.651 m (5' 5\").    Weight as of this encounter: 109 kg (240 lb 4.8 oz).      "

## 2024-05-05 NOTE — ED TRIAGE NOTES
Patient arrives via ambulance with complaints of right hip pain that started this morning about 3am. She had a hip replacement done on Monday and has really had no complications since then.

## 2024-05-05 NOTE — ED PROVIDER NOTES
History     Chief Complaint   Patient presents with    Hip Pain     The history is provided by the patient.   Hip Pain  Location:  Hip  Time since incident:  1 hour  Injury: no    Hip location:  R hip  Pain details:     Quality:  Aching    Severity:  Severe    Onset quality:  Sudden    Duration:  1 hour  Chronicity:  New  Associated symptoms: no back pain, no fever and no neck pain          Allergies:  Allergies   Allergen Reactions    Codeine Shortness Of Breath and Swelling    Fentanyl Other (See Comments)     Bradycardia & Hypotension    Meloxicam Other (See Comments)     Hypersensitive, SOB, insomnia, HTN    No Clinical Screening - See Comments Other (See Comments) and Shortness Of Breath     MOLD, GRASSES. Some cleaning products, perfumes, tar,  Skunk scent    Prochlorperazine Hives, Shortness Of Breath and Swelling     COMPAZINE      Shellfish Allergy Hives, Shortness Of Breath and Swelling    Amlodipine Other (See Comments)     Hip pain and edema.    Cyclobenzaprine Other (See Comments)     Jittery, leg cramps, insomnia,     Losartan Cough, Itching and Muscle Pain (Myalgia)    Fluticasone Swelling    Oxycodone Headache and Nausea    Budesonide-Formoterol Fumarate Other (See Comments) and Palpitations     Leg pains       Problem List:    Patient Active Problem List    Diagnosis Date Noted    S/P total right hip arthroplasty 04/29/2024     Priority: Medium    Osteopenia 03/02/2023     Priority: Medium    Esophagitis 10/13/2022     Priority: Medium    Essential hypertension 10/13/2022     Priority: Medium    Hyperlipidemia 10/13/2022     Priority: Medium    MS (multiple sclerosis) (H) 10/13/2022     Priority: Medium     history of multiple sclerosis, but has been in remission for years, not on current treatment.        Thyroid nodule 10/13/2022     Priority: Medium     Jan 2022 - stable nodules with recommendations to recheck in 1 year.        Class 2 obesity due to excess calories without serious comorbidity  with body mass index (BMI) of 36.0 to 36.9 in adult 10/13/2022     Priority: Medium    Lumbar spondylosis 06/08/2022     Priority: Medium     S/p Left Radiofrequency percutaneous neurotomy of the L3, L4 medial branches and the L5 dorsal ramus, to cover the facet joints of L4/L5 and L5/S1.         History of osteomyelitis 01/06/2022     Priority: Medium     Of jaw, secondary to tooth abscess. S/p IV abx and PICC. Had 3 surgeries and lost 7 teeth. Follow up with ID as needed.      Post concussive syndrome 12/21/2020     Priority: Medium     history of a fall at work on12/26/2019, and sustained a traumatic brain injury and has chronic headaches and postconcussive syndrome. She was taking the garbage out and hit a patch of ice and hit her buttock and her head. Did do occupational therapy and see neurooptics and had prism glasses.        Vasomotor rhinitis 06/29/2018     Priority: Medium    Diverticulosis 07/25/2017     Priority: Medium    Personal history of colonic polyps 07/25/2017     Priority: Medium     2/24/22 - rpt in five years       Dysphonia 10/21/2014     Priority: Medium    Moderate persistent asthma without complication 06/18/2012     Priority: Medium     on theophylline. She was seen by Dr. Moreno for her asthma in the past and this was recommended by him. Her symptoms are tirggered by heat and humidity. She uses ventolin about once per day and feels symptoms.        Tubular adenoma 03/09/2009     Priority: Medium     On colonoscopy 2009.  Repeat colonoscopy in 1 year per GI Recommendations (see note).          Past Medical History:    Past Medical History:   Diagnosis Date    Asthma     Benign essential hypertension     Colon cancer (H) 2008    Osteopenia     Post concussive syndrome     Thyroid nodule        Past Surgical History:    Past Surgical History:   Procedure Laterality Date    ARTHROPLASTY HIP ANTERIOR Right 4/29/2024    Procedure: Right Direct Anterior Total Hip Arthroplasty;  Surgeon: Rebeca  MD Kirill;  Location: [facility]    DILATION AND CURETTAGE, OPERATIVE HYSTEROSCOPY, COMBINED N/A 2023    Procedure: HYSTEROSCOPY, WITH DILATION AND CURETTAGE OF UTERUS;  Surgeon: Bakari Lott MD;  Location: [facility]    SALPINGO-OOPHORECTOMY BILATERAL Bilateral 2023    Procedure: LAPAROSCOPIC SALPINGO-OOPHORECTOMY, BILATERAL and peritoneal biopsies;  Surgeon: Bakari Lott MD;  Location: [facility]       Family History:    Family History   Problem Relation Age of Onset    Alzheimer Disease Mother     Alcoholism Mother     Myocardial Infarction Mother     Thrombosis Mother     Cerebrovascular Disease Father     Hearing Loss Father     Myocardial Infarction Father     Valvular heart disease Brother     Cerebrovascular Disease Brother     Cancer Brother     Hypertension Brother     Skin Cancer Brother     Osteopenia Brother     Hypertension Sister     Diabetes Sister     Valvular heart disease Sister     Aortic Valve Replacement Sister     Hypertension Sister     Breast Cancer Sister     Macular Degeneration Sister     Hypertension Sister     Osteopenia Sister        Social History:  Marital Status:  Single [1]  Social History     Tobacco Use    Smoking status: Former     Current packs/day: 0.00     Average packs/day: 2.0 packs/day for 14.0 years (27.9 ttl pk-yrs)     Types: Cigarettes     Start date: 1970     Quit date: 1977     Years since quittin.3     Passive exposure: Past    Smokeless tobacco: Never   Vaping Use    Vaping status: Never Used   Substance Use Topics    Alcohol use: Not Currently    Drug use: Never        Medications:    aspirin 81 MG EC tablet  HYDROcodone-acetaminophen (NORCO) 5-325 MG tablet  ramipril (ALTACE) 10 MG capsule  theophylline (UNIPHYL) 400 MG 24 hr tablet  traZODone (DESYREL) 50 MG tablet  albuterol (PROAIR HFA/PROVENTIL HFA/VENTOLIN HFA) 108 (90 Base) MCG/ACT inhaler  ALEVE 220 MG tablet  Calcium-Vitamin D 600-5 MG-MCG TABS  cetirizine (ZYRTEC) 10 MG  "tablet  cholecalciferol (VITAMIN D3) 25 mcg (1000 units) capsule  fish oil-omega-3 fatty acids 1000 MG capsule  folic acid (FOLVITE) 400 MCG tablet  Lutein 10 MG TABS  magnesium 100 MG CAPS  mometasone (NASONEX) 50 MCG/ACT nasal spray  Probiotic Product (PROBIOTIC PO)  VITAMIN A PO  zinc 50 MG TABS          Review of Systems   Constitutional:  Negative for chills, diaphoresis and fever.   HENT:  Negative for voice change.    Eyes:  Negative for visual disturbance.   Respiratory:  Negative for cough, chest tightness, shortness of breath and wheezing.    Cardiovascular:  Negative for chest pain, palpitations and leg swelling.   Gastrointestinal:  Negative for abdominal distention, abdominal pain, anal bleeding, blood in stool, nausea and vomiting.   Genitourinary:  Negative for decreased urine volume, dysuria, flank pain and hematuria.   Musculoskeletal:  Negative for arthralgias, back pain, gait problem, myalgias, neck pain and neck stiffness.   Skin:  Negative for color change, pallor, rash and wound.   Neurological:  Negative for dizziness, syncope, light-headedness, numbness and headaches.   Psychiatric/Behavioral:  Negative for confusion and suicidal ideas.        Physical Exam   BP: 149/73  Pulse: 74  Temp: 97.4  F (36.3  C)  Resp: 18  Height: 165.1 cm (5' 5\")  Weight: 109 kg (240 lb 4.8 oz)  SpO2: 96 %      Physical Exam  Constitutional:       General: She is not in acute distress.     Appearance: Normal appearance. She is not diaphoretic.   HENT:      Head: Atraumatic.      Mouth/Throat:      Mouth: Mucous membranes are moist.   Eyes:      General: No scleral icterus.     Conjunctiva/sclera: Conjunctivae normal.   Cardiovascular:      Rate and Rhythm: Normal rate.      Heart sounds: Normal heart sounds.   Pulmonary:      Effort: No respiratory distress.      Breath sounds: Normal breath sounds.   Abdominal:      General: Abdomen is flat.   Musculoskeletal:      Cervical back: Neck supple.      Right hip: " Deformity, tenderness and bony tenderness present. Decreased range of motion.   Skin:     General: Skin is warm.      Findings: No rash.   Neurological:      Mental Status: She is alert.         ED Course        [facility]    -Dislocation - Lower Extremity    Date/Time: 5/5/2024 6:47 AM    Performed by: Morales Felton MD  Authorized by: Morales Felton MD    Risks, benefits and alternatives discussed.      LOCATION     Location:  Hip    Hip injury location:  R hip    Hip dislocation type: posterior      Etiology: spontaneous      Prosthesis: yes      PRE PROCEDURE DETAILS:     Distal perfusion: normal      Range of motion: reduced      PROCEDURE DETAILS      Hip reduction method:  Allis maneuver    Reduction successful: yes      Reduction confirmed with imaging: yes      POST PROCEDURE DETAILS      Neurological function: normal      Distal perfusion: normal      Range of motion: normal        PROCEDURE    Patient Tolerance:  Patient tolerated the procedure well with no immediate complications                  No results found for this or any previous visit (from the past 24 hour(s)).    Medications   HYDROmorphone (DILAUDID) injection 1 mg (1 mg Intramuscular $Given 5/5/24 5992)       Assessments & Plan (with Medical Decision Making)   Woke up with right hip pain and not able to move her hip  Hx of total hip replacement last Monday    Xray: right hip dislocation  Pt sedated anesthesia  Reduced successfully  Discontinue home, follow-up with ortho    I have reviewed the nursing notes.    I have reviewed the findings, diagnosis, plan and need for follow up with the patient.        New Prescriptions    No medications on file       Final diagnoses:   Dislocation of right hip, initial encounter (H)       5/5/2024   [facility]       Morales Felton MD  05/05/24 0625

## 2024-05-05 NOTE — ANESTHESIA POSTPROCEDURE EVALUATION
Patient: Rosa Alcocer    Procedure: * No procedures listed *       Anesthesia Type:  MAC    Note:  Disposition: Outpatient   Postop Pain Control: Uneventful            Sign Out: Well controlled pain   PONV:    Neuro/Psych: Uneventful            Sign Out: Acceptable/Baseline neuro status   Airway/Respiratory: Uneventful            Sign Out: Acceptable/Baseline resp. status   CV/Hemodynamics: Uneventful            Sign Out: Acceptable CV status; No obvious hypovolemia; No obvious fluid overload   Other NRE: NONE   DID A NON-ROUTINE EVENT OCCUR? No         Last vitals:  Vitals:    05/05/24 0559 05/05/24 0600 05/05/24 0606   BP:  128/67 136/76   Pulse:  65 71   Resp:  22 12   Temp:      SpO2: (!) 87% 94% 93%       Electronically Signed By: JOSUE Soliman CRNA  May 5, 2024  6:11 AM

## 2024-05-28 DIAGNOSIS — Z00.00 ROUTINE ADULT HEALTH MAINTENANCE: Primary | ICD-10-CM

## 2024-05-28 DIAGNOSIS — Z96.641 STATUS POST TOTAL REPLACEMENT OF RIGHT HIP: ICD-10-CM

## 2024-05-28 NOTE — TELEPHONE ENCOUNTER
Reason for call:  Medication      Have you contacted your pharmacy? No, she needs two new prescriptions first one is Calcium Plus D and 81 mg baby aspirin  If patient has contacted Pharmacy and it has been over 72hrs, continue to #2  Medication: Calcium plus D and Baby Aspirin 81 mg patient doesn't have prescriptions on these medications  What [facility] do you use? [facility]       (Please note that the turn-around-time for prescriptions is 72 business hours; I am sending your request at this time. SEND TO appropriate Care Team Pool )

## 2024-05-31 RX ORDER — ASPIRIN 81 MG/1
81 TABLET ORAL 2 TIMES DAILY
Qty: 60 TABLET | Refills: 0 | Status: SHIPPED | OUTPATIENT
Start: 2024-05-31 | End: 2024-08-08

## 2024-05-31 NOTE — TELEPHONE ENCOUNTER
Aspirin 81mg  Last Written Prescription Date:  4/29/2024  Last Fill Quantity: 60,   # refills: 0  Last Office Visit: 4/22/2024  Future Office visit:    Next 5 appointments (look out 90 days)      Jun 13, 2024  3:30 PM  (Arrive by 3:15 PM)  Adult Preventative Visit with [facility]  [facility] [address] [facility] [address]  [phone]             Routing refill request to provider for review/approval because:  Drug not on the [facility], [facility] or [facility] refill protocol or controlled substance  Calcium-Vitamin D 600-5mg-mcg    Last Written Prescription Date:  4/30/2024  Last Fill Quantity: Historical,   # refills: Historical   Last Office Visit: 4/22/2024  Future Office visit:    Next 5 appointments (look out 90 days)      Jun 13, 2024  3:30 PM  (Arrive by 3:15 PM)  Adult Preventative Visit with Salma Masterson MD  [facility] [place] ([facility] [place] ) [address]  [place] [address]  [phone]             Routing refill request to provider for review/approval because:  Drug not on the [facility], [facility] or [facility] refill protocol or controlled substance

## 2024-06-03 ENCOUNTER — TRANSFERRED RECORDS (OUTPATIENT)
Dept: HEALTH INFORMATION MANAGEMENT | Facility: CLINIC | Age: 64
End: 2024-06-03

## 2024-06-07 ENCOUNTER — TELEPHONE (OUTPATIENT)
Dept: INTERVENTIONAL RADIOLOGY/VASCULAR | Facility: HOSPITAL | Age: 64
End: 2024-06-07

## 2024-06-07 RX ORDER — LIDOCAINE 40 MG/G
CREAM TOPICAL
Status: CANCELLED | OUTPATIENT
Start: 2024-06-07

## 2024-06-07 RX ORDER — DEXTROSE MONOHYDRATE 25 G/50ML
25-50 INJECTION, SOLUTION INTRAVENOUS
Status: CANCELLED | OUTPATIENT
Start: 2024-06-07

## 2024-06-07 RX ORDER — NICOTINE POLACRILEX 4 MG
15-30 LOZENGE BUCCAL
Status: CANCELLED | OUTPATIENT
Start: 2024-06-07

## 2024-06-07 RX ORDER — LIDOCAINE HYDROCHLORIDE 10 MG/ML
10 INJECTION, SOLUTION EPIDURAL; INFILTRATION; INTRACAUDAL; PERINEURAL ONCE
Status: CANCELLED | OUTPATIENT
Start: 2024-06-07 | End: 2024-06-07

## 2024-06-07 RX ORDER — SODIUM CHLORIDE 9 MG/ML
INJECTION, SOLUTION INTRAVENOUS CONTINUOUS PRN
Status: CANCELLED | OUTPATIENT
Start: 2024-06-07

## 2024-06-10 ENCOUNTER — HOSPITAL ENCOUNTER (OUTPATIENT)
Dept: ULTRASOUND IMAGING | Facility: HOSPITAL | Age: 64
Discharge: HOME | End: 2024-06-10
Attending: FAMILY MEDICINE
Payer: COMMERCIAL

## 2024-06-10 ENCOUNTER — HOSPITAL ENCOUNTER (OUTPATIENT)
Facility: HOSPITAL | Age: 64
Discharge: HOME | End: 2024-06-10
Attending: RADIOLOGY | Admitting: RADIOLOGY
Payer: COMMERCIAL

## 2024-06-10 VITALS
RESPIRATION RATE: 16 BRPM | HEART RATE: 68 BPM | OXYGEN SATURATION: 96 % | DIASTOLIC BLOOD PRESSURE: 76 MMHG | SYSTOLIC BLOOD PRESSURE: 142 MMHG

## 2024-06-10 DIAGNOSIS — E04.1 THYROID NODULE: ICD-10-CM

## 2024-06-10 PROCEDURE — 250N000009 HC RX 250: Performed by: RADIOLOGY

## 2024-06-10 PROCEDURE — 88172 CYTP DX EVAL FNA 1ST EA SITE: CPT | Mod: 26 | Performed by: PATHOLOGY

## 2024-06-10 PROCEDURE — 250N000009 HC RX 250: Performed by: STUDENT IN AN ORGANIZED HEALTH CARE EDUCATION/TRAINING PROGRAM

## 2024-06-10 PROCEDURE — 10005 FNA BX W/US GDN 1ST LES: CPT

## 2024-06-10 PROCEDURE — 88173 CYTOPATH EVAL FNA REPORT: CPT | Mod: 26 | Performed by: PATHOLOGY

## 2024-06-10 PROCEDURE — 88173 CYTOPATH EVAL FNA REPORT: CPT | Mod: TC | Performed by: FAMILY MEDICINE

## 2024-06-10 RX ORDER — LIDOCAINE 40 MG/G
CREAM TOPICAL
Status: DISCONTINUED | OUTPATIENT
Start: 2024-06-10 | End: 2024-06-11 | Stop reason: HOSPADM

## 2024-06-10 RX ORDER — NICOTINE POLACRILEX 4 MG
15-30 LOZENGE BUCCAL
Status: DISCONTINUED | OUTPATIENT
Start: 2024-06-10 | End: 2024-06-11 | Stop reason: HOSPADM

## 2024-06-10 RX ORDER — DEXTROSE MONOHYDRATE 25 G/50ML
25-50 INJECTION, SOLUTION INTRAVENOUS
Status: DISCONTINUED | OUTPATIENT
Start: 2024-06-10 | End: 2024-06-11 | Stop reason: HOSPADM

## 2024-06-10 RX ORDER — SODIUM CHLORIDE 9 MG/ML
INJECTION, SOLUTION INTRAVENOUS CONTINUOUS PRN
Status: DISCONTINUED | OUTPATIENT
Start: 2024-06-10 | End: 2024-06-11 | Stop reason: HOSPADM

## 2024-06-10 RX ORDER — LIDOCAINE HYDROCHLORIDE 10 MG/ML
10 INJECTION, SOLUTION EPIDURAL; INFILTRATION; INTRACAUDAL; PERINEURAL ONCE
Status: COMPLETED | OUTPATIENT
Start: 2024-06-10 | End: 2024-06-10

## 2024-06-10 RX ADMIN — LIDOCAINE HYDROCHLORIDE 5 ML: 10 INJECTION, SOLUTION EPIDURAL; INFILTRATION; INTRACAUDAL; PERINEURAL at 11:56

## 2024-06-10 RX ADMIN — LIDOCAINE HYDROCHLORIDE 10 ML: 10 INJECTION, SOLUTION EPIDURAL; INFILTRATION; INTRACAUDAL; PERINEURAL at 11:24

## 2024-06-10 ASSESSMENT — ACTIVITIES OF DAILY LIVING (ADL)
ADLS_ACUITY_SCORE: 38
ADLS_ACUITY_SCORE: 38

## 2024-06-10 NOTE — PROGRESS NOTES
Procedure: Fine Needle Biopsy, Thyroid, right    There were  no complications and patient has no symptoms..      Tolerated procedure well.    Patient to US.  Consent complete.  Supine on Ultrasound table.      Radiologist:Dr Adkins    Time Out: Prior to the start of the procedure and with procedural staff participation, I verbally confirmed the patient s identity using two indicators, relevant allergies, that the procedure was appropriate and matched the consent or emergent situation, and that the correct equipment/implants were available. Immediately prior to starting the procedure I conducted the Time Out with the procedural staff and re-confirmed the patient s name, procedure, and site/side. (The Joint Commission universal protocol was followed.)  Yes    Position:  supine    Pain:  0    Sedation:None. Local Anesthestic used  No sedation    Estimated Blood Loss: Minimal     Condition: Stable    Comments: See dictated procedure note for full details     Sydney Mera RN

## 2024-06-11 ENCOUNTER — TELEPHONE (OUTPATIENT)
Dept: INTERVENTIONAL RADIOLOGY/VASCULAR | Facility: HOSPITAL | Age: 64
End: 2024-06-11

## 2024-06-11 NOTE — PROVIDER NOTIFICATION
"Contacted pt post right thyroid FNA/biopsy. Pt said that the right side of her neck feels sore and hurts to swallow. Pt stated she is not choking on food \"it is just the muscles are sore.\" Pt rates pain 3/10 after using ice and tylenol and 5/10 prior to tylenol. Pt reports no fevers or drainage from FNA site. Pt instructed that if swallowing becomes more difficult to please contact PCP. Pt verbalized understanding.  "

## 2024-06-13 ENCOUNTER — OFFICE VISIT (OUTPATIENT)
Dept: FAMILY MEDICINE | Facility: OTHER | Age: 64
End: 2024-06-13
Attending: FAMILY MEDICINE
Payer: COMMERCIAL

## 2024-06-13 VITALS
RESPIRATION RATE: 16 BRPM | HEART RATE: 78 BPM | TEMPERATURE: 98.8 F | SYSTOLIC BLOOD PRESSURE: 108 MMHG | DIASTOLIC BLOOD PRESSURE: 76 MMHG | HEIGHT: 65 IN | BODY MASS INDEX: 36.57 KG/M2 | WEIGHT: 219.5 LBS | OXYGEN SATURATION: 97 %

## 2024-06-13 DIAGNOSIS — I10 ESSENTIAL HYPERTENSION: Chronic | ICD-10-CM

## 2024-06-13 DIAGNOSIS — G35 MS (MULTIPLE SCLEROSIS) (H): ICD-10-CM

## 2024-06-13 DIAGNOSIS — J45.40 MODERATE PERSISTENT ASTHMA WITHOUT COMPLICATION: ICD-10-CM

## 2024-06-13 DIAGNOSIS — D64.9 ANEMIA, UNSPECIFIED TYPE: ICD-10-CM

## 2024-06-13 DIAGNOSIS — G47.00 INSOMNIA, UNSPECIFIED TYPE: ICD-10-CM

## 2024-06-13 DIAGNOSIS — M85.80 OSTEOPENIA, UNSPECIFIED LOCATION: ICD-10-CM

## 2024-06-13 DIAGNOSIS — E78.5 HYPERLIPIDEMIA, UNSPECIFIED HYPERLIPIDEMIA TYPE: Chronic | ICD-10-CM

## 2024-06-13 DIAGNOSIS — Z00.00 ENCOUNTER FOR MEDICARE ANNUAL WELLNESS EXAM: Primary | ICD-10-CM

## 2024-06-13 DIAGNOSIS — E04.1 THYROID NODULE: Chronic | ICD-10-CM

## 2024-06-13 DIAGNOSIS — R82.90 ABNORMAL URINE FINDINGS: ICD-10-CM

## 2024-06-13 LAB
ALBUMIN UR-MCNC: 30 MG/DL
ANION GAP SERPL CALCULATED.3IONS-SCNC: 13 MMOL/L (ref 7–15)
APPEARANCE UR: CLEAR
BILIRUB UR QL STRIP: NEGATIVE
BUN SERPL-MCNC: 19.9 MG/DL (ref 8–23)
CALCIUM SERPL-MCNC: 9.9 MG/DL (ref 8.8–10.2)
CHLORIDE SERPL-SCNC: 105 MMOL/L (ref 98–107)
COLOR UR AUTO: YELLOW
CREAT SERPL-MCNC: 1.14 MG/DL (ref 0.51–0.95)
CREAT UR-MCNC: 411.7 MG/DL
DEPRECATED HCO3 PLAS-SCNC: 22 MMOL/L (ref 22–29)
EGFRCR SERPLBLD CKD-EPI 2021: 53 ML/MIN/1.73M2
GLUCOSE SERPL-MCNC: 111 MG/DL (ref 70–99)
GLUCOSE UR STRIP-MCNC: NEGATIVE MG/DL
HGB BLD-MCNC: 10.9 G/DL (ref 11.7–15.7)
HGB UR QL STRIP: NEGATIVE
HYALINE CASTS: 6 /LPF
KETONES UR STRIP-MCNC: ABNORMAL MG/DL
LEUKOCYTE ESTERASE UR QL STRIP: NEGATIVE
MICROALBUMIN UR-MCNC: 57 MG/L
MICROALBUMIN/CREAT UR: 13.85 MG/G CR (ref 0–25)
MUCOUS THREADS #/AREA URNS LPF: PRESENT /LPF
NITRATE UR QL: NEGATIVE
PATH REPORT.COMMENTS IMP SPEC: NORMAL
PATH REPORT.FINAL DX SPEC: NORMAL
PATH REPORT.GROSS SPEC: NORMAL
PATH REPORT.MICROSCOPIC SPEC OTHER STN: NORMAL
PATH REPORT.RELEVANT HX SPEC: NORMAL
PH UR STRIP: 5.5 [PH] (ref 4.7–8)
POTASSIUM SERPL-SCNC: 4 MMOL/L (ref 3.4–5.3)
RBC URINE: 1 /HPF
SODIUM SERPL-SCNC: 140 MMOL/L (ref 135–145)
SP GR UR STRIP: 1.03 (ref 1–1.03)
SQUAMOUS EPITHELIAL: 0 /HPF
UROBILINOGEN UR STRIP-MCNC: NORMAL MG/DL
WBC URINE: 1 /HPF

## 2024-06-13 PROCEDURE — 99214 OFFICE O/P EST MOD 30 MIN: CPT | Mod: 25 | Performed by: FAMILY MEDICINE

## 2024-06-13 PROCEDURE — G0439 PPPS, SUBSEQ VISIT: HCPCS | Performed by: FAMILY MEDICINE

## 2024-06-13 PROCEDURE — 82306 VITAMIN D 25 HYDROXY: CPT | Mod: ZL | Performed by: FAMILY MEDICINE

## 2024-06-13 PROCEDURE — 85018 HEMOGLOBIN: CPT | Mod: ZL | Performed by: FAMILY MEDICINE

## 2024-06-13 PROCEDURE — G0463 HOSPITAL OUTPT CLINIC VISIT: HCPCS

## 2024-06-13 PROCEDURE — 81001 URINALYSIS AUTO W/SCOPE: CPT | Mod: ZL | Performed by: FAMILY MEDICINE

## 2024-06-13 PROCEDURE — 82570 ASSAY OF URINE CREATININE: CPT | Mod: ZL | Performed by: FAMILY MEDICINE

## 2024-06-13 PROCEDURE — 36415 COLL VENOUS BLD VENIPUNCTURE: CPT | Mod: ZL | Performed by: FAMILY MEDICINE

## 2024-06-13 PROCEDURE — 82374 ASSAY BLOOD CARBON DIOXIDE: CPT | Mod: ZL | Performed by: FAMILY MEDICINE

## 2024-06-13 RX ORDER — TRAZODONE HYDROCHLORIDE 100 MG/1
100 TABLET ORAL AT BEDTIME
Qty: 90 TABLET | Refills: 1 | Status: SHIPPED | OUTPATIENT
Start: 2024-06-13

## 2024-06-13 SDOH — HEALTH STABILITY: PHYSICAL HEALTH: ON AVERAGE, HOW MANY DAYS PER WEEK DO YOU ENGAGE IN MODERATE TO STRENUOUS EXERCISE (LIKE A BRISK WALK)?: 7 DAYS

## 2024-06-13 ASSESSMENT — ASTHMA QUESTIONNAIRES
QUESTION_5 LAST FOUR WEEKS HOW WOULD YOU RATE YOUR ASTHMA CONTROL: COMPLETELY CONTROLLED
ACT_TOTALSCORE: 22
ACT_TOTALSCORE: 22
QUESTION_3 LAST FOUR WEEKS HOW OFTEN DID YOUR ASTHMA SYMPTOMS (WHEEZING, COUGHING, SHORTNESS OF BREATH, CHEST TIGHTNESS OR PAIN) WAKE YOU UP AT NIGHT OR EARLIER THAN USUAL IN THE MORNING: NOT AT ALL
QUESTION_1 LAST FOUR WEEKS HOW MUCH OF THE TIME DID YOUR ASTHMA KEEP YOU FROM GETTING AS MUCH DONE AT WORK, SCHOOL OR AT HOME: NONE OF THE TIME
QUESTION_2 LAST FOUR WEEKS HOW OFTEN HAVE YOU HAD SHORTNESS OF BREATH: NOT AT ALL
QUESTION_4 LAST FOUR WEEKS HOW OFTEN HAVE YOU USED YOUR RESCUE INHALER OR NEBULIZER MEDICATION (SUCH AS ALBUTEROL): ONE OR TWO TIMES PER DAY

## 2024-06-13 ASSESSMENT — SOCIAL DETERMINANTS OF HEALTH (SDOH): HOW OFTEN DO YOU GET TOGETHER WITH FRIENDS OR RELATIVES?: MORE THAN THREE TIMES A WEEK

## 2024-06-13 ASSESSMENT — PAIN SCALES - GENERAL: PAINLEVEL: MILD PAIN (3)

## 2024-06-13 NOTE — PATIENT INSTRUCTIONS
"Patient Education   Preventive Care Advice   This is general advice we often give to help people stay healthy. Your care team may have specific advice just for you. Please talk to your care team about your own preventive care needs.  Lifestyle  Exercise at least 150 minutes each week (30 minutes a day, 5 days a week).  Do muscle strengthening activities 2 days a week. These help control your weight and prevent disease.  No smoking.  Wear sunscreen to prevent skin cancer.  Have your home tested for radon every 2 to 5 years. Radon is a colorless, odorless gas that can harm your lungs. To learn more, go to www.health.[facility].[place].us and search for \"Radon in Homes.\"  Keep guns unloaded and locked up in a safe place like a safe or gun vault, or, use a gun lock and hide the keys. Always lock away bullets separately. To learn more, visit [employer].[place].gov and search for \"safe gun storage.\"  Nutrition  Eat 5 or more servings of fruits and vegetables each day.  Try wheat bread, brown rice and whole grain pasta (instead of white bread, rice, and pasta).  Get enough calcium and vitamin D. Check the label on foods and aim for 100% of the RDA (recommended daily allowance).  Regular exams  Have a dental exam and cleaning every 6 months.  See your health care team every year to talk about:  Any changes in your health.  Any medicines your care team has prescribed.  Preventive care, family planning, and ways to prevent chronic diseases.  Shots (vaccines)   HPV shots (up to age 26), if you've never had them before.  Hepatitis B shots (up to age 59), if you've never had them before.  COVID-19 shot: Get this shot when it's due.  Flu shot: Get a flu shot every year.  Tetanus shot: Get a tetanus shot every 10 years.  Pneumococcal, hepatitis A, and RSV shots: Ask your care team if you need these based on your risk.  Shingles shot (for age 50 and up).  General health tests  Diabetes screening:  Starting at age 35, Get screened for diabetes at least " every 3 years.  If you are younger than age 35, ask your care team if you should be screened for diabetes.  Cholesterol test: At age 39, start having a cholesterol test every 5 years, or more often if advised.  Bone density scan (DEXA): At age 50, ask your care team if you should have this scan for osteoporosis (brittle bones).  Hepatitis C: Get tested at least once in your life.  Abdominal aortic aneurysm screening: Talk to your doctor about having this screening if you:  Have ever smoked; and  Are biologically male; and  Are between the ages of 65 and 75.  STIs (sexually transmitted infections)  Before age 24: Ask your care team if you should be screened for STIs.  After age 24: Get screened for STIs if you're at risk. You are at risk for STIs (including HIV) if:  You are sexually active with more than one person.  You don't use condoms every time.  You or a partner was diagnosed with a sexually transmitted infection.  If you are at risk for HIV, ask about PrEP medicine to prevent HIV.  Get tested for HIV at least once in your life, whether you are at risk for HIV or not.  Cancer screening tests  Cervical cancer screening: If you have a cervix, begin getting regular cervical cancer screening tests at age 21. Most people who have regular screenings with normal results can stop after age 65. Talk about this with your provider.  Breast cancer scan (mammogram): If you've ever had breasts, begin having regular mammograms starting at age 40. This is a scan to check for breast cancer.  Colon cancer screening: It is important to start screening for colon cancer at age 45.  Have a colonoscopy test every 10 years (or more often if you're at risk) Or, ask your provider about stool tests like a FIT test every year or Cologuard test every 3 years.  To learn more about your testing options, visit: www."[employer]"/846526.pdf.  For help making a decision, visit: christofer/oq33520.  Prostate cancer screening test: If you have a  prostate and are age 55 to 69, ask your provider if you would benefit from a yearly prostate cancer screening test.  Lung cancer screening: If you are a current or former smoker age 50 to 80, ask your care team if ongoing lung cancer screenings are right for you.  For informational purposes only. Not to replace the advice of your health care provider. Copyright   2023 [facility]. All rights reserved. Clinically reviewed by the [facility] Transitions Program. [employer] 190222 - REV 04/24.     Trazodone - increase to 100 mg nightly for insomnia.  ENT referral for thyroid nodules.  Labs today - will call with results.  Follow up with ortho as scheduled.  Will check on colon records - 3 year vs 5.

## 2024-06-13 NOTE — PROGRESS NOTES
"Preventive Care Visit  [facility]  Salma Rios MD, Family Medicine  Jun 13, 2024      Assessment & Plan     Encounter for Medicare annual wellness exam  Preventative cares reviewed.  Cancer screenings up to date.  Vaccines up to date.  Defers covid booster.    MS (multiple sclerosis) (H)  Managed by neurology.    Moderate persistent asthma without complication  Stable.    Hyperlipidemia, unspecified hyperlipidemia type  Stable - fasting lab next week.  Continue statin.    Essential hypertension  At goal.  Continue Altace 10 mg.  - Basic metabolic panel; Future  - Basic metabolic panel    Thyroid nodule  Biopsy benign.  However, some other significant findings.  ENT referral placed - appreciate the consult.  - Adult ENT  Referral; Future    Osteopenia, unspecified location  DEXA every 2 years.  Last one 3/2023.  FRAX 7.5%/0.35  continue  - Vitamin D Deficiency; Future  - Vitamin D Deficiency    Anemia, unspecified type  Update hgb - post op anemia.  - Hemoglobin; Future  - Hemoglobin    Abnormal urine findings  Casts, protein.  Repeat UA and add urine microalbumin and BMP.  Related to surgery? Dehydration?  - UA Macroscopic with reflex to Microscopic and Culture; Future  - Albumin Random Urine Quantitative with Creat Ratio; Future  - UA Macroscopic with reflex to Microscopic and Culture  - Albumin Random Urine Quantitative with Creat Ratio    Insomnia, unspecified type  Increase Trazodone to 100 mg nightly.  - traZODone (DESYREL) 100 MG tablet; Take 1 tablet (100 mg) by mouth at bedtime      BMI  Estimated body mass index is 36.53 kg/m  as calculated from the following:    Height as of this encounter: 1.651 m (5' 5\").    Weight as of this encounter: 99.6 kg (219 lb 8 oz).   Weight management plan: Discussed healthy diet and exercise guidelines    Counseling  Appropriate preventive services were discussed with this patient, including applicable screening as appropriate for fall prevention, " nutrition, physical activity, Tobacco-use cessation, weight loss and cognition.  Checklist reviewing preventive services available has been given to the patient.  Reviewed patient's diet, addressing concerns and/or questions.   Discussed possible causes of fatigue. Updated plan of care.  Patient reported difficulty with activities of daily living were addressed today.Information on urinary incontinence and treatment options given to patient.   I have reviewed Opioid Use Disorder and Substance Use Disorder risk factors and made any needed referrals.       See Patient Instructions    No follow-ups on file.    Margaret Lee is a 64 year old, presenting for the following:  Physical          6/13/2024     3:23 PM   Additional Questions   Roomed by Brayden Chappell   Accompanied by None         6/13/2024     3:23 PM   Patient Reported Additional Medications   Patient reports taking the following new medications None         Health Care Directive  Patient does not have a Health Care Directive or Living Will: Discussed advance care planning with patient; however, patient declined at this time.    HPI      Just had hip replacement- right - had complication of hip dislocation in sleep; couldn't move; had to call 911.  PT yet to be started.  Has follow up next week with Dr Jose.    Mammo 3/2024 - negative.  Colonoscopy 2022 - due 2027 per HCM.  However, per patient due in 3 years - last one was [facility] -     Thyroid biopsy - see report today - benign- but extensive description -   Some pain swallowing since.   No abnormal bleeding.  Eating stable since biopsy.    Insomnia- Trazodone nightly. 50 mg - not effective; no side effects.  Falls asleep - up 10-20 min later.    Hyperlipidemia Follow-Up  Are you regularly taking any medication or supplement to lower your cholesterol?   No  Are you having muscle aches or other side effects that you think could be caused by your cholesterol lowering medication?  No  No cholesterol  med    Hypertension Follow-up - Altace 10 mg  Do you check your blood pressure regularly outside of the clinic? No   Are you following a low salt diet? No  Are your blood pressures ever more than 140 on the top number (systolic) OR more   than 90 on the bottom number (diastolic), for example 140/90? Patient does not check BP at home     Asthma Follow-Up  Was ACT completed today?  Yes        6/13/2024     3:19 PM   ACT Total Scores   ACT TOTAL SCORE (Goal Greater than or Equal to 20) 22   In the past 12 months, how many times did you visit the emergency room for your asthma without being admitted to the hospital? 0   In the past 12 months, how many times were you hospitalized overnight because of your asthma? 0        How many days per week do you miss taking your asthma controller medication?  0  Please describe any recent triggers for your asthma: smoke and pollens  Have you had any Emergency Room Visits, Urgent Care Visits, or Hospital Admissions since your last office visit?  No        6/13/2024   General Health   How would you rate your overall physical health? (!) FAIR   Feel stress (tense, anxious, or unable to sleep) To some extent   (!) STRESS CONCERN      6/13/2024   Nutrition   Diet: Regular (no restrictions)         6/13/2024   Exercise   Days per week of moderate/strenous exercise 7 days         6/13/2024   Social Factors   Frequency of gathering with friends or relatives More than three times a week   Worry food won't last until get money to buy more No   Food not last or not have enough money for food? No   Do you have housing?  Yes   Are you worried about losing your housing? No   Lack of transportation? Yes   Unable to get utilities (heat,electricity)? No    (!) TRANSPORTATION CONCERN PRESENT      6/13/2024   Fall Risk   Fallen 2 or more times in the past year? No   Trouble with walking or balance? Yes           6/13/2024   Activities of Daily Living- Home Safety   Needs help with the following daily  activites Transportation    Shopping    Housework    Laundry   Safety concerns in the home None of the above         2024   Dental   Dentist two times every year? Yes         2024   Hearing Screening   Hearing concerns? None of the above         2024   Driving Risk Screening   Patient/family members have concerns about driving No         2024   General Alertness/Fatigue Screening   Have you been more tired than usual lately? (!) YES         2024   Urinary Incontinence Screening   Bothered by leaking urine in past 6 months Yes         2024   TB Screening   Were you born outside of the [place]? No               2024   Substance Use   Alcohol more than 3/day or more than 7/wk No   Do you have a current opioid prescription? (!) YES   How severe/bad is pain from 1 to 10? 3/10   Do you use any other substances recreationally? No          No data to display              Low Risk (0-3)  Moderate Risk (4-7)  High Risk (>8)  Social History     Tobacco Use    Smoking status: Former     Current packs/day: 0.00     Average packs/day: 2.0 packs/day for 14.0 years (27.9 ttl pk-yrs)     Types: Cigarettes     Start date: 1970     Quit date: 1977     Years since quittin.4     Passive exposure: Past    Smokeless tobacco: Never   Vaping Use    Vaping status: Never Used   Substance Use Topics    Alcohol use: Not Currently    Drug use: Never           3/28/2024   LAST FHS-7 RESULTS   1st degree relative breast or ovarian cancer Yes   Any relative bilateral breast cancer Yes   Any male have breast cancer No   Any ONE woman have BOTH breast AND ovarian cancer No   Any woman with breast cancer before 50yrs No   2 or more relatives with breast AND/OR ovarian cancer No   2 or more relatives with breast AND/OR bowel cancer No        Mammogram Screening - Mammogram every 1-2 years updated in Health Maintenance based on mutual decision making      History of abnormal Pap smear: No - age 65 or older  with adequate negative prior screening test results (3 consecutive negative cytology results, 2 consecutive negative cotesting results, or 2 consecutive negative HrHPV test results within 10 years, with the most recent test occurring within the recommended screening interval for the test used)        Latest Ref Rng & Units 6/20/2023     3:41 PM 8/27/2015     5:00 PM   PAP / HPV   PAP  Negative for Intraepithelial Lesion or Malignancy (NILM)     HPV 16 DNA Negative Negative     HPV 18 DNA Negative Negative     Other HR HPV Negative Negative     PAP-ABSTRACT   See Scanned Document           This result is from an external source.     ASCVD Risk   The 10-year ASCVD risk score (Lolly MARTINEZ, et al., 2019) is: 6.3%    Values used to calculate the score:      Age: 64 years      Sex: Female      Is Non- : No      Diabetic: No      Tobacco smoker: No      Systolic Blood Pressure: 108 mmHg      Is BP treated: Yes      HDL Cholesterol: 52 mg/dL      Total Cholesterol: 214 mg/dL          Reviewed and updated as needed this visit by Provider      Problems               Past Medical History:   Diagnosis Date    Asthma     Benign essential hypertension     Colon cancer (H) 2008    Osteopenia     2023 frax 7.5/0.6%    Post concussive syndrome     Thyroid nodule     4; repeat annual ultrsound     Past Surgical History:   Procedure Laterality Date    ARTHROPLASTY HIP ANTERIOR Right 4/29/2024    Procedure: Right Direct Anterior Total Hip Arthroplasty;  Surgeon: Kirill Jose MD;  Location: [facility] OR    DILATION AND CURETTAGE, OPERATIVE HYSTEROSCOPY, COMBINED N/A 12/13/2023    Procedure: HYSTEROSCOPY, WITH DILATION AND CURETTAGE OF UTERUS;  Surgeon: Bakari Lott MD;  Location: [facility] OR    IR PICC PLACEMENT > 5 YRS OF AGE  1/5/2022    SALPINGO-OOPHORECTOMY BILATERAL Bilateral 12/13/2023    Procedure: LAPAROSCOPIC SALPINGO-OOPHORECTOMY, BILATERAL and peritoneal biopsies;  Surgeon: Bakari Lott MD;  Location:  "[facility] OR     Current providers sharing in care for this patient include:  Patient Care Team:  Salma Masterson MD as PCP - General (Family Medicine)  Salma Masterson MD as Assigned PCP  Bakari Lott MD as Assigned OBGYN Provider    The following health maintenance items are reviewed in Epic and correct as of today:  Health Maintenance   Topic Date Due    COVID-19 Vaccine (7 - 2023-24 season) 12/07/2023    ASTHMA ACTION PLAN  03/02/2024    ASTHMA CONTROL TEST  12/13/2024    LIPID  04/22/2025    MEDICARE ANNUAL WELLNESS VISIT  06/13/2025    FALL RISK ASSESSMENT  06/13/2025    MAMMO SCREENING  03/28/2026    COLORECTAL CANCER SCREENING  02/24/2027    GLUCOSE  04/29/2027    DEXA  03/20/2028    ADVANCE CARE PLANNING  06/13/2029    DTAP/TDAP/TD IMMUNIZATION (3 - Td or Tdap) 07/07/2032    HEPATITIS C SCREENING  Completed    PHQ-2 (once per calendar year)  Completed    INFLUENZA VACCINE  Completed    Pneumococcal Vaccine: 65+ Years  Completed    ZOSTER IMMUNIZATION  Completed    RSV VACCINE (Pregnancy & 60+)  Completed    IPV IMMUNIZATION  Aged Out    HPV IMMUNIZATION  Aged Out    MENINGITIS IMMUNIZATION  Aged Out    RSV MONOCLONAL ANTIBODY  Aged Out    PAP  Discontinued         Review of Systems  Constitutional, HEENT, cardiovascular, pulmonary, gi and gu systems are negative, except as otherwise noted.     Objective    Exam  /76 (BP Location: Right arm, Patient Position: Sitting, Cuff Size: Adult Large)   Pulse 78   Temp 98.8  F (37.1  C) (Tympanic)   Resp 16   Ht 1.651 m (5' 5\")   Wt 99.6 kg (219 lb 8 oz)   SpO2 97%   BMI 36.53 kg/m     Estimated body mass index is 36.53 kg/m  as calculated from the following:    Height as of this encounter: 1.651 m (5' 5\").    Weight as of this encounter: 99.6 kg (219 lb 8 oz).    Physical Exam  GENERAL: alert, no distress, and over weight  EYES: Eyes grossly normal to inspection, PERRL and conjunctivae and sclerae normal  HENT: ear canals and TM's normal, nose and " mouth without ulcers or lesions  NECK: no adenopathy, no asymmetry, masses, or scars  RESP: lungs clear to auscultation - no rales, rhonchi or wheezes  BREAST: normal without masses, tenderness or nipple discharge and no palpable axillary masses or adenopathy  CV: regular rate and rhythm, normal S1 S2, no S3 or S4, no murmur, click or rub, no peripheral edema  ABDOMEN: soft, nontender, no hepatosplenomegaly, no masses and bowel sounds normal   (female): deferred; limited mobility with recent hip surgery; no vaginal concerns  MS: no gross musculoskeletal defects noted, no edema  SKIN: no suspicious lesions or rashes  NEURO: Normal strength and tone, mentation intact and speech normal  PSYCH: mentation appears normal, affect normal/bright        6/13/2024   Mini Cog   Clock Draw Score 2 Normal   3 Item Recall 2 objects recalled   Mini Cog Total Score 4              Signed Electronically by: Salma Rios MD

## 2024-06-13 NOTE — COMMUNITY RESOURCES LIST (ENGLISH)
June 13, 2024           YOUR PERSONALIZED LIST OF SERVICES & PROGRAMS               Medical Transportation, (NEMT)      Social Service of [place] - Neighbor to Neighbor Program  Phone: (611) 358-4342  Email: [email or web address]  Website: [email or web address]/older-adults/-services/neighbor-to-neighbor  Language: English  Hours: Mon 8:00 AM - 5:00 PM Tue 8:00 AM - 5:00 PM Wed 8:00 AM - 5:00 PM Thu 8:00 AM - 5:00 PM Fri 8:00 AM - 5:00 PM  Fee: Insurance, Self pay  Accessibility: Deaf or hard of hearing, Blind accommodation, Translation services    Expense Assistance      - Dislocated Worker/Adult [employer] Employment Program  Phone: (789) 804-7546  Email: joe@[employer]  Website: https://[employer]/services/employment-services/dislocated-worker-program/  Language: English, Emirati  Hours: Mon 8:00 AM - 4:30 PM Tue 8:00 AM - 4:30 PM Wed 8:00 AM - 4:30 PM Thu 8:00 AM - 4:30 PM Fri 8:00 AM - 4:30 PM  Fee: Free  Accessibility: Ada accessible    Coordination      [employer] - [employer] - Free or Low-cost Transportation  [address] [place] [address] (Distance: 9.6 miles)  Language: English  Fee: Free      [employer] - Dial-A-Ride  [address] [place] [address] (Distance: 11.3 miles)  Language: English  Fee: Self pay      Ride Transportation - How [employer] works  Phone: (569) 571-9475  Website: https://www.[employer]/members/shop-plans/[place]-health-care-programs/blueride-transportation  Language: English  Hours: Mon 8:00 AM - 5:00 PM Tue 8:00 AM - 5:00 PM Wed 8:00 AM - 5:00 PM Thu 8:00 AM - 5:00 PM Fri 8:00 AM - 5:00 PM               IMPORTANT NUMBERS & WEBSITES        Emergency Services  911  .   United Way  211 http://211unitedway.org  .   Poison Control  (844) 254-9304 http://[place]poison.org http://[place]poison.org  .     Suicide and Crisis Lifeline  988 http://988[facility] Allianceline.org  .   Childhelp Crest Child Abuse  Hotline  529.878.9957 http://Childhelphotline.org   .   National Sexual Assault Hotline  (315) 901-1457 (HOPE) http://Rainn.org   .     National Runaway Safeline  (492) 186-8171 (RUNAWAY) http://AdWhirl.QFPay  .   Pregnancy & Postpartum Support  Call/text 904-420-3715  [place]: http://ppsupport[place].org  [place]: http://psichapters.com/wi  .   Substance Abuse National Helpline ([facility])  552-991-HELP (3711) http://Findtreatment.gov   .                DISCLAIMER: These resources have been generated via the [employer]. [employer] does not endorse any service providers mentioned in this resource list. [employer] does not guarantee that the services mentioned in this resource list will be available to you or will improve your health or wellness.    [facility]

## 2024-06-14 ENCOUNTER — PATIENT OUTREACH (OUTPATIENT)
Dept: GASTROENTEROLOGY | Facility: CLINIC | Age: 64
End: 2024-06-14

## 2024-06-14 LAB — VIT D+METAB SERPL-MCNC: 40 NG/ML (ref 20–50)

## 2024-06-24 ENCOUNTER — MEDICAL CORRESPONDENCE (OUTPATIENT)
Dept: MRI IMAGING | Facility: HOSPITAL | Age: 64
End: 2024-06-24

## 2024-06-25 ENCOUNTER — MEDICAL CORRESPONDENCE (OUTPATIENT)
Dept: CT IMAGING | Facility: HOSPITAL | Age: 64
End: 2024-06-25

## 2024-07-09 ENCOUNTER — OFFICE VISIT (OUTPATIENT)
Dept: FAMILY MEDICINE | Facility: OTHER | Age: 64
End: 2024-07-09
Attending: NURSE PRACTITIONER
Payer: COMMERCIAL

## 2024-07-09 VITALS
DIASTOLIC BLOOD PRESSURE: 60 MMHG | OXYGEN SATURATION: 97 % | TEMPERATURE: 97.5 F | HEART RATE: 90 BPM | SYSTOLIC BLOOD PRESSURE: 120 MMHG

## 2024-07-09 DIAGNOSIS — H00.019 HORDEOLUM EXTERNUM, UNSPECIFIED LATERALITY: Primary | ICD-10-CM

## 2024-07-09 DIAGNOSIS — I10 ESSENTIAL HYPERTENSION: Chronic | ICD-10-CM

## 2024-07-09 PROCEDURE — G0463 HOSPITAL OUTPT CLINIC VISIT: HCPCS

## 2024-07-09 PROCEDURE — 99213 OFFICE O/P EST LOW 20 MIN: CPT | Mod: 24 | Performed by: NURSE PRACTITIONER

## 2024-07-09 RX ORDER — RAMIPRIL 10 MG/1
10 CAPSULE ORAL DAILY
Qty: 90 CAPSULE | Refills: 0 | Status: SHIPPED | OUTPATIENT
Start: 2024-07-09 | End: 2024-10-07

## 2024-07-09 RX ORDER — CALCIUM CARBONATE/VITAMIN D3 600MG-5MCG
TABLET ORAL
COMMUNITY
Start: 2024-07-06

## 2024-07-09 RX ORDER — ERYTHROMYCIN 5 MG/G
0.5 OINTMENT OPHTHALMIC 2 TIMES DAILY
Qty: 1 G | Refills: 0 | Status: SHIPPED | OUTPATIENT
Start: 2024-07-09

## 2024-07-09 NOTE — PATIENT INSTRUCTIONS
conservative management with warm compresses   ?Apply warm, moist compresses to affected area for 5 to 10 minutes four times a day to facilitate drainage.   ?Eyelid massage following compresses can also promote drainage.

## 2024-07-09 NOTE — PROGRESS NOTES
Assessment & Plan     Hordeolum externum, unspecified laterality  Concerns for infected stye to right eye   Ointment as prescribed  Discussed self care - see AVS  - erythromycin (ROMYCIN) 5 MG/GM ophthalmic ointment; Place 0.5 inches into the right eye 2 times daily    Essential hypertension  Blood pressure controlled today   Refilled medication   - ramipril (ALTACE) 10 MG capsule; Take 1 capsule (10 mg) by mouth daily            See Patient Instructions    No follow-ups on file.    Subjective   Rosa is a 64 year old, presenting for the following health issues:  Stye / Hordeolum Evaluation        7/9/2024    10:15 AM   Additional Questions   Roomed by MIROSLAVA Link CMA   Accompanied by Self         7/9/2024    10:15 AM   Patient Reported Additional Medications   Patient reports taking the following new medications None     HPI     Eye(s) Problem  Onset/Duration: 4 days  Description:   Location: Bilateral  Pain: YES  Redness: YES  Accompanying Signs & Symptoms:  Discharge/mattering: No  Swelling: No  Visual changes: YES  Fever: No  Nasal Congestion: No  Bothered by bright lights: No  History:  Trauma: No  Foreign body exposure: No  Wearing contacts: No  Precipitating or alleviating factors:   Therapies tried and outcome: did use visine        Review of Systems  CONSTITUTIONAL: NEGATIVE for fever, chills, change in weight  EYES: right lower eye lid red and swollen and left lower lid swollen area  ENT/MOUTH: right pain and discomfort around right eye socket   RESP:Hx asthma  CV: NEGATIVE for chest pain, palpitations or peripheral edema      Objective    /60   Pulse 90   Temp 97.5  F (36.4  C) (Tympanic)   SpO2 97%   There is no height or weight on file to calculate BMI.  Physical Exam   GENERAL: alert and no distress  EYES: eyelids- right lower lid lesion erythema swollen with whit spot in the middle, left lower lid lesion raised area no erythema   HENT: normal cephalic/atraumatic, both ears: clear  effusion, and sinuses: frontal tenderness on right  RESP: lungs clear to auscultation - no rales, rhonchi or wheezes  CV: regular rate and rhythm, normal S1 S2, no S3 or S4, no murmur, click or rub, no peripheral edema    No labs today         Signed Electronically by: JOSUE Mena CNP

## 2024-07-10 ENCOUNTER — HOSPITAL ENCOUNTER (OUTPATIENT)
Dept: CT IMAGING | Facility: HOSPITAL | Age: 64
Discharge: HOME | End: 2024-07-10
Attending: STUDENT IN AN ORGANIZED HEALTH CARE EDUCATION/TRAINING PROGRAM
Payer: COMMERCIAL

## 2024-07-10 ENCOUNTER — HOSPITAL ENCOUNTER (OUTPATIENT)
Dept: MRI IMAGING | Facility: HOSPITAL | Age: 64
Discharge: HOME | End: 2024-07-10
Attending: STUDENT IN AN ORGANIZED HEALTH CARE EDUCATION/TRAINING PROGRAM
Payer: COMMERCIAL

## 2024-07-10 DIAGNOSIS — M53.3 SACROILIAC JOINT PAIN: ICD-10-CM

## 2024-07-10 DIAGNOSIS — M54.50 LOW BACK PAIN: ICD-10-CM

## 2024-07-10 DIAGNOSIS — M46.1 SACROILIITIS (H): ICD-10-CM

## 2024-07-10 PROCEDURE — 72148 MRI LUMBAR SPINE W/O DYE: CPT

## 2024-07-10 PROCEDURE — 72192 CT PELVIS W/O DYE: CPT

## 2024-07-16 ENCOUNTER — TRANSFERRED RECORDS (OUTPATIENT)
Dept: HEALTH INFORMATION MANAGEMENT | Facility: CLINIC | Age: 64
End: 2024-07-16

## 2024-08-06 DIAGNOSIS — J45.40 MODERATE PERSISTENT ASTHMA WITHOUT COMPLICATION: ICD-10-CM

## 2024-08-06 RX ORDER — THEOPHYLLINE 400 MG/1
400 TABLET, EXTENDED RELEASE ORAL DAILY
Qty: 90 TABLET | Refills: 0 | Status: SHIPPED | OUTPATIENT
Start: 2024-08-06

## 2024-08-06 NOTE — TELEPHONE ENCOUNTER
Urvashihyl      Last Written Prescription Date:  4/22/2024  Last Fill Quantity: 90,   # refills: 0  Last Office Visit: 7/9/2024  Future Office visit:       Routing refill request to provider for review/approval because:  Drug not on the [facility], P or [facility] refill protocol or controlled substance

## 2024-08-08 ENCOUNTER — TRANSFERRED RECORDS (OUTPATIENT)
Dept: HEALTH INFORMATION MANAGEMENT | Facility: CLINIC | Age: 64
End: 2024-08-08

## 2024-08-08 DIAGNOSIS — Z96.641 STATUS POST TOTAL REPLACEMENT OF RIGHT HIP: ICD-10-CM

## 2024-08-08 RX ORDER — ASPIRIN 81 MG/1
81 TABLET, COATED ORAL 2 TIMES DAILY
Qty: 60 TABLET | Refills: 1 | Status: SHIPPED | OUTPATIENT
Start: 2024-08-08 | End: 2024-10-07

## 2024-08-09 NOTE — PROGRESS NOTES
Otolaryngology Consultation    Patient: Rosa Alcocer  : 1960    Patient presents with:  Ent Problem: Thyroid nodule Salma Masterson MD referring      HPI:  Rosa Alcocer is a 64 year old female seen today at the request of Salma Masterson  for evaluation of a thyroid nodule.  History of MS and moderate persistent asthma.  This nodule was found incidentally.   The patient denies dysphonia, dysphagia or any other compressive symptoms.  There is no family history of thyroid cancer, and no personal history of head or neck irradiation.    She notes rare choking, occasional cough  No hemoptysis  Hx asthma, uses albuterol daily  No weight loss  No pharyngitis    The patient denies tremor, hair loss   No tobacco use      The thyroid ultrasound was personally reviewed.  Dated 2024.  There is a 2.1 upper pole right thyroid nodule T RADS 4.  Previously measured 2 cm.  In the lower right lobe there is another 1.1 cm stable T RADS 3 nodule.  Nodules stable from 22    Lower left lobe there is a subcentimeter nodule and a 1.1 cm T RADS 3 nodule. FNA was performed of the right upper lobe nodule on 6/10/2024 and was benign    Thyroid labs were reviewed.  TSH 2023 was 1.28, 1.22 at prior draw    Recent hip replacement with postop hip dislocation and upcoming need for additional orthopedic surgery.    Current Outpatient Rx   Medication Sig Dispense Refill    albuterol (PROAIR HFA/PROVENTIL HFA/VENTOLIN HFA) 108 (90 Base) MCG/ACT inhaler Inhale 2 puffs into the lungs every 4 hours as needed for shortness of breath or wheezing 18 g 3    ALEVE 220 MG tablet Take 220 mg by mouth daily as needed (pain) (averages one time weekly) (Patient not taking: Reported on 2024)      aspirin (ASPIRIN EC ADULT LOW DOSE) 81 MG EC tablet TAKE 1 TABLET BY MOUTH 2 TIMES DAILY 60 tablet 1    CALCIUM + VITAMIN D3 600-5 MG-MCG TABS       Calcium-Vitamin D 600-5 MG-MCG TABS Take 1 tablet by mouth every morning 90 tablet 1     cetirizine (ZYRTEC) 10 MG tablet Take 1 tablet by mouth every morning      cholecalciferol (VITAMIN D3) 25 mcg (1000 units) capsule Take 1 capsule by mouth daily      erythromycin (ROMYCIN) 5 MG/GM ophthalmic ointment Place 0.5 inches into the right eye 2 times daily 1 g 0    fish oil-omega-3 fatty acids 1000 MG capsule Take 1 g by mouth daily      folic acid (FOLVITE) 400 MCG tablet Take 400 mcg by mouth every morning      HYDROcodone-acetaminophen (NORCO) 5-325 MG tablet Take 1-2 tablets by mouth every 4 hours as needed for moderate to severe pain 45 tablet 0    Lutein 10 MG TABS Take 20 mg by mouth every morning      magnesium 100 MG CAPS Take 1 capsule by mouth at bedtime (for sleep)      mometasone (NASONEX) 50 MCG/ACT nasal spray Spray 2 sprays into both nostrils daily as needed (allergies) (OTC unscented)      Probiotic Product (PROBIOTIC PO) Take 1 capsule by mouth daily      ramipril (ALTACE) 10 MG capsule Take 1 capsule (10 mg) by mouth daily 90 capsule 0    theophylline (UNIPHYL) 400 MG 24 hr tablet Take 1 tablet (400 mg) by mouth daily Takes between 3-6 PM daily 90 tablet 0    traZODone (DESYREL) 100 MG tablet Take 1 tablet (100 mg) by mouth at bedtime 90 tablet 1    VITAMIN A PO Take 1 capsule by mouth every morning      zinc 50 MG TABS Take 1 tablet by mouth daily (will take an extra dose as needed for cold symptoms)         Allergies: Codeine, Fentanyl, Meloxicam, No clinical screening - see comments, Prochlorperazine, Shellfish allergy, Amlodipine, Cyclobenzaprine, Losartan, Fluticasone, Oxycodone, and Budesonide-formoterol fumarate     Past Medical History:   Diagnosis Date    Asthma     Benign essential hypertension     Colon cancer (H) 2008    Osteopenia     2023 frax 7.5/0.6%    Post concussive syndrome     Thyroid nodule     4; repeat annual ultrsound       Past Surgical History:   Procedure Laterality Date    ARTHROPLASTY HIP ANTERIOR Right 4/29/2024    Procedure: Right Direct Anterior Total  "Hip Arthroplasty;  Surgeon: Kirill Jose MD;  Location: [facility]    DILATION AND CURETTAGE, OPERATIVE HYSTEROSCOPY, COMBINED N/A 2023    Procedure: HYSTEROSCOPY, WITH DILATION AND CURETTAGE OF UTERUS;  Surgeon: Bakari Lott MD;  Location: [facility]    IR PICC PLACEMENT > 5 YRS OF AGE  2022    SALPINGO-OOPHORECTOMY BILATERAL Bilateral 2023    Procedure: LAPAROSCOPIC SALPINGO-OOPHORECTOMY, BILATERAL and peritoneal biopsies;  Surgeon: Bakari Lott MD;  Location: [facility]       ENT family history reviewed    Social History     Tobacco Use    Smoking status: Former     Current packs/day: 0.00     Average packs/day: 2.0 packs/day for 14.0 years (27.9 ttl pk-yrs)     Types: Cigarettes     Start date: 1970     Quit date: 1977     Years since quittin.6     Passive exposure: Past    Smokeless tobacco: Never   Vaping Use    Vaping status: Never Used   Substance Use Topics    Alcohol use: Not Currently    Drug use: Never       Review of Systems  ROS: 10 point ROS neg other than the symptoms noted above in the HPI and itchy eyes joint pain and rhinorrhea    Physical Exam  /64 (Cuff Size: Adult Regular)   Pulse 87   Temp 98.9  F (37.2  C) (Tympanic)   Ht 1.651 m (5' 5\")   Wt 99.3 kg (219 lb)   SpO2 97%   BMI 36.44 kg/m      General - The patient is well nourished and well developed, and appears to have good nutritional status.  Alert and oriented to person and place, answers questions and cooperates with examination appropriately.   Walks with a cane from recent hip surgery  Head and Face - Normocephalic and atraumatic, with no gross asymmetry noted.  The facial nerve is intact, with strong symmetric movements.  Voice and Breathing - The patient was breathing comfortably without the use of accessory muscles. There was no wheezing, stridor, or stertor.  The patients voice was clear and strong, and had appropriate pitch and quality.  Slight speech impediment/lisp  Ears -The external auditory " canals are patent, the tympanic membranes are intact without effusion, retraction or mass.  Bony landmarks are intact.  Eyes - Extraocular movements intact, and the pupils were reactive to light.  Sclera were not icteric or injected, conjunctiva were pink and moist.  Mouth - Examination of the oral cavity showed pink, healthy oral mucosa. No lesions or ulcerations noted.  The tongue was mobile and midline, and the dentition were in good condition.    Throat - The walls of the oropharynx were smooth, pink, moist, symmetric, and had no lesions or ulcerations.  The tonsillar pillars and soft palate were symmetric.  The uvula was midline on elevation.  Symmetric grade 2 tonsils no mass  Neck - Normal midline excursion of the laryngotracheal complex during swallowing.  Full range of motion on passive movement.  Palpation of the occipital, submental, submandibular, internal jugular chain, and supraclavicular nodes did not demonstrate any abnormal lymph nodes or masses.  Palpation of the thyroid was irregular with 2.0 cm right nodule, mobile, multiple bilateral mobile nontender nodules <2cm.  The trachea was mobile and midline.  Nose - External contour is symmetric, no gross deflection or scars.  Nasal mucosa is pink and moist with no abnormal mucus.  The septum was intact, turbinates of normal size and position.  No polyps, masses, or purulence noted on examination.  DNS left, right ITH     Attempts at mirror laryngoscopy were not possible due to gag reflex.  Therefore I proceeded with a fiberoptic examination after informed consent.  First I sprayed both sides of the nose with a mixture of lidocaine and neosynephrine.  I then passed the scope through the nasal cavity.  The nasal cavity was unremarkable.  The nasopharynx was mucosally covered and symmetric.  The eustachian tube openings were unobstructed.  Going further, the base of tongue was free of lesions, and the vallecula was open.  The epiglottis was smooth and  mucosally covered.  The supraglottic larynx was then clearly visualized.  The vocal cords moved smoothly and symmetrically.  The pyriform sinuses were open and without amirah mass or pooling of secretions, and the limited view of the postcricoid region did not show any lesions.  The patient tolerated the procedure well.      Impression and Plan- Rosa Alcocer is a 64 year old female with:    ICD-10-CM    1. Dysphagia, unspecified type  R13.10       2. bilateral thyroid nodules  E04.1 lidocaine 2%-oxymetazoline 0.025% nasal solution 2 spray     Adult ENT  Referral      3. MS (multiple sclerosis) (H)  G35       4. Moderate persistent asthma without complication  J45.40             Right 2.1 cm nodule, stable from prior   imaging and benign FNA  Remainder of smaller nodules stable from prior ultrasound  Asymptomatic    US thyroid 2 years by Dr. Masterson  Follow-up with other ENT concerns    The remainder of today's visit was spent discussing the options in this situation.  Also, I educated the patient about how common thyroid nodules are, and the fact that there is actually a low probability of this being a malignancy.  We also discussed the importance of follow up due to the possibility of false negatives with testing.       Thyroid anatomy was discussed, and thyroid surgery was briefly discussed.      Olena De Luna D.O.  Otolaryngology/Head and Neck Surgery  Allergy

## 2024-08-12 ENCOUNTER — OFFICE VISIT (OUTPATIENT)
Dept: OTOLARYNGOLOGY | Facility: OTHER | Age: 64
End: 2024-08-12
Attending: OTOLARYNGOLOGY
Payer: COMMERCIAL

## 2024-08-12 VITALS
BODY MASS INDEX: 36.49 KG/M2 | OXYGEN SATURATION: 97 % | HEART RATE: 87 BPM | HEIGHT: 65 IN | TEMPERATURE: 98.9 F | WEIGHT: 219 LBS | DIASTOLIC BLOOD PRESSURE: 64 MMHG | SYSTOLIC BLOOD PRESSURE: 122 MMHG

## 2024-08-12 DIAGNOSIS — E04.1 THYROID NODULE: Chronic | ICD-10-CM

## 2024-08-12 DIAGNOSIS — J45.40 MODERATE PERSISTENT ASTHMA WITHOUT COMPLICATION: ICD-10-CM

## 2024-08-12 DIAGNOSIS — G35 MS (MULTIPLE SCLEROSIS) (H): ICD-10-CM

## 2024-08-12 DIAGNOSIS — R13.10 DYSPHAGIA, UNSPECIFIED TYPE: Primary | ICD-10-CM

## 2024-08-12 PROCEDURE — G0463 HOSPITAL OUTPT CLINIC VISIT: HCPCS | Mod: 25

## 2024-08-12 PROCEDURE — 99203 OFFICE O/P NEW LOW 30 MIN: CPT | Mod: 25 | Performed by: OTOLARYNGOLOGY

## 2024-08-12 PROCEDURE — 31575 DIAGNOSTIC LARYNGOSCOPY: CPT | Performed by: OTOLARYNGOLOGY

## 2024-08-12 ASSESSMENT — PAIN SCALES - GENERAL: PAINLEVEL: SEVERE PAIN (7)

## 2024-08-12 NOTE — LETTER
2024      Rosa Alcocer  [address]   [address]  [facility] [address]      Dear Colleague,    Thank you for referring your patient, Rosa Alcocer, to the [facility]. Please see a copy of my visit note below.      Otolaryngology Consultation    Patient: Rosa Alcocer  : 1960    Patient presents with:  Ent Problem: Thyroid nodule Salma Masterson MD referring      HPI:  Rosa Alcocer is a 64 year old female seen today at the request of Salma Masterson  for evaluation of a thyroid nodule.  History of MS and moderate persistent asthma.  This nodule was found incidentally.   The patient denies dysphonia, dysphagia or any other compressive symptoms.  There is no family history of thyroid cancer, and no personal history of head or neck irradiation.    She notes rare choking, occasional cough  No hemoptysis  Hx asthma, uses albuterol daily  No weight loss  No pharyngitis    The patient denies tremor, hair loss   No tobacco use      The thyroid ultrasound was personally reviewed.  Dated 2024.  There is a 2.1 upper pole right thyroid nodule T RADS 4.  Previously measured 2 cm.  In the lower right lobe there is another 1.1 cm stable T RADS 3 nodule.  Nodules stable from 22    Lower left lobe there is a subcentimeter nodule and a 1.1 cm T RADS 3 nodule. FNA was performed of the right upper lobe nodule on 6/10/2024 and was benign    Thyroid labs were reviewed.  TSH 2023 was 1.28, 1.22 at prior draw    Recent hip replacement with postop hip dislocation and upcoming need for additional orthopedic surgery.    Current Outpatient Rx   Medication Sig Dispense Refill     albuterol (PROAIR HFA/PROVENTIL HFA/VENTOLIN HFA) 108 (90 Base) MCG/ACT inhaler Inhale 2 puffs into the lungs every 4 hours as needed for shortness of breath or wheezing 18 g 3     ALEVE 220 MG tablet Take 220 mg by mouth daily as needed (pain) (averages one time weekly) (Patient not taking: Reported on  7/9/2024)       aspirin (ASPIRIN EC ADULT LOW DOSE) 81 MG EC tablet TAKE 1 TABLET BY MOUTH 2 TIMES DAILY 60 tablet 1     CALCIUM + VITAMIN D3 600-5 MG-MCG TABS        Calcium-Vitamin D 600-5 MG-MCG TABS Take 1 tablet by mouth every morning 90 tablet 1     cetirizine (ZYRTEC) 10 MG tablet Take 1 tablet by mouth every morning       cholecalciferol (VITAMIN D3) 25 mcg (1000 units) capsule Take 1 capsule by mouth daily       erythromycin (ROMYCIN) 5 MG/GM ophthalmic ointment Place 0.5 inches into the right eye 2 times daily 1 g 0     fish oil-omega-3 fatty acids 1000 MG capsule Take 1 g by mouth daily       folic acid (FOLVITE) 400 MCG tablet Take 400 mcg by mouth every morning       HYDROcodone-acetaminophen (NORCO) 5-325 MG tablet Take 1-2 tablets by mouth every 4 hours as needed for moderate to severe pain 45 tablet 0     Lutein 10 MG TABS Take 20 mg by mouth every morning       magnesium 100 MG CAPS Take 1 capsule by mouth at bedtime (for sleep)       mometasone (NASONEX) 50 MCG/ACT nasal spray Spray 2 sprays into both nostrils daily as needed (allergies) (OTC unscented)       Probiotic Product (PROBIOTIC PO) Take 1 capsule by mouth daily       ramipril (ALTACE) 10 MG capsule Take 1 capsule (10 mg) by mouth daily 90 capsule 0     theophylline (UNIPHYL) 400 MG 24 hr tablet Take 1 tablet (400 mg) by mouth daily Takes between 3-6 PM daily 90 tablet 0     traZODone (DESYREL) 100 MG tablet Take 1 tablet (100 mg) by mouth at bedtime 90 tablet 1     VITAMIN A PO Take 1 capsule by mouth every morning       zinc 50 MG TABS Take 1 tablet by mouth daily (will take an extra dose as needed for cold symptoms)         Allergies: Codeine, Fentanyl, Meloxicam, No clinical screening - see comments, Prochlorperazine, Shellfish allergy, Amlodipine, Cyclobenzaprine, Losartan, Fluticasone, Oxycodone, and Budesonide-formoterol fumarate     Past Medical History:   Diagnosis Date     Asthma      Benign essential hypertension      Colon  "cancer (H) 2008     Osteopenia      frax 7.5/0.6%     Post concussive syndrome      Thyroid nodule     4; repeat annual ultrsound       Past Surgical History:   Procedure Laterality Date     ARTHROPLASTY HIP ANTERIOR Right 2024    Procedure: Right Direct Anterior Total Hip Arthroplasty;  Surgeon: Kirill Jose MD;  Location: [facility]     DILATION AND CURETTAGE, OPERATIVE HYSTEROSCOPY, COMBINED N/A 2023    Procedure: HYSTEROSCOPY, WITH DILATION AND CURETTAGE OF UTERUS;  Surgeon: Bakari Lott MD;  Location: [facility]     IR PICC PLACEMENT > 5 YRS OF AGE  2022     SALPINGO-OOPHORECTOMY BILATERAL Bilateral 2023    Procedure: LAPAROSCOPIC SALPINGO-OOPHORECTOMY, BILATERAL and peritoneal biopsies;  Surgeon: Bakari Lott MD;  Location: [facility]       ENT family history reviewed    Social History     Tobacco Use     Smoking status: Former     Current packs/day: 0.00     Average packs/day: 2.0 packs/day for 14.0 years (27.9 ttl pk-yrs)     Types: Cigarettes     Start date: 1970     Quit date: 1977     Years since quittin.6     Passive exposure: Past     Smokeless tobacco: Never   Vaping Use     Vaping status: Never Used   Substance Use Topics     Alcohol use: Not Currently     Drug use: Never       Review of Systems  ROS: 10 point ROS neg other than the symptoms noted above in the HPI and itchy eyes joint pain and rhinorrhea    Physical Exam  /64 (Cuff Size: Adult Regular)   Pulse 87   Temp 98.9  F (37.2  C) (Tympanic)   Ht 1.651 m (5' 5\")   Wt 99.3 kg (219 lb)   SpO2 97%   BMI 36.44 kg/m      General - The patient is well nourished and well developed, and appears to have good nutritional status.  Alert and oriented to person and place, answers questions and cooperates with examination appropriately.   Walks with a cane from recent hip surgery  Head and Face - Normocephalic and atraumatic, with no gross asymmetry noted.  The facial nerve is intact, with strong symmetric " movements.  Voice and Breathing - The patient was breathing comfortably without the use of accessory muscles. There was no wheezing, stridor, or stertor.  The patients voice was clear and strong, and had appropriate pitch and quality.  Slight speech impediment/lisp  Ears -The external auditory canals are patent, the tympanic membranes are intact without effusion, retraction or mass.  Bony landmarks are intact.  Eyes - Extraocular movements intact, and the pupils were reactive to light.  Sclera were not icteric or injected, conjunctiva were pink and moist.  Mouth - Examination of the oral cavity showed pink, healthy oral mucosa. No lesions or ulcerations noted.  The tongue was mobile and midline, and the dentition were in good condition.    Throat - The walls of the oropharynx were smooth, pink, moist, symmetric, and had no lesions or ulcerations.  The tonsillar pillars and soft palate were symmetric.  The uvula was midline on elevation.  Symmetric grade 2 tonsils no mass  Neck - Normal midline excursion of the laryngotracheal complex during swallowing.  Full range of motion on passive movement.  Palpation of the occipital, submental, submandibular, internal jugular chain, and supraclavicular nodes did not demonstrate any abnormal lymph nodes or masses.  Palpation of the thyroid was irregular with 2.0 cm right nodule, mobile, multiple bilateral mobile nontender nodules <2cm.  The trachea was mobile and midline.  Nose - External contour is symmetric, no gross deflection or scars.  Nasal mucosa is pink and moist with no abnormal mucus.  The septum was intact, turbinates of normal size and position.  No polyps, masses, or purulence noted on examination.  DNS left, right ITH     Attempts at mirror laryngoscopy were not possible due to gag reflex.  Therefore I proceeded with a fiberoptic examination after informed consent.  First I sprayed both sides of the nose with a mixture of lidocaine and neosynephrine.  I then  passed the scope through the nasal cavity.  The nasal cavity was unremarkable.  The nasopharynx was mucosally covered and symmetric.  The eustachian tube openings were unobstructed.  Going further, the base of tongue was free of lesions, and the vallecula was open.  The epiglottis was smooth and mucosally covered.  The supraglottic larynx was then clearly visualized.  The vocal cords moved smoothly and symmetrically.  The pyriform sinuses were open and without amirah mass or pooling of secretions, and the limited view of the postcricoid region did not show any lesions.  The patient tolerated the procedure well.      Impression and Plan- Rosa Alcocer is a 64 year old female with:    ICD-10-CM    1. Dysphagia, unspecified type  R13.10       2. bilateral thyroid nodules  E04.1 lidocaine 2%-oxymetazoline 0.025% nasal solution 2 spray     Adult ENT  Referral      3. MS (multiple sclerosis) (H)  G35       4. Moderate persistent asthma without complication  J45.40             Right 2.1 cm nodule, stable from prior   imaging and benign FNA  Remainder of smaller nodules stable from prior ultrasound  Asymptomatic    US thyroid 2 years by Dr. Masterson  Follow-up with other ENT concerns    The remainder of today's visit was spent discussing the options in this situation.  Also, I educated the patient about how common thyroid nodules are, and the fact that there is actually a low probability of this being a malignancy.  We also discussed the importance of follow up due to the possibility of false negatives with testing.       Thyroid anatomy was discussed, and thyroid surgery was briefly discussed.      Olena De Luan D.O.  Otolaryngology/Head and Neck Surgery  Allergy          Again, thank you for allowing me to participate in the care of your patient.        Sincerely,        Olena De Luna MD

## 2024-08-12 NOTE — PATIENT INSTRUCTIONS
Thank you for allowing Dr. De Luna and our ENT team to participate in your care.  If your medications are too expensive, please give the nurse a call.  We can possibly change this medication.  If you have a scheduling or an appointment question please contact our Health Unit Coordinator at their direct line [phone].   ALL nursing questions or concerns can be directed to your ENT nurse, Dominik, at: [phone]    Nodules stable bilaterally  Benign right nodule biopsy    US thyroid 2 years by Dr. Masterson

## 2024-08-20 DIAGNOSIS — J45.40 MODERATE PERSISTENT ASTHMA WITHOUT COMPLICATION: ICD-10-CM

## 2024-08-20 RX ORDER — ALBUTEROL SULFATE 90 UG/1
2 AEROSOL, METERED RESPIRATORY (INHALATION) EVERY 4 HOURS PRN
Qty: 18 G | Refills: 5 | Status: SHIPPED | OUTPATIENT
Start: 2024-08-20

## 2024-08-20 NOTE — TELEPHONE ENCOUNTER
Ventolin      Last Written Prescription Date:  5/3/23  Last Fill Quantity: 18g,   # refills: 3  Last Office Visit: 7/9/24  Future Office visit:       Routing refill request to provider for review/approval because:

## 2024-09-07 DIAGNOSIS — Z98.890 POST-OPERATIVE STATE: ICD-10-CM

## 2024-09-09 RX ORDER — METHOCARBAMOL 750 MG/1
TABLET, FILM COATED ORAL
Qty: 30 TABLET | Refills: 0 | OUTPATIENT
Start: 2024-09-09

## 2024-09-09 NOTE — TELEPHONE ENCOUNTER
Methocarbamol 750 MG      Last Written Prescription Date:  not listed on current medication list  Last Fill Quantity: n/a,   # refills: n/a  Last Office Visit: 07/09/24  Future Office visit:       Routing refill request to provider for review/approval because:  Drug not active on patient's medication list

## 2024-09-22 ENCOUNTER — HOSPITAL ENCOUNTER (EMERGENCY)
Facility: HOSPITAL | Age: 64
Discharge: HOME | End: 2024-09-22
Attending: PHYSICIAN ASSISTANT | Admitting: PHYSICIAN ASSISTANT
Payer: COMMERCIAL

## 2024-09-22 VITALS
OXYGEN SATURATION: 99 % | DIASTOLIC BLOOD PRESSURE: 83 MMHG | HEART RATE: 91 BPM | RESPIRATION RATE: 20 BRPM | TEMPERATURE: 99 F | SYSTOLIC BLOOD PRESSURE: 123 MMHG

## 2024-09-22 DIAGNOSIS — U07.1 COVID-19 VIRUS INFECTION: ICD-10-CM

## 2024-09-22 DIAGNOSIS — J45.901 EXACERBATION OF ASTHMA, UNSPECIFIED ASTHMA SEVERITY, UNSPECIFIED WHETHER PERSISTENT: Primary | ICD-10-CM

## 2024-09-22 DIAGNOSIS — J45.901 ASTHMA EXACERBATION: ICD-10-CM

## 2024-09-22 PROCEDURE — 94640 AIRWAY INHALATION TREATMENT: CPT

## 2024-09-22 PROCEDURE — 999N000157 HC STATISTIC RCP TIME EA 10 MIN

## 2024-09-22 PROCEDURE — G0463 HOSPITAL OUTPT CLINIC VISIT: HCPCS | Mod: 25

## 2024-09-22 PROCEDURE — 250N000009 HC RX 250: Performed by: PHYSICIAN ASSISTANT

## 2024-09-22 PROCEDURE — 99213 OFFICE O/P EST LOW 20 MIN: CPT | Performed by: PHYSICIAN ASSISTANT

## 2024-09-22 RX ORDER — IPRATROPIUM BROMIDE AND ALBUTEROL SULFATE 2.5; .5 MG/3ML; MG/3ML
1 SOLUTION RESPIRATORY (INHALATION) EVERY 6 HOURS PRN
Qty: 90 ML | Refills: 0 | Status: SHIPPED | OUTPATIENT
Start: 2024-09-22 | End: 2024-10-03

## 2024-09-22 RX ORDER — IPRATROPIUM BROMIDE AND ALBUTEROL SULFATE 2.5; .5 MG/3ML; MG/3ML
3 SOLUTION RESPIRATORY (INHALATION) ONCE
Status: COMPLETED | OUTPATIENT
Start: 2024-09-22 | End: 2024-09-22

## 2024-09-22 RX ORDER — PREDNISONE 20 MG/1
TABLET ORAL
Qty: 10 TABLET | Refills: 0 | Status: SHIPPED | OUTPATIENT
Start: 2024-09-22 | End: 2024-10-03

## 2024-09-22 RX ADMIN — IPRATROPIUM BROMIDE AND ALBUTEROL SULFATE 3 ML: .5; 3 SOLUTION RESPIRATORY (INHALATION) at 12:05

## 2024-09-22 ASSESSMENT — COLUMBIA-SUICIDE SEVERITY RATING SCALE - C-SSRS
6. HAVE YOU EVER DONE ANYTHING, STARTED TO DO ANYTHING, OR PREPARED TO DO ANYTHING TO END YOUR LIFE?: NO
1. IN THE PAST MONTH, HAVE YOU WISHED YOU WERE DEAD OR WISHED YOU COULD GO TO SLEEP AND NOT WAKE UP?: NO
2. HAVE YOU ACTUALLY HAD ANY THOUGHTS OF KILLING YOURSELF IN THE PAST MONTH?: NO

## 2024-09-22 ASSESSMENT — ENCOUNTER SYMPTOMS
COUGH: 1
SORE THROAT: 1
SHORTNESS OF BREATH: 1

## 2024-09-22 ASSESSMENT — ACTIVITIES OF DAILY LIVING (ADL): ADLS_ACUITY_SCORE: 38

## 2024-09-22 NOTE — ED TRIAGE NOTES
LEYLA Cornejo  assessed patient in triage and determined patient Urgent Care appropriate. Will be seen in Urgent Care.

## 2024-09-22 NOTE — ED TRIAGE NOTES
"Pt presents today with c/o positive for covid. Pt took 2 at home test both were positive for covid. \"Its everywhere that's why I'm here. It in my ears, throat, my chest\"         "

## 2024-09-22 NOTE — ED PROVIDER NOTES
History     Chief Complaint   Patient presents with    Covid Concern     HPI  Rosa Alcocer is a 64 year old female who presents to urgent care for shortness of breath.  Patient states that she tested positive for COVID yesterday.  She also states that she has a history of asthma and has been short of breath.  She states that she has an albuterol inhaler at home which she has been using but with limited improvement of her shortness of breath.  Patient is also having otalgia, sore throat, cough.    Allergies:  Allergies   Allergen Reactions    Codeine Shortness Of Breath and Swelling    Fentanyl Other (See Comments)     Bradycardia & Hypotension    Meloxicam Other (See Comments)     Hypersensitive, SOB, insomnia, HTN    No Clinical Screening - See Comments Other (See Comments) and Shortness Of Breath     MOLD, GRASSES. Some cleaning products, perfumes, tar,  Skunk scent    Prochlorperazine Hives, Shortness Of Breath and Swelling     COMPAZINE      Shellfish Allergy Hives, Shortness Of Breath and Swelling    Amlodipine Other (See Comments)     Hip pain and edema.    Cyclobenzaprine Other (See Comments)     Jittery, leg cramps, insomnia,     Losartan Cough, Itching and Muscle Pain (Myalgia)    Fluticasone Swelling    Oxycodone Headache and Nausea    Budesonide-Formoterol Fumarate Other (See Comments) and Palpitations     Leg pains       Problem List:    Patient Active Problem List    Diagnosis Date Noted    S/P total right hip arthroplasty 04/29/2024     Priority: Medium    Osteopenia 03/02/2023     Priority: Medium    Esophagitis 10/13/2022     Priority: Medium    Essential hypertension 10/13/2022     Priority: Medium    Hyperlipidemia 10/13/2022     Priority: Medium    MS (multiple sclerosis) (H) 10/13/2022     Priority: Medium     history of multiple sclerosis, but has been in remission for years, not on current treatment.        Thyroid nodule 10/13/2022     Priority: Medium     Jan 2022 - stable nodules with  recommendations to recheck in 1 year.        Class 2 obesity due to excess calories without serious comorbidity with body mass index (BMI) of 36.0 to 36.9 in adult 10/13/2022     Priority: Medium    Lumbar spondylosis 06/08/2022     Priority: Medium     S/p Left Radiofrequency percutaneous neurotomy of the L3, L4 medial branches and the L5 dorsal ramus, to cover the facet joints of L4/L5 and L5/S1.         History of osteomyelitis 01/06/2022     Priority: Medium     Of jaw, secondary to tooth abscess. S/p IV abx and PICC. Had 3 surgeries and lost 7 teeth. Follow up with ID as needed.      Post concussive syndrome 12/21/2020     Priority: Medium     history of a fall at work on12/26/2019, and sustained a traumatic brain injury and has chronic headaches and postconcussive syndrome. She was taking the garbage out and hit a patch of ice and hit her buttock and her head. Did do occupational therapy and see neurooptics and had prism glasses.        Vasomotor rhinitis 06/29/2018     Priority: Medium    Diverticulosis 07/25/2017     Priority: Medium    Personal history of colonic polyps 07/25/2017     Priority: Medium     2/24/22 - rpt in five years       Dysphonia 10/21/2014     Priority: Medium    Moderate persistent asthma without complication 06/18/2012     Priority: Medium     on theophylline. She was seen by Dr. Moreno for her asthma in the past and this was recommended by him. Her symptoms are tirggered by heat and humidity. She uses ventolin about once per day and feels symptoms.        Tubular adenoma 03/09/2009     Priority: Medium     On colonoscopy 2009.  Repeat colonoscopy in 1 year per GI Recommendations (see note).          Past Medical History:    Past Medical History:   Diagnosis Date    Asthma     Benign essential hypertension     Colon cancer (H) 2008    Osteopenia     Post concussive syndrome     Thyroid nodule        Past Surgical History:    Past Surgical History:   Procedure Laterality Date     ARTHROPLASTY HIP ANTERIOR Right 2024    Procedure: Right Direct Anterior Total Hip Arthroplasty;  Surgeon: Kirill Jose MD;  Location: [facility]    DILATION AND CURETTAGE, OPERATIVE HYSTEROSCOPY, COMBINED N/A 2023    Procedure: HYSTEROSCOPY, WITH DILATION AND CURETTAGE OF UTERUS;  Surgeon: Bakari Lott MD;  Location: [facility]    IR PICC PLACEMENT > 5 YRS OF AGE  2022    SALPINGO-OOPHORECTOMY BILATERAL Bilateral 2023    Procedure: LAPAROSCOPIC SALPINGO-OOPHORECTOMY, BILATERAL and peritoneal biopsies;  Surgeon: Bakari Lott MD;  Location: [facility]       Family History:    Family History   Problem Relation Age of Onset    Alzheimer Disease Mother     Alcoholism Mother     Myocardial Infarction Mother     Thrombosis Mother     Cerebrovascular Disease Father     Hearing Loss Father     Myocardial Infarction Father     Valvular heart disease Brother     Cerebrovascular Disease Brother     Cancer Brother     Hypertension Brother     Skin Cancer Brother     Osteopenia Brother     Hypertension Sister     Diabetes Sister     Valvular heart disease Sister     Aortic Valve Replacement Sister     Hypertension Sister     Breast Cancer Sister     Macular Degeneration Sister     Hypertension Sister     Osteopenia Sister        Social History:  Marital Status:  Single [1]  Social History     Tobacco Use    Smoking status: Former     Current packs/day: 0.00     Average packs/day: 2.0 packs/day for 14.0 years (27.9 ttl pk-yrs)     Types: Cigarettes     Start date: 1970     Quit date: 1977     Years since quittin.7     Passive exposure: Past    Smokeless tobacco: Never   Vaping Use    Vaping status: Never Used   Substance Use Topics    Alcohol use: Not Currently    Drug use: Never        Medications:    albuterol (VENTOLIN HFA) 108 (90 Base) MCG/ACT inhaler  ALEVE 220 MG tablet  aspirin (ASPIRIN EC ADULT LOW DOSE) 81 MG EC tablet  CALCIUM + VITAMIN D3 600-5 MG-MCG TABS  Calcium-Vitamin D 600-5 MG-MCG  TABS  cetirizine (ZYRTEC) 10 MG tablet  cholecalciferol (VITAMIN D3) 25 mcg (1000 units) capsule  erythromycin (ROMYCIN) 5 MG/GM ophthalmic ointment  fish oil-omega-3 fatty acids 1000 MG capsule  folic acid (FOLVITE) 400 MCG tablet  ipratropium - albuterol 0.5 mg/2.5 mg/3 mL (DUONEB) 0.5-2.5 (3) MG/3ML neb solution  Lutein 10 MG TABS  magnesium 100 MG CAPS  mometasone (NASONEX) 50 MCG/ACT nasal spray  predniSONE (DELTASONE) 20 MG tablet  Probiotic Product (PROBIOTIC PO)  ramipril (ALTACE) 10 MG capsule  theophylline (UNIPHYL) 400 MG 24 hr tablet  traZODone (DESYREL) 100 MG tablet  VITAMIN A PO  zinc 50 MG TABS          Review of Systems   HENT:  Positive for ear pain and sore throat.    Respiratory:  Positive for cough and shortness of breath.        Physical Exam   BP: 123/83  Pulse: 91  Temp: 99  F (37.2  C)  Resp: 20  SpO2: 99 %      Physical Exam  Vitals and nursing note reviewed.   Constitutional:       General: She is not in acute distress.     Appearance: Normal appearance. She is not ill-appearing or toxic-appearing.   Cardiovascular:      Rate and Rhythm: Regular rhythm.      Heart sounds: Normal heart sounds.   Pulmonary:      Effort: Accessory muscle usage present.      Breath sounds: Decreased air movement present. No decreased breath sounds, wheezing, rhonchi or rales.         ED Course        Procedures             Critical Care time:               No results found for this or any previous visit (from the past 24 hour(s)).    Medications   ipratropium - albuterol 0.5 mg/2.5 mg/3 mL (DUONEB) neb solution 3 mL (3 mLs Nebulization $Given 9/22/24 1205)       Assessments & Plan (with Medical Decision Making)   #1.  COVID-19 virus  #2.  Asthma exacerbation    Discussed exam findings with patient.  Patient given DuoNeb in urgent care and noted improvement of shortness of breath.  Patient's oxygen saturation is 99% on room air before and after neb treatment.  Patient is discharged with prescription for DuoNebs  as well as prednisone burst.  If they have increase shortness of breath they are to go to emergency department immediately.  Any additional concerns they can return to urgent care or follow-up with primary care provider.  Patient verbalized understanding and agreement of plan.      I have reviewed the nursing notes.    I have reviewed the findings, diagnosis, plan and need for follow up with the patient.                New Prescriptions    IPRATROPIUM - ALBUTEROL 0.5 MG/2.5 MG/3 ML (DUONEB) 0.5-2.5 (3) MG/3ML NEB SOLUTION    Take 1 vial (3 mLs) by nebulization every 6 hours as needed for shortness of breath, wheezing or cough.    PREDNISONE (DELTASONE) 20 MG TABLET    Take two tablets (= 40mg) each day for 5 (five) days       Final diagnoses:   COVID-19 virus infection   Asthma exacerbation       9/22/2024   [facility]       Dayton Cornejo PA-C  09/22/24 4022

## 2024-10-01 PROBLEM — Z86.0100 PERSONAL HISTORY OF COLON POLYPS, UNSPECIFIED: Status: ACTIVE | Noted: 2017-07-25

## 2024-10-03 ENCOUNTER — APPOINTMENT (OUTPATIENT)
Dept: GENERAL RADIOLOGY | Facility: HOSPITAL | Age: 64
End: 2024-10-03
Attending: NURSE PRACTITIONER
Payer: COMMERCIAL

## 2024-10-03 ENCOUNTER — HOSPITAL ENCOUNTER (EMERGENCY)
Facility: HOSPITAL | Age: 64
Discharge: HOME | End: 2024-10-03
Attending: NURSE PRACTITIONER | Admitting: NURSE PRACTITIONER
Payer: COMMERCIAL

## 2024-10-03 VITALS
DIASTOLIC BLOOD PRESSURE: 68 MMHG | HEART RATE: 105 BPM | TEMPERATURE: 98.7 F | WEIGHT: 212.6 LBS | HEIGHT: 65 IN | RESPIRATION RATE: 17 BRPM | OXYGEN SATURATION: 97 % | BODY MASS INDEX: 35.42 KG/M2 | SYSTOLIC BLOOD PRESSURE: 132 MMHG

## 2024-10-03 DIAGNOSIS — R06.02 SHORTNESS OF BREATH: ICD-10-CM

## 2024-10-03 DIAGNOSIS — R05.1 ACUTE COUGH: Primary | ICD-10-CM

## 2024-10-03 DIAGNOSIS — J45.901 ASTHMA EXACERBATION: ICD-10-CM

## 2024-10-03 PROCEDURE — 250N000009 HC RX 250: Performed by: NURSE PRACTITIONER

## 2024-10-03 PROCEDURE — G0463 HOSPITAL OUTPT CLINIC VISIT: HCPCS | Mod: 25

## 2024-10-03 PROCEDURE — 71046 X-RAY EXAM CHEST 2 VIEWS: CPT

## 2024-10-03 PROCEDURE — 94640 AIRWAY INHALATION TREATMENT: CPT

## 2024-10-03 PROCEDURE — 99214 OFFICE O/P EST MOD 30 MIN: CPT | Performed by: NURSE PRACTITIONER

## 2024-10-03 PROCEDURE — 250N000012 HC RX MED GY IP 250 OP 636 PS 637: Performed by: NURSE PRACTITIONER

## 2024-10-03 RX ORDER — PREDNISONE 20 MG/1
60 TABLET ORAL ONCE
Status: COMPLETED | OUTPATIENT
Start: 2024-10-03 | End: 2024-10-03

## 2024-10-03 RX ORDER — IPRATROPIUM BROMIDE AND ALBUTEROL SULFATE 2.5; .5 MG/3ML; MG/3ML
3 SOLUTION RESPIRATORY (INHALATION) ONCE
Status: COMPLETED | OUTPATIENT
Start: 2024-10-03 | End: 2024-10-03

## 2024-10-03 RX ORDER — IPRATROPIUM BROMIDE AND ALBUTEROL SULFATE 2.5; .5 MG/3ML; MG/3ML
1 SOLUTION RESPIRATORY (INHALATION) EVERY 6 HOURS PRN
Qty: 90 ML | Refills: 0 | Status: SHIPPED | OUTPATIENT
Start: 2024-10-03

## 2024-10-03 RX ORDER — PREDNISONE 20 MG/1
TABLET ORAL
Qty: 9 TABLET | Refills: 0 | Status: SHIPPED | OUTPATIENT
Start: 2024-10-04 | End: 2024-10-07

## 2024-10-03 RX ADMIN — IPRATROPIUM BROMIDE AND ALBUTEROL SULFATE 3 ML: .5; 3 SOLUTION RESPIRATORY (INHALATION) at 14:56

## 2024-10-03 RX ADMIN — PREDNISONE 60 MG: 20 TABLET ORAL at 15:13

## 2024-10-03 ASSESSMENT — ENCOUNTER SYMPTOMS
PALPITATIONS: 0
HEADACHES: 1
CHILLS: 0
COUGH: 1
FEVER: 0
SHORTNESS OF BREATH: 1

## 2024-10-03 ASSESSMENT — ACTIVITIES OF DAILY LIVING (ADL): ADLS_ACUITY_SCORE: 38

## 2024-10-03 NOTE — ED PROVIDER NOTES
History     Chief Complaint   Patient presents with    Shortness of Breath     HPI  Rosa Alcocer is a 64 year old female who has a history of asthma presents today for evaluation of shortness of breath.  Patient was seen in this department approximately 12 days ago after she had tested positive for COVID-19 and had developed an asthma exacerbation.  She was treated with prednisone burst and DuoNeb home treatments.  She tells me that shortness of breath is slightly improved.  Dry cough persists and is causing a headache.  She has chest pain with coughing.  No fevers or chills.  No leg swelling.  Denies cardiac history.  Reports increased use of her albuterol inhaler.  She ran out of the nebulizer solutions yesterday.  Last use of albuterol inhaler was this morning.    Aside from this, patient was taking over-the-counter cough syrup.    Allergies:  Allergies   Allergen Reactions    Codeine Shortness Of Breath and Swelling    Fentanyl Other (See Comments)     Bradycardia & Hypotension    Meloxicam Other (See Comments)     Hypersensitive, SOB, insomnia, HTN    No Clinical Screening - See Comments Other (See Comments) and Shortness Of Breath     MOLD, GRASSES. Some cleaning products, perfumes, tar,  Skunk scent    Prochlorperazine Hives, Shortness Of Breath and Swelling     COMPAZINE      Shellfish Allergy Hives, Shortness Of Breath and Swelling    Amlodipine Other (See Comments)     Hip pain and edema.    Cyclobenzaprine Other (See Comments)     Jittery, leg cramps, insomnia,     Losartan Cough, Itching and Muscle Pain (Myalgia)    Fluticasone Swelling    Oxycodone Headache and Nausea    Budesonide-Formoterol Fumarate Other (See Comments) and Palpitations     Leg pains       Problem List:    Patient Active Problem List    Diagnosis Date Noted    S/P total right hip arthroplasty 04/29/2024     Priority: Medium    Osteopenia 03/02/2023     Priority: Medium    Esophagitis 10/13/2022     Priority: Medium    Essential  hypertension 10/13/2022     Priority: Medium    Hyperlipidemia 10/13/2022     Priority: Medium    MS (multiple sclerosis) (H) 10/13/2022     Priority: Medium     history of multiple sclerosis, but has been in remission for years, not on current treatment.        Thyroid nodule 10/13/2022     Priority: Medium     Jan 2022 - stable nodules with recommendations to recheck in 1 year.        Class 2 obesity due to excess calories without serious comorbidity with body mass index (BMI) of 36.0 to 36.9 in adult 10/13/2022     Priority: Medium    Lumbar spondylosis 06/08/2022     Priority: Medium     S/p Left Radiofrequency percutaneous neurotomy of the L3, L4 medial branches and the L5 dorsal ramus, to cover the facet joints of L4/L5 and L5/S1.         History of osteomyelitis 01/06/2022     Priority: Medium     Of jaw, secondary to tooth abscess. S/p IV abx and PICC. Had 3 surgeries and lost 7 teeth. Follow up with ID as needed.      Post concussive syndrome 12/21/2020     Priority: Medium     history of a fall at work on12/26/2019, and sustained a traumatic brain injury and has chronic headaches and postconcussive syndrome. She was taking the garbage out and hit a patch of ice and hit her buttock and her head. Did do occupational therapy and see neurooptics and had prism glasses.        Vasomotor rhinitis 06/29/2018     Priority: Medium    Diverticulosis 07/25/2017     Priority: Medium    Personal history of colon polyps, unspecified 07/25/2017     Priority: Medium     2/24/22 - rpt in five years       Dysphonia 10/21/2014     Priority: Medium    Moderate persistent asthma without complication 06/18/2012     Priority: Medium     on theophylline. She was seen by Dr. Moreno for her asthma in the past and this was recommended by him. Her symptoms are tirggered by heat and humidity. She uses ventolin about once per day and feels symptoms.        Tubular adenoma 03/09/2009     Priority: Medium     On colonoscopy 2009.  Repeat  colonoscopy in 1 year per GI Recommendations (see note).          Past Medical History:    Past Medical History:   Diagnosis Date    Asthma     Benign essential hypertension     Colon cancer (H)     Osteopenia     Post concussive syndrome     Thyroid nodule        Past Surgical History:    Past Surgical History:   Procedure Laterality Date    ARTHROPLASTY HIP ANTERIOR Right 2024    Procedure: Right Direct Anterior Total Hip Arthroplasty;  Surgeon: Kirill Jose MD;  Location: [facility]    DILATION AND CURETTAGE, OPERATIVE HYSTEROSCOPY, COMBINED N/A 2023    Procedure: HYSTEROSCOPY, WITH DILATION AND CURETTAGE OF UTERUS;  Surgeon: Bakari Lott MD;  Location: [facility]    IR PICC PLACEMENT > 5 YRS OF AGE  2022    SALPINGO-OOPHORECTOMY BILATERAL Bilateral 2023    Procedure: LAPAROSCOPIC SALPINGO-OOPHORECTOMY, BILATERAL and peritoneal biopsies;  Surgeon: Bakari Lott MD;  Location: [facility]       Family History:    Family History   Problem Relation Age of Onset    Alzheimer Disease Mother     Alcoholism Mother     Myocardial Infarction Mother     Thrombosis Mother     Cerebrovascular Disease Father     Hearing Loss Father     Myocardial Infarction Father     Valvular heart disease Brother     Cerebrovascular Disease Brother     Cancer Brother     Hypertension Brother     Skin Cancer Brother     Osteopenia Brother     Hypertension Sister     Diabetes Sister     Valvular heart disease Sister     Aortic Valve Replacement Sister     Hypertension Sister     Breast Cancer Sister     Macular Degeneration Sister     Hypertension Sister     Osteopenia Sister        Social History:  Marital Status:  Single [1]  Social History     Tobacco Use    Smoking status: Former     Current packs/day: 0.00     Average packs/day: 2.0 packs/day for 14.0 years (27.9 ttl pk-yrs)     Types: Cigarettes     Start date: 1970     Quit date: 1977     Years since quittin.7     Passive exposure: Past    Smokeless tobacco:  "Never   Vaping Use    Vaping status: Never Used   Substance Use Topics    Alcohol use: Not Currently    Drug use: Never        Medications:    albuterol (VENTOLIN HFA) 108 (90 Base) MCG/ACT inhaler  ipratropium - albuterol 0.5 mg/2.5 mg/3 mL (DUONEB) 0.5-2.5 (3) MG/3ML neb solution  [START ON 10/4/2024] predniSONE (DELTASONE) 20 MG tablet  ALEVE 220 MG tablet  aspirin (ASPIRIN EC ADULT LOW DOSE) 81 MG EC tablet  CALCIUM + VITAMIN D3 600-5 MG-MCG TABS  Calcium-Vitamin D 600-5 MG-MCG TABS  cetirizine (ZYRTEC) 10 MG tablet  cholecalciferol (VITAMIN D3) 25 mcg (1000 units) capsule  erythromycin (ROMYCIN) 5 MG/GM ophthalmic ointment  fish oil-omega-3 fatty acids 1000 MG capsule  folic acid (FOLVITE) 400 MCG tablet  Lutein 10 MG TABS  magnesium 100 MG CAPS  mometasone (NASONEX) 50 MCG/ACT nasal spray  Probiotic Product (PROBIOTIC PO)  ramipril (ALTACE) 10 MG capsule  theophylline (UNIPHYL) 400 MG 24 hr tablet  traZODone (DESYREL) 100 MG tablet  VITAMIN A PO  zinc 50 MG TABS          Review of Systems   Constitutional:  Negative for chills and fever.   Respiratory:  Positive for cough and shortness of breath.    Cardiovascular:  Positive for chest pain (With coughing). Negative for palpitations and leg swelling.   Neurological:  Positive for headaches.   All other systems reviewed and are negative.      Physical Exam   BP: 132/68  Pulse: 108  Temp: 98.7  F (37.1  C)  Resp: 17  Height: 165.1 cm (5' 5\")  Weight: 96.4 kg (212 lb 9.6 oz)  SpO2: 97 %      Physical Exam  Vitals and nursing note reviewed.   Constitutional:       Appearance: She is well-developed. She is not ill-appearing or toxic-appearing.   HENT:      Head: Atraumatic.      Mouth/Throat:      Mouth: Mucous membranes are moist.   Cardiovascular:      Rate and Rhythm: Normal rate and regular rhythm.      Heart sounds: No murmur heard.     No friction rub.   Pulmonary:      Effort: Pulmonary effort is normal. No tachypnea, bradypnea, accessory muscle usage or " respiratory distress.      Breath sounds: No stridor. No decreased breath sounds, wheezing, rhonchi or rales.   Musculoskeletal:      Cervical back: Normal range of motion.      Right lower leg: No edema.      Left lower leg: No edema.   Skin:     General: Skin is warm.      Capillary Refill: Capillary refill takes less than 2 seconds.      Coloration: Skin is not pale.   Neurological:      General: No focal deficit present.      Mental Status: She is alert and oriented to person, place, and time.         ED Course        Procedures         Results for orders placed or performed during the hospital encounter of 10/03/24 (from the past 24 hour(s))   XR Chest 2 Views    Narrative    PROCEDURE:  XR CHEST 2 VIEWS    HISTORY:  COVID-positive 9/21/2024 which resulted in asthma  exacerbation; asthma exacerbation was treated with slight improvement  to breathing ; c/o shortness of breath, persistent cough, chest pain  with coughing; r/o pneumonia.     COMPARISON:  None.    FINDINGS:   The cardiac silhouette is normal in size. The pulmonary vasculature is  normal.  There is some linear atelectasis or scarring at the left lung  base. Hiatal hernia is seen. No pleural effusion or pneumothorax.      Impression    IMPRESSION:  Linear atelectasis or scarring at the left lung base.  Hiatal hernia.      DANIELE MEHTA MD         SYSTEM ID:  [id number]       Medications   ipratropium - albuterol 0.5 mg/2.5 mg/3 mL (DUONEB) neb solution 3 mL (3 mLs Nebulization $Given 10/3/24 3178)   predniSONE (DELTASONE) tablet 60 mg (60 mg Oral $Given 10/3/24 5943)       Assessments & Plan (with Medical Decision Making)   Pleasant 64-year-old female that presented today for evaluation of a persistent cough and shortness of breath with symptoms having started almost 2 weeks ago.  She tested positive for COVID-19 12 days ago and was treated for acute asthma exacerbation shortly after.  Today her heart rate and rhythm are regular.  Her respirations  are even and nonlabored.  Her oxygen saturation was 97% on room air.  Talking in full sentences.  Out of concern of pneumonia chest x-ray was completed today which was negative for pneumonia or any acute findings.  Patient was given DuoNeb treatment and reported improvement to her breathing.  She states she is currently out of the nebulizer solutions that she was using at home.  Does continue to use albuterol inhaler but more than her baseline.    Symptoms appear consistent with with mild asthma exacerbation today.  She is not hypoxic.  Negative Wells criteria.  Will refill her nebulizer solutions.  Patient was given 1 dose of prednisone during this visit which she has tolerated well.  She will be sent home with a prescription for prednisone taper.  She can continue using her albuterol inhaler as needed and taking over-the-counter cough syrup that she has been doing.  I recommended patient schedule an appointment with her primary doctor for close follow-up.  She will return to urgent care emergency room for any worsening or concerning symptoms.    I have reviewed the nursing notes.    I have reviewed the findings, diagnosis, plan and need for follow up with the patient.  This document was prepared using a combination of typing and voice generated software.  While every attempt was made for accuracy, spelling and grammatical errors may exist.         New Prescriptions    IPRATROPIUM - ALBUTEROL 0.5 MG/2.5 MG/3 ML (DUONEB) 0.5-2.5 (3) MG/3ML NEB SOLUTION    Take 1 vial (3 mLs) by nebulization every 6 hours as needed for shortness of breath, wheezing or cough.    PREDNISONE (DELTASONE) 20 MG TABLET    2 tabs day 1-2, then 1 tab days 3-4, then 1/2 tab daily for 6 days       Final diagnoses:   Acute cough   Shortness of breath   Asthma exacerbation       10/3/2024   [facility]       Mpofu, Raúludence, CNP  10/03/24 2183

## 2024-10-03 NOTE — ED TRIAGE NOTES
VIDA Sheehan CNP assessed patient in triage and determined patient Urgent Care appropriate. Will be seen in Urgent Care.

## 2024-10-03 NOTE — ED TRIAGE NOTES
Pt presents with c/o coughing, headache. Sx started sept 21st. Reports she had covid at the end of September. Pt has been taking tylenol.

## 2024-10-03 NOTE — DISCHARGE INSTRUCTIONS
Your chest x-ray does not show any pneumonia today.  As discussed I am treating you for your asthma with a longer prednisone dose.  Continue using your albuterol inhaler or doing nebulizer treatments as needed.    Please schedule an appointment with your primary doctor to reevaluate you in the next 3 to 5 days.    Return to urgent care or emergency room for any worsening or concerning symptoms.

## 2024-10-04 ENCOUNTER — TELEPHONE (OUTPATIENT)
Dept: FAMILY MEDICINE | Facility: OTHER | Age: 64
End: 2024-10-04

## 2024-10-04 NOTE — TELEPHONE ENCOUNTER
Symptom or reason needing to speak to RN: uc follow up / covid / asthma / [facility]     Best number to return call: [phone]      Best time to return call: soon

## 2024-10-04 NOTE — TELEPHONE ENCOUNTER
ER F/U  10/3/24    Mpofu, Prudence, CNP  Last attending  Treatment team Acute cough +2 more  Clinical impression Shortness of Breath  Chief complaint       Assessments & Plan (with Medical Decision Making)   Pleasant 64-year-old female that presented today for evaluation of a persistent cough and shortness of breath with symptoms having started almost 2 weeks ago.  She tested positive for COVID-19 12 days ago and was treated for acute asthma exacerbation shortly after.  Today her heart rate and rhythm are regular.  Her respirations are even and nonlabored.  Her oxygen saturation was 97% on room air.  Talking in full sentences.  Out of concern of pneumonia chest x-ray was completed today which was negative for pneumonia or any acute findings.  Patient was given DuoNeb treatment and reported improvement to her breathing.  She states she is currently out of the nebulizer solutions that she was using at home.  Does continue to use albuterol inhaler but more than her baseline.     Symptoms appear consistent with with mild asthma exacerbation today.  She is not hypoxic.  Negative Wells criteria.  Will refill her nebulizer solutions.  Patient was given 1 dose of prednisone during this visit which she has tolerated well.  She will be sent home with a prescription for prednisone taper.  She can continue using her albuterol inhaler as needed and taking over-the-counter cough syrup that she has been doing.  I recommended patient schedule an appointment with her primary doctor for close follow-up.  She will return to urgent care emergency room for any worsening or concerning symptoms.     I have reviewed the nursing notes.     I have reviewed the findings, diagnosis, plan and need for follow up with the patient.  This document was prepared using a combination of typing and voice generated software.  While every attempt was made for accuracy, spelling and grammatical errors may exist.                 New Prescriptions      IPRATROPIUM - ALBUTEROL 0.5 MG/2.5 MG/3 ML (DUONEB) 0.5-2.5 (3) MG/3ML NEB SOLUTION    Take 1 vial (3 mLs) by nebulization every 6 hours as needed for shortness of breath, wheezing or cough.     PREDNISONE (DELTASONE) 20 MG TABLET    2 tabs day 1-2, then 1 tab days 3-4, then 1/2 tab daily for 6 days

## 2024-10-07 ENCOUNTER — OFFICE VISIT (OUTPATIENT)
Dept: FAMILY MEDICINE | Facility: OTHER | Age: 64
End: 2024-10-07
Attending: FAMILY MEDICINE
Payer: COMMERCIAL

## 2024-10-07 VITALS
HEART RATE: 94 BPM | SYSTOLIC BLOOD PRESSURE: 134 MMHG | TEMPERATURE: 99 F | WEIGHT: 216.3 LBS | DIASTOLIC BLOOD PRESSURE: 78 MMHG | HEIGHT: 65 IN | RESPIRATION RATE: 16 BRPM | OXYGEN SATURATION: 97 % | BODY MASS INDEX: 36.04 KG/M2

## 2024-10-07 DIAGNOSIS — I10 ESSENTIAL HYPERTENSION: Chronic | ICD-10-CM

## 2024-10-07 DIAGNOSIS — U07.1 INFECTION DUE TO 2019 NOVEL CORONAVIRUS: Primary | ICD-10-CM

## 2024-10-07 DIAGNOSIS — J45.40 MODERATE PERSISTENT ASTHMA WITHOUT COMPLICATION: ICD-10-CM

## 2024-10-07 DIAGNOSIS — Z96.641 STATUS POST TOTAL REPLACEMENT OF RIGHT HIP: ICD-10-CM

## 2024-10-07 PROCEDURE — G0463 HOSPITAL OUTPT CLINIC VISIT: HCPCS

## 2024-10-07 PROCEDURE — 99213 OFFICE O/P EST LOW 20 MIN: CPT | Performed by: FAMILY MEDICINE

## 2024-10-07 RX ORDER — ASPIRIN 81 MG/1
81 TABLET ORAL 2 TIMES DAILY
Qty: 180 TABLET | Refills: 3 | Status: SHIPPED | OUTPATIENT
Start: 2024-10-07

## 2024-10-07 RX ORDER — RAMIPRIL 10 MG/1
10 CAPSULE ORAL DAILY
Qty: 90 CAPSULE | Refills: 1 | Status: SHIPPED | OUTPATIENT
Start: 2024-10-07

## 2024-10-07 ASSESSMENT — PAIN SCALES - GENERAL: PAINLEVEL: MODERATE PAIN (5)

## 2024-10-07 NOTE — PROGRESS NOTES
Assessment & Plan     Infection due to 2019 novel coronavirus  Improving.  Did not get Paxlovid for unclear reason? She qualified base on timing and health conditions.  Note to clinic staff to review.  Completed steroid course.  Continue with rest, nebs/inhalers.  Reassess if not improving.  Defer flu vaccine x 2 weeks given current steroid - last dose today.    Moderate persistent asthma without complication  As above.    Essential hypertension  Stable.  Refill sent.  - ramipril (ALTACE) 10 MG capsule; Take 1 capsule (10 mg) by mouth daily.    Status post total replacement of right hip  chronic  - aspirin (ASPIRIN EC ADULT LOW DOSE) 81 MG EC tablet; Take 1 tablet (81 mg) by mouth 2 times daily.        MED REC REQUIRED  Post Medication Reconciliation Status: discharge medications reconciled, continue medications without change    See Patient Instructions    Return in about 2 weeks (around 10/21/2024) for flu shot .    Margaret Lee is a 64 year old, presenting for the following health issues:  ER F/U (Asthma exacerbation, SOB, covid)        10/7/2024     1:16 PM   Additional Questions   Roomed by José Mnauel Ness LPN   Accompanied by Self         10/7/2024     1:16 PM   Patient Reported Additional Medications   Patient reports taking the following new medications Ipratropium-Albuterol nebulizer, just finished prednisone     HPI     ED/UC Followup:    Facility:   [facility]  Date of visit: 9/22/24, 10/3/24  Reason for visit: Asthma exacerbation, SOB  Current Status: covid infection and asthma exacerbation    Duoneb and prednisone burst  Completed steroid - did cause some dizziness, blurred vision.  Last dose today.  Home nebs every 6 hours - helpful  Albuterol - as needed - few times per day    No fever, but baseline temp high for her up to 99.  Fatigued.    Positive home test 9/22/24.    No vomiting, diarrhea, or rash.  Taste changes.  Down 12 pounds.    Xray negative other than atelectasis.    Declined paxlovid  "by ER - \"he wouldn't give it to me\".     5 months since hip replaced.  Then dislocated hip.  Had damage to SI joint.  Had 1st injection.  2nd injection scheduled 10/28/24.   Planning left surgery - hardware - \"metal plate\".     Hypertension Follow-up=  Do you check your blood pressure regularly outside of the clinic? Yes   Are you following a low salt diet? Yes  Are your blood pressures ever more than 140 on the top number (systolic) OR more   than 90 on the bottom number (diastolic), for example 140/90? No    Needs refill of Ramipril -     Flu/covid vaccines - defer - recent covid and steroid use    Review of Systems  Constitutional, HEENT, cardiovascular, pulmonary, gi and gu systems are negative, except as otherwise noted.      Objective    /78   Pulse 94   Temp 99  F (37.2  C) (Tympanic)   Resp 16   Ht 1.651 m (5' 5\")   Wt 98.1 kg (216 lb 4.8 oz)   SpO2 97%   BMI 35.99 kg/m    Body mass index is 35.99 kg/m .  Physical Exam   GENERAL: alert, no distress, and over weight  NECK: no adenopathy, no asymmetry, masses, or scars  RESP: lungs clear to auscultation - no rales, rhonchi or wheezes  CV: regular rate and rhythm, normal S1 S2, no S3 or S4, no murmur, click or rub, no peripheral edema  ABDOMEN: soft, nontender, no hepatosplenomegaly, no masses and bowel sounds normal  MS: no gross musculoskeletal defects noted, no edema  PSYCH: mentation appears normal, affect normal/bright            Signed Electronically by: Salma Rios MD    "

## 2024-10-21 ENCOUNTER — IMMUNIZATION (OUTPATIENT)
Dept: FAMILY MEDICINE | Facility: OTHER | Age: 64
End: 2024-10-21
Attending: FAMILY MEDICINE
Payer: COMMERCIAL

## 2024-10-21 DIAGNOSIS — Z23 NEED FOR PROPHYLACTIC VACCINATION AND INOCULATION AGAINST INFLUENZA: Primary | ICD-10-CM

## 2024-10-21 PROCEDURE — G0008 ADMIN INFLUENZA VIRUS VAC: HCPCS

## 2024-11-12 ENCOUNTER — TRANSFERRED RECORDS (OUTPATIENT)
Dept: HEALTH INFORMATION MANAGEMENT | Facility: CLINIC | Age: 64
End: 2024-11-12

## 2024-11-18 DIAGNOSIS — J45.40 MODERATE PERSISTENT ASTHMA WITHOUT COMPLICATION: ICD-10-CM

## 2024-11-18 NOTE — TELEPHONE ENCOUNTER
Theophylline 400 mg 24 hr tab      Last Written Prescription Date:  8-6-24  Last Fill Quantity: 90,   # refills: 0  Last Office Visit: 10-7-24

## 2024-11-19 RX ORDER — THEOPHYLLINE 400 MG/1
400 TABLET, EXTENDED RELEASE ORAL DAILY
Qty: 90 TABLET | Refills: 0 | Status: SHIPPED | OUTPATIENT
Start: 2024-11-19

## 2025-03-26 DIAGNOSIS — Z00.00 ROUTINE ADULT HEALTH MAINTENANCE: ICD-10-CM

## 2025-03-26 RX ORDER — CALCIUM CARBONATE/VITAMIN D3 600MG-5MCG
1 TABLET ORAL EVERY MORNING
Qty: 90 TABLET | Refills: 0 | Status: SHIPPED | OUTPATIENT
Start: 2025-03-26

## 2025-03-26 NOTE — TELEPHONE ENCOUNTER
Calcium-Vitamin D 600-5 MG-MCG tablets     Last Written Prescription Date:  05/31/2024  Last Fill Quantity: 90,   # refills: 0  Last Office Visit: 10/07/2024  Future Office visit:       Routing refill request to provider for review/approval because:  Protocol failed on the Calcium-Vitamin D.

## 2025-04-08 DIAGNOSIS — G47.00 INSOMNIA, UNSPECIFIED TYPE: ICD-10-CM

## 2025-04-08 RX ORDER — TRAZODONE HYDROCHLORIDE 100 MG/1
100 TABLET ORAL AT BEDTIME
Qty: 90 TABLET | Refills: 1 | Status: SHIPPED | OUTPATIENT
Start: 2025-04-08

## 2025-04-10 ENCOUNTER — TRANSFERRED RECORDS (OUTPATIENT)
Dept: HEALTH INFORMATION MANAGEMENT | Facility: CLINIC | Age: 65
End: 2025-04-10

## 2025-04-15 ENCOUNTER — ANCILLARY PROCEDURE (OUTPATIENT)
Dept: MAMMOGRAPHY | Facility: OTHER | Age: 65
End: 2025-04-15
Attending: FAMILY MEDICINE
Payer: COMMERCIAL

## 2025-04-15 ENCOUNTER — TELEPHONE (OUTPATIENT)
Dept: MAMMOGRAPHY | Facility: HOSPITAL | Age: 65
End: 2025-04-15

## 2025-04-15 DIAGNOSIS — Z12.31 VISIT FOR SCREENING MAMMOGRAM: ICD-10-CM

## 2025-04-15 PROCEDURE — 77063 BREAST TOMOSYNTHESIS BI: CPT | Mod: TC

## 2025-04-15 PROCEDURE — 77067 SCR MAMMO BI INCL CAD: CPT | Mod: TC

## 2025-04-15 PROCEDURE — 77067 SCR MAMMO BI INCL CAD: CPT | Mod: 26 | Performed by: RADIOLOGY

## 2025-04-15 PROCEDURE — 77063 BREAST TOMOSYNTHESIS BI: CPT | Mod: 26 | Performed by: RADIOLOGY

## 2025-04-21 DIAGNOSIS — I10 ESSENTIAL HYPERTENSION: Chronic | ICD-10-CM

## 2025-04-21 RX ORDER — RAMIPRIL 10 MG/1
10 CAPSULE ORAL DAILY
Qty: 90 CAPSULE | Refills: 1 | Status: SHIPPED | OUTPATIENT
Start: 2025-04-21

## 2025-05-05 ENCOUNTER — OFFICE VISIT (OUTPATIENT)
Dept: FAMILY MEDICINE | Facility: OTHER | Age: 65
End: 2025-05-05
Attending: FAMILY MEDICINE
Payer: COMMERCIAL

## 2025-05-05 VITALS
WEIGHT: 207.8 LBS | HEART RATE: 107 BPM | BODY MASS INDEX: 34.62 KG/M2 | DIASTOLIC BLOOD PRESSURE: 80 MMHG | SYSTOLIC BLOOD PRESSURE: 130 MMHG | RESPIRATION RATE: 20 BRPM | TEMPERATURE: 97.4 F | OXYGEN SATURATION: 99 % | HEIGHT: 65 IN

## 2025-05-05 DIAGNOSIS — Z13.1 SCREENING FOR DIABETES MELLITUS: ICD-10-CM

## 2025-05-05 DIAGNOSIS — J20.9 ACUTE BRONCHITIS, UNSPECIFIED ORGANISM: Primary | ICD-10-CM

## 2025-05-05 DIAGNOSIS — E78.5 HYPERLIPIDEMIA, UNSPECIFIED HYPERLIPIDEMIA TYPE: ICD-10-CM

## 2025-05-05 DIAGNOSIS — Z12.11 SPECIAL SCREENING FOR MALIGNANT NEOPLASMS, COLON: ICD-10-CM

## 2025-05-05 DIAGNOSIS — E04.1 THYROID NODULE: ICD-10-CM

## 2025-05-05 DIAGNOSIS — M85.89 OTHER SPECIFIED DISORDERS OF BONE DENSITY AND STRUCTURE, MULTIPLE SITES: ICD-10-CM

## 2025-05-05 DIAGNOSIS — I10 ESSENTIAL HYPERTENSION: ICD-10-CM

## 2025-05-05 DIAGNOSIS — J45.40 MODERATE PERSISTENT ASTHMA WITHOUT COMPLICATION: ICD-10-CM

## 2025-05-05 DIAGNOSIS — M85.80 OSTEOPENIA, UNSPECIFIED LOCATION: ICD-10-CM

## 2025-05-05 DIAGNOSIS — D64.9 ANEMIA, UNSPECIFIED TYPE: ICD-10-CM

## 2025-05-05 PROCEDURE — G0463 HOSPITAL OUTPT CLINIC VISIT: HCPCS

## 2025-05-05 RX ORDER — DOXYCYCLINE 100 MG/1
100 CAPSULE ORAL 2 TIMES DAILY
Qty: 14 CAPSULE | Refills: 0 | Status: SHIPPED | OUTPATIENT
Start: 2025-05-05 | End: 2025-05-12

## 2025-05-05 RX ORDER — THEOPHYLLINE 400 MG/1
400 TABLET, EXTENDED RELEASE ORAL DAILY
Qty: 90 TABLET | Refills: 1 | Status: SHIPPED | OUTPATIENT
Start: 2025-05-05

## 2025-05-05 ASSESSMENT — ASTHMA QUESTIONNAIRES
QUESTION_1 LAST FOUR WEEKS HOW MUCH OF THE TIME DID YOUR ASTHMA KEEP YOU FROM GETTING AS MUCH DONE AT WORK, SCHOOL OR AT HOME: SOME OF THE TIME
ACT_TOTALSCORE: 18
QUESTION_3 LAST FOUR WEEKS HOW OFTEN DID YOUR ASTHMA SYMPTOMS (WHEEZING, COUGHING, SHORTNESS OF BREATH, CHEST TIGHTNESS OR PAIN) WAKE YOU UP AT NIGHT OR EARLIER THAN USUAL IN THE MORNING: ONCE OR TWICE
QUESTION_2 LAST FOUR WEEKS HOW OFTEN HAVE YOU HAD SHORTNESS OF BREATH: ONCE A DAY
QUESTION_5 LAST FOUR WEEKS HOW WOULD YOU RATE YOUR ASTHMA CONTROL: WELL CONTROLLED
QUESTION_4 LAST FOUR WEEKS HOW OFTEN HAVE YOU USED YOUR RESCUE INHALER OR NEBULIZER MEDICATION (SUCH AS ALBUTEROL): NOT AT ALL

## 2025-05-05 ASSESSMENT — PAIN SCALES - GENERAL: PAINLEVEL_OUTOF10: MODERATE PAIN (4)

## 2025-05-05 NOTE — Clinical Note
Please notify patient - I found records in scanned media - last colonoscopy 2/2022; surgical note stated repeat 5 years, not 3.  Our HCM data is correct.  Repeat 2/2027.

## 2025-05-05 NOTE — PROGRESS NOTES
"  Assessment & Plan     Acute bronchitis, unspecified organism  Not improving - over 2 weeks of symptoms.  Lungs clear, afebrile, normal oxygen status.  Low suspicion for pneumonia.  Treat with Doxycycline.  Start using her Duonebs.  If not resolving, reassess.  Xray, etc.  - doxycycline hyclate (VIBRAMYCIN) 100 MG capsule; Take 1 capsule (100 mg) by mouth 2 times daily for 7 days.    Moderate persistent asthma without complication  As above.  - theophylline (UNIPHYL) 400 MG 24 hr tablet; Take 1 tablet (400 mg) by mouth daily.    Osteopenia, unspecified location  Due for dexa - ordered - and lab.  - DX Bone Density; Future  - Vitamin D Deficiency; Future    Other specified disorders of bone density and structure, multiple sites  As above  - DX Bone Density; Future    Essential hypertension  At goal.  - CBC with platelets and differential; Future  - Comprehensive metabolic panel (BMP + Alb, Alk Phos, ALT, AST, Total. Bili, TP); Future  - Albumin Random Urine Quantitative with Creat Ratio; Future    Hyperlipidemia, unspecified hyperlipidemia type  Annual labs due soon.  - Lipid Profile (Chol, Trig, HDL, LDL calc); Future    Anemia, unspecified type  - CBC with platelets and differential; Future    Screening for diabetes mellitus  - Hemoglobin A1c; Future    Special screening for malignant neoplasms, colon  Did locate records in scanned media.  Last colonoscopy 2/24/22; per surgical note repeat 5 years.      Scheduled for physical prior to leaving today.    The longitudinal plan of care for the diagnosis(es)/condition(s) as documented were addressed during this visit. Due to the added complexity in care, I will continue to support Rosa in the subsequent management and with ongoing continuity of care.  BMI  Estimated body mass index is 34.58 kg/m  as calculated from the following:    Height as of this encounter: 1.651 m (5' 5\").    Weight as of this encounter: 94.3 kg (207 lb 12.8 oz).       Follow-up  Return for " "physical next available.    Subjective   Rosa is a 64 year old, presenting for the following health issues:  Cough and shortness of breath    History of Present Illness       Headaches:   Since the patient's last clinic visit, headaches are: no change  The patient is getting headaches:  3  She is able to do normal daily activities when she has a migraine.  The patient is taking the following rescue/relief medications:  Tylenol   Patient states \"I get some relief\" from the rescue/relief medications.   The patient is taking the following medications to prevent migraines:  No medications to prevent migraines  In the past 4 weeks, the patient has gone to an Urgent Care or Emergency Room 0 times times due to headaches.   She is taking medications regularly.        Acute Illness - 2 weeks or more  Acute illness concerns: cough, shortness of breath   Onset/Duration: started around Easter -had a cold, and now she feels congested   Symptoms:  Fever: No  Chills/Sweats: YES  Headache (location?): YES  Sinus Pressure: YES  Conjunctivitis:  No  Ear Pain: no  Rhinorrhea: YES  Congestion: YES  Sore Throat: YES- little bit   Cough: YES-non-productive; no blood  Wheeze: YES  Decreased Appetite: No  Nausea: No  Vomiting: No  Diarrhea: No  Dysuria/Freq.: No  Dysuria or Hematuria: No  Fatigue/Achiness: No  Sick/Strep Exposure: No  Therapies tried and outcome: None  No travel    Started with cold - sinuses, head - low grade temp.  Moved into chest.  Now both.    No recent antibiotics.    Vaccinated flu.  Overdue covid booster.    Former smoker.  Very remote.    Duoneb, theophylline.   Not using duoneb- didn't try.  Prn albuterol.  Somewhat helpful.  1-2 times per day with recent illness.  Allergies - zyrtec, nasonex.  Not her typical season.  No recent steroid.    Ongoing cough since 9/2024 - with covid infection.    Osteopenia - due for dexa - last done 3/2023 - due for lab    Colon screen -last one [facility] - told 3 year; flagged due " "for 5 year interval.  No new symptoms.  No new family history.  [facility] -       Review of Systems  Constitutional, HEENT, cardiovascular, pulmonary, gi and gu systems are negative, except as otherwise noted.      Objective    /80 (BP Location: Left arm, Patient Position: Sitting, Cuff Size: Adult Large)   Pulse 107   Temp 97.4  F (36.3  C) (Tympanic)   Resp 20   Ht 1.651 m (5' 5\")   Wt 94.3 kg (207 lb 12.8 oz)   SpO2 99%   BMI 34.58 kg/m    Body mass index is 34.58 kg/m .  Physical Exam   GENERAL: alert, no distress, and wearing mask; ambulates with cane  EYES: Eyes grossly normal to inspection, PERRL and conjunctivae and sclerae normal  HENT: normal cephalic/atraumatic, ear canals and TM's normal, nose and mouth without ulcers or lesions, rhinorrhea clear, oropharynx clear, and oral mucous membranes moist  NECK: no adenopathy, no asymmetry, masses, or scars  RESP: lungs clear to auscultation - no rales, rhonchi or wheezes  RESP: loose cough  CV: regular rate and rhythm, normal S1 S2, no S3 or S4, no murmur, click or rub, no peripheral edema  MS: no gross musculoskeletal defects noted, no edema  PSYCH: mentation appears normal, affect normal/bright    Future fasting labs placed        Signed Electronically by: Salma Rios MD    "

## 2025-05-05 NOTE — PATIENT INSTRUCTIONS
Complete antibiotic course.  Trial of Duonebs as discussed.    DEXA scan ordered - radiology will call you to schedule.    Checking on colonoscopy [facility] records - ?3 years vs 5 years?    Physical scheduled.

## 2025-05-06 NOTE — PROGRESS NOTES
"Called pt and informed of message from Dr. Masterson: \"Please notify patient - I found records in scanned media - last colonoscopy 2/2022; surgical note stated repeat 5 years, not 3.  Our HCM data is correct.  Repeat 2/2027.\" Pt stated understanding.   "

## 2025-05-19 ENCOUNTER — HOSPITAL ENCOUNTER (OUTPATIENT)
Dept: BONE DENSITY | Facility: HOSPITAL | Age: 65
Discharge: HOME | End: 2025-05-19
Attending: FAMILY MEDICINE | Admitting: STUDENT IN AN ORGANIZED HEALTH CARE EDUCATION/TRAINING PROGRAM
Payer: COMMERCIAL

## 2025-05-19 DIAGNOSIS — M85.80 OSTEOPENIA, UNSPECIFIED LOCATION: ICD-10-CM

## 2025-05-19 DIAGNOSIS — M85.89 OTHER SPECIFIED DISORDERS OF BONE DENSITY AND STRUCTURE, MULTIPLE SITES: ICD-10-CM

## 2025-05-19 PROCEDURE — 77080 DXA BONE DENSITY AXIAL: CPT | Mod: 26 | Performed by: STUDENT IN AN ORGANIZED HEALTH CARE EDUCATION/TRAINING PROGRAM

## 2025-05-19 PROCEDURE — 77080 DXA BONE DENSITY AXIAL: CPT

## 2025-05-20 ENCOUNTER — RESULTS FOLLOW-UP (OUTPATIENT)
Dept: FAMILY MEDICINE | Facility: OTHER | Age: 65
End: 2025-05-20

## 2025-05-20 DIAGNOSIS — R10.32 LLQ ABDOMINAL PAIN: ICD-10-CM

## 2025-05-20 DIAGNOSIS — Z13.1 SCREENING FOR DIABETES MELLITUS: Primary | ICD-10-CM

## 2025-05-20 DIAGNOSIS — D64.9 ANEMIA, UNSPECIFIED TYPE: ICD-10-CM

## 2025-06-09 ENCOUNTER — HOSPITAL ENCOUNTER (OUTPATIENT)
Facility: HOSPITAL | Age: 65
End: 2025-06-09
Attending: ORTHOPAEDIC SURGERY | Admitting: ORTHOPAEDIC SURGERY
Payer: COMMERCIAL

## 2025-06-09 ENCOUNTER — PREP FOR PROCEDURE (OUTPATIENT)
Dept: SURGERY | Facility: CLINIC | Age: 65
End: 2025-06-09

## 2025-06-09 ENCOUNTER — TELEPHONE (OUTPATIENT)
Dept: FAMILY MEDICINE | Facility: OTHER | Age: 65
End: 2025-06-09

## 2025-06-09 ENCOUNTER — TRANSFERRED RECORDS (OUTPATIENT)
Dept: HEALTH INFORMATION MANAGEMENT | Facility: CLINIC | Age: 65
End: 2025-06-09

## 2025-06-09 DIAGNOSIS — M16.12 PRIMARY OSTEOARTHRITIS OF LEFT HIP: Primary | ICD-10-CM

## 2025-06-09 NOTE — TELEPHONE ENCOUNTER
June 9, 2025    1:52 PM    Reason for Call: OVERBOOK    Patient is having Left Direct Anterior Total Hip Arthroplasty with Dr Jose [email or web address]/25[email or web address] [place]    The patient is requesting an appointment for pre-op with Dr Rios.      Preferred method for responding to this message: Telephone Call  What is your phone number? [phone]    If we cannot reach you directly, may we leave a detailed response at the number you provided? Yes    -Yesika Razo on 6/9/2025 at 1:52 PM

## 2025-06-11 NOTE — PATIENT INSTRUCTIONS
Anemia - hemoglobin 7.5.  Stop aspirin and any ibuprofen products, including aleve.  Ok to take Tylenol.  Repeat lab Monday - may need blood transfusion.  Referral to general surgery - need colonoscopy, possible upper endoscopy.  May want imaging, such as CT first.          How to Take Your Medication Before Surgery  Preoperative Medication Instructions   Antiplatelet or Anticoagulation Medication Instructions   - aspirin: Discontinue aspirin 7 days prior to procedure to reduce bleeding risk. It should be resumed postoperatively.     Additional Medication Instructions  Take all scheduled medications on the day of surgery EXCEPT for modifications listed below:   - Herbal medications and vitamins: DO NOT TAKE 14 days prior to surgery.   - ACE/ARB/ARNI (lisinopril, enalapril, losartan, valsartan, olmesartan, sacubritril/valsartan) : Continue without modification (e.g., MAC anesthesia, neurosurgery, spine surgery, heart failure, or labile hypertension with risk of hypertension).   - ibuprofen (Advil, Motrin): DO NOT TAKE 1 day before surgery.    - naproxen (Aleve, Naprosyn): DO NOT TAKE 4 days before surgery.    - Topicals: DO NOT TAKE day of surgery.       Patient Education   Preparing for Your Surgery  For Adults  Getting started  In most cases, a nurse will call to review your health history and instructions. They will give you an arrival time based on your scheduled surgery time. Please be ready to share:  Your doctor's clinic name and phone number  Your medical, surgical, and anesthesia history  A list of allergies and sensitivities  A list of medicines, including herbal treatments and over-the-counter drugs  Whether the patient has a legal guardian (ask how to send us the papers in advance)  Note: You may not receive a call if you were seen at our PAC (Preoperative Assessment Center).  Please tell us if you're pregnant--or if there's any chance you might be pregnant. Some surgeries may injure a fetus (unborn  baby), so they require a pregnancy test. Surgeries that are safe for a fetus don't always need a test, and you can choose whether to have one.   Preparing for surgery  Within 10 to 30 days of surgery: Have a pre-op exam (sometimes called an H&P, or History and Physical). This can be done at a clinic or pre-operative center.  If you're having a , you may not need this exam. Talk to your care team.  At your pre-op exam, talk to your care team about all medicines you take. (This includes CBD oil and any drugs, such as THC, marijuana, and other forms of cannabis.) If you need to stop any medicine before surgery, ask when to start taking it again.  This is for your safety. Many medicines and drugs can make you bleed too much during surgery. Some change how well surgery (anesthesia) drugs work.  Call your insurance company to let them know you're having surgery. (If you don't have insurance, call [phone].)  Call your clinic if there's any change in your health. This includes a scrape or scratch near the surgery site, or any signs of a cold (sore throat, runny nose, cough, rash, fever).  Eating and drinking guidelines  For your safety: Unless your surgeon tells you otherwise, follow the guidelines below.  Eat and drink as normal until 8 hours before you arrive for surgery. After that, no food or milk. You can spit out gum when you arrive.  Drink clear liquids until 2 hours before you arrive. These are liquids you can see through, like water, Gatorade, and Propel Water. They also include plain black coffee and tea (no cream or milk).  No alcohol for 24 hours before you arrive. The night before surgery, stop any drinks that contain THC.  If your care team tells you to take medicine on the morning of surgery, it's okay to take it with a sip of water. No other medicines or drugs are allowed (including CBD oil)--follow your care team's instructions.  If you have questions the day of surgery, call your hospital or  surgery center.   Preventing infection  Shower or bathe the night before and the morning of surgery. Follow the instructions your clinic gave you. (If no instructions, use regular soap.)  Don't shave or clip hair near your surgery site. We'll remove the hair if needed.  Don't smoke or vape the morning of surgery. No chewing tobacco for 6 hours before you arrive. A nicotine patch is okay. You may spit out nicotine gum when you arrive.  For some surgeries, the surgeon will tell you to fully quit smoking and nicotine.  We will make every effort to keep you safe from infection. We will:  Clean our hands often with soap and water (or an alcohol-based hand rub).  Clean the skin at your surgery site with a special soap that kills germs.  Give you a special gown to keep you warm. (Cold raises the risk of infection.)  Wear hair covers, masks, gowns, and gloves during surgery.  Give antibiotic medicine, if prescribed. Not all surgeries need this medicine.  What to bring on the day of surgery  Photo ID and insurance card  Copy of your health care directive, if you have one  Glasses and hearing aids (bring cases)  You can't wear contacts during surgery  Inhaler and eye drops, if you use them (tell us about these when you arrive)  CPAP machine or breathing device, if you use them  A few personal items, if spending the night  If you have . . .  A pacemaker, ICD (cardiac defibrillator), or other implant: Bring the ID card.  An implanted stimulator: Bring the remote control.  A legal guardian: Bring a copy of the certified (court-stamped) guardianship papers.  Please remove any jewelry, including body piercings. Leave jewelry and other valuables at home.  If you're going home the day of surgery  You must have a support person drive you home. They should stay with you overnight, and they may need to help with your self-care.  If you don't have a support person, please tells us as soon as possible. We can help.  After surgery  If  it's hard to control your pain or you need more pain medicine, please call your surgeon's office.  Questions?   If you have any questions for your care team, list them here:   ____________________________________________________________________________________________________________________________________________________________________________________________________________________________________________________________  For informational purposes only. Not to replace the advice of your health care provider. Copyright   2003, 2019 [facility]. All rights reserved. Clinically reviewed by Mynor Trejo MD. [employer] [id number] - REV 02/25.

## 2025-06-12 ENCOUNTER — LAB (OUTPATIENT)
Dept: LAB | Facility: OTHER | Age: 65
End: 2025-06-12
Attending: FAMILY MEDICINE
Payer: COMMERCIAL

## 2025-06-12 ENCOUNTER — OFFICE VISIT (OUTPATIENT)
Dept: FAMILY MEDICINE | Facility: OTHER | Age: 65
End: 2025-06-12
Attending: FAMILY MEDICINE
Payer: COMMERCIAL

## 2025-06-12 ENCOUNTER — TELEPHONE (OUTPATIENT)
Dept: FAMILY MEDICINE | Facility: OTHER | Age: 65
End: 2025-06-12

## 2025-06-12 VITALS
DIASTOLIC BLOOD PRESSURE: 70 MMHG | TEMPERATURE: 97.5 F | HEART RATE: 87 BPM | SYSTOLIC BLOOD PRESSURE: 130 MMHG | OXYGEN SATURATION: 99 % | RESPIRATION RATE: 22 BRPM | WEIGHT: 210 LBS | HEIGHT: 65 IN | BODY MASS INDEX: 34.99 KG/M2

## 2025-06-12 DIAGNOSIS — Z01.818 PREOP GENERAL PHYSICAL EXAM: Primary | ICD-10-CM

## 2025-06-12 DIAGNOSIS — I10 ESSENTIAL HYPERTENSION: ICD-10-CM

## 2025-06-12 DIAGNOSIS — M16.12 PRIMARY OSTEOARTHRITIS OF LEFT HIP: ICD-10-CM

## 2025-06-12 DIAGNOSIS — Z01.818 PREOP GENERAL PHYSICAL EXAM: ICD-10-CM

## 2025-06-12 DIAGNOSIS — M19.09 PRIMARY OSTEOARTHRITIS, OTHER SPECIFIED SITE: ICD-10-CM

## 2025-06-12 DIAGNOSIS — D64.9 ANEMIA, UNSPECIFIED TYPE: ICD-10-CM

## 2025-06-12 DIAGNOSIS — Z13.1 SCREENING FOR DIABETES MELLITUS: ICD-10-CM

## 2025-06-12 DIAGNOSIS — E78.5 HYPERLIPIDEMIA, UNSPECIFIED HYPERLIPIDEMIA TYPE: ICD-10-CM

## 2025-06-12 DIAGNOSIS — M85.80 OSTEOPENIA, UNSPECIFIED LOCATION: ICD-10-CM

## 2025-06-12 DIAGNOSIS — E04.1 THYROID NODULE: ICD-10-CM

## 2025-06-12 DIAGNOSIS — Z85.038 PERSONAL HISTORY OF COLON CANCER: ICD-10-CM

## 2025-06-12 DIAGNOSIS — J45.40 MODERATE PERSISTENT ASTHMA WITHOUT COMPLICATION: ICD-10-CM

## 2025-06-12 LAB
ALBUMIN SERPL BCG-MCNC: 4.2 G/DL (ref 3.5–5.2)
ALP SERPL-CCNC: 78 U/L (ref 40–150)
ALT SERPL W P-5'-P-CCNC: 10 U/L (ref 0–50)
ANION GAP SERPL CALCULATED.3IONS-SCNC: 11 MMOL/L (ref 7–15)
AST SERPL W P-5'-P-CCNC: 13 U/L (ref 0–45)
ATRIAL RATE - MUSE: 75 BPM
BASOPHILS # BLD AUTO: 0.1 10E3/UL (ref 0–0.2)
BASOPHILS NFR BLD AUTO: 1 %
BILIRUB SERPL-MCNC: 0.3 MG/DL
BUN SERPL-MCNC: 14.3 MG/DL (ref 8–23)
CALCIUM SERPL-MCNC: 9.2 MG/DL (ref 8.8–10.4)
CHLORIDE SERPL-SCNC: 107 MMOL/L (ref 98–107)
CHOLEST SERPL-MCNC: 181 MG/DL
CREAT SERPL-MCNC: 0.88 MG/DL (ref 0.51–0.95)
DIASTOLIC BLOOD PRESSURE - MUSE: NORMAL MMHG
EGFRCR SERPLBLD CKD-EPI 2021: 72 ML/MIN/1.73M2
EOSINOPHIL # BLD AUTO: 0.3 10E3/UL (ref 0–0.7)
EOSINOPHIL NFR BLD AUTO: 4 %
ERYTHROCYTE [DISTWIDTH] IN BLOOD BY AUTOMATED COUNT: 20.6 % (ref 10–15)
FASTING STATUS PATIENT QL REPORTED: NO
FASTING STATUS PATIENT QL REPORTED: NO
GLUCOSE SERPL-MCNC: 113 MG/DL (ref 70–99)
HCO3 SERPL-SCNC: 22 MMOL/L (ref 22–29)
HCT VFR BLD AUTO: 27.3 % (ref 35–47)
HDLC SERPL-MCNC: 59 MG/DL
HGB BLD-MCNC: 7.5 G/DL (ref 11.7–15.7)
IMM GRANULOCYTES # BLD: 0 10E3/UL
IMM GRANULOCYTES NFR BLD: 0 %
INR PPP: 0.98 (ref 0.85–1.15)
INTERPRETATION ECG - MUSE: NORMAL
LDLC SERPL CALC-MCNC: 94 MG/DL
LYMPHOCYTES # BLD AUTO: 1.3 10E3/UL (ref 0.8–5.3)
LYMPHOCYTES NFR BLD AUTO: 16 %
MCH RBC QN AUTO: 18.2 PG (ref 26.5–33)
MCHC RBC AUTO-ENTMCNC: 27.5 G/DL (ref 31.5–36.5)
MCV RBC AUTO: 66 FL (ref 78–100)
MONOCYTES # BLD AUTO: 0.7 10E3/UL (ref 0–1.3)
MONOCYTES NFR BLD AUTO: 8 %
NEUTROPHILS # BLD AUTO: 5.8 10E3/UL (ref 1.6–8.3)
NEUTROPHILS NFR BLD AUTO: 71 %
NONHDLC SERPL-MCNC: 122 MG/DL
NRBC # BLD AUTO: 0 10E3/UL
NRBC BLD AUTO-RTO: 0 /100
P AXIS - MUSE: 69 DEGREES
PLATELET # BLD AUTO: 427 10E3/UL (ref 150–450)
POTASSIUM SERPL-SCNC: 3.7 MMOL/L (ref 3.4–5.3)
PR INTERVAL - MUSE: 190 MS
PROT SERPL-MCNC: 6.9 G/DL (ref 6.4–8.3)
PROTHROMBIN TIME: 13.1 SECONDS (ref 11.8–14.8)
QRS DURATION - MUSE: 132 MS
QT - MUSE: 440 MS
QTC - MUSE: 491 MS
R AXIS - MUSE: -27 DEGREES
RBC # BLD AUTO: 4.11 10E6/UL (ref 3.8–5.2)
SODIUM SERPL-SCNC: 140 MMOL/L (ref 135–145)
SYSTOLIC BLOOD PRESSURE - MUSE: NORMAL MMHG
T AXIS - MUSE: -1 DEGREES
TRIGL SERPL-MCNC: 140 MG/DL
TSH SERPL DL<=0.005 MIU/L-ACNC: 0.94 UIU/ML (ref 0.3–4.2)
VENTRICULAR RATE- MUSE: 75 BPM
WBC # BLD AUTO: 8.2 10E3/UL (ref 4–11)

## 2025-06-12 PROCEDURE — G0463 HOSPITAL OUTPT CLINIC VISIT: HCPCS

## 2025-06-12 PROCEDURE — 85610 PROTHROMBIN TIME: CPT | Mod: ZL

## 2025-06-12 PROCEDURE — 85025 COMPLETE CBC W/AUTO DIFF WBC: CPT | Mod: ZL

## 2025-06-12 PROCEDURE — 82306 VITAMIN D 25 HYDROXY: CPT | Mod: ZL

## 2025-06-12 PROCEDURE — 82465 ASSAY BLD/SERUM CHOLESTEROL: CPT | Mod: ZL

## 2025-06-12 PROCEDURE — 36415 COLL VENOUS BLD VENIPUNCTURE: CPT | Mod: ZL

## 2025-06-12 PROCEDURE — 84443 ASSAY THYROID STIM HORMONE: CPT | Mod: ZL

## 2025-06-12 PROCEDURE — 84155 ASSAY OF PROTEIN SERUM: CPT | Mod: ZL

## 2025-06-12 RX ORDER — ALBUTEROL SULFATE 90 UG/1
2 INHALANT RESPIRATORY (INHALATION) EVERY 4 HOURS PRN
Qty: 18 G | Refills: 5 | Status: SHIPPED | OUTPATIENT
Start: 2025-06-12

## 2025-06-12 ASSESSMENT — ASTHMA QUESTIONNAIRES
QUESTION_5 LAST FOUR WEEKS HOW WOULD YOU RATE YOUR ASTHMA CONTROL: WELL CONTROLLED
QUESTION_1 LAST FOUR WEEKS HOW MUCH OF THE TIME DID YOUR ASTHMA KEEP YOU FROM GETTING AS MUCH DONE AT WORK, SCHOOL OR AT HOME: A LITTLE OF THE TIME
ACT_TOTALSCORE: 19
QUESTION_2 LAST FOUR WEEKS HOW OFTEN HAVE YOU HAD SHORTNESS OF BREATH: ONCE OR TWICE A WEEK
QUESTION_3 LAST FOUR WEEKS HOW OFTEN DID YOUR ASTHMA SYMPTOMS (WHEEZING, COUGHING, SHORTNESS OF BREATH, CHEST TIGHTNESS OR PAIN) WAKE YOU UP AT NIGHT OR EARLIER THAN USUAL IN THE MORNING: ONCE OR TWICE
QUESTION_4 LAST FOUR WEEKS HOW OFTEN HAVE YOU USED YOUR RESCUE INHALER OR NEBULIZER MEDICATION (SUCH AS ALBUTEROL): TWO OR THREE TIMES PER WEEK

## 2025-06-12 ASSESSMENT — PAIN SCALES - GENERAL: PAINLEVEL_OUTOF10: SEVERE PAIN (7)

## 2025-06-12 NOTE — TELEPHONE ENCOUNTER
Received a call from the lab, they had to cancel the A1c stating that the analyzer was unable to read the amount. Patient is coming back to the clinic on Monday for a redraw.

## 2025-06-12 NOTE — Clinical Note
See referral and note - asap consult please and thank you - I see you are in office so wanted to route to a gen surg.  thanks

## 2025-06-12 NOTE — PROGRESS NOTES
Preoperative Evaluation  [facility] - [place]  [address]  [place] [address]  Phone: [phone]  Primary Provider: Salma Rios MD  Pre-op Performing Provider: Salma Rios MD  Jun 12, 2025 6/12/2025   Surgical Information   What procedure is being done? left hip replacement   [place] or [place] [facility]   Who is doing the procedure / surgery? Dr Jose   Date of surgery / procedure: [id number]   Time of surgery / procedure: tbd   Where do you plan to recover after surgery? at a [facility]     Fax number for surgical facility: Note does not need to be faxed, will be available electronically in Epic.    Assessment & Plan     The proposed surgical procedure is considered INTERMEDIATE risk.    Preop general physical exam  Not approved.  See below - hemoglobin 7.5.  - Comprehensive metabolic panel (BMP + Alb, Alk Phos, ALT, AST, Total. Bili, TP); Future  - Lipid Profile (Chol, Trig, HDL, LDL calc); Future  - CBC with platelets and differential; Future  - Reflex INR; Future  - UA Macroscopic with reflex to Microscopic and Culture; Future  - EKG 12-lead complete w/read - (Clinic Performed)    Primary osteoarthritis of left hip  As above  - Comprehensive metabolic panel (BMP + Alb, Alk Phos, ALT, AST, Total. Bili, TP); Future  - Lipid Profile (Chol, Trig, HDL, LDL calc); Future  - CBC with platelets and differential; Future  - Reflex INR; Future  - UA Macroscopic with reflex to Microscopic and Culture; Future  - EKG 12-lead complete w/read - (Clinic Performed)    Anemia, unspecified type  New - unclear duration.  Normal 1 year ago, then mild anemia after hip replacement, but stabilized 10-11.  Now 7.5.  no noted bleeding.  Asymptomatic.  History of colon cancer s/p excision 2008.  Needs to have colonoscopy, possible upper endoscopy.  Urgent referral to general surgery.  Repeat hemoglobin Monday (today is Thursday).  Assess  stability and need for transfusion.  May need CT - up to surgeon.  Seek care if symptomatic or any noted bleeding.  Stop all Aspirin and Ibuprofen products.  - Hemoglobin; Future  - Adult Gen Surg  Referral    Moderate persistent asthma without complication  stable  - albuterol (VENTOLIN HFA) 108 (90 Base) MCG/ACT inhaler; Inhale 2 puffs into the lungs every 4 hours as needed for shortness of breath.    Osteopenia, unspecified location    Essential hypertension  Stable.    Hyperlipidemia, unspecified hyperlipidemia type  Stable.    Primary osteoarthritis, other specified site  - Reflex INR; Future    Personal history of colon cancer  - Adult Gen Surg  Referral           Risks and Recommendations  The patient has the following additional risks and recommendations for perioperative complications:  Anemia/Bleeding/Clotting:    - Anemia and requires treatment prior to surgery.     Antiplatelet or Anticoagulation Medication Instructions   - aspirin: Discontinue aspirin 7 days prior to procedure to reduce bleeding risk. It should be resumed postoperatively.     Additional Medication Instructions  Take all scheduled medications on the day of surgery EXCEPT for modifications listed below:   - Herbal medications and vitamins: DO NOT TAKE 14 days prior to surgery.   - ACE/ARB/ARNI (lisinopril, enalapril, losartan, valsartan, olmesartan, sacubritril/valsartan) : Continue without modification (e.g., MAC anesthesia, neurosurgery, spine surgery, heart failure, or labile hypertension with risk of hypertension).   - ibuprofen (Advil, Motrin): DO NOT TAKE 1 day before surgery.    - naproxen (Aleve, Naprosyn): DO NOT TAKE 4 days before surgery.    - Topicals: DO NOT TAKE day of surgery.    Recommendation  Patient referred to general surgery for evaluation before surgery. Surgery approval pending completion of consultation, but will likely need to be rescheduled due to time to evaluate and treat.      Subjective    Rosa is a 65 year old, presenting for the following:  Pre-Op Exam        HPI: Patient left hip osteoarthritis.  Planning total hip arthroplasty.          6/12/2025   Pre-Op Questionnaire   Have you ever had a heart attack or stroke? No   Have you ever had surgery on your heart or blood vessels, such as a stent placement, a coronary artery bypass, or surgery on an artery in your head, neck, heart, or legs? No   Do you have chest pain with activity? No   Do you have a history of heart failure? No   Do you currently have a cold, bronchitis or symptoms of other infection? No   Do you have a cough, shortness of breath, or wheezing? (!) YES has asthma and she coughs often; prior covid; air quality   Do you or anyone in your family have previous history of blood clots? (!) YES brother did when he was alive - he had 2 heart valves replaced; was on coumadin   Do you or does anyone in your family have a serious bleeding problem such as prolonged bleeding following surgeries or cuts? No   Have you ever had problems with anemia or been told to take iron pills? No   Have you had any abnormal blood loss such as black, tarry or bloody stools, or abnormal vaginal bleeding? No   Have you ever had a blood transfusion? No   Are you willing to have a blood transfusion if it is medically needed before, during, or after your surgery? Yes   Have you or any of your relatives ever had problems with anesthesia? (!) YES patient has a hard time waking up from anesthesia    Do you have sleep apnea, excessive snoring or daytime drowsiness? No   Do you have any artifical heart valves or other implanted medical devices like a pacemaker, defibrillator, or continuous glucose monitor? No   Do you have artificial joints? (!) YES - right hip; SI joint hardware in place, right   Are you allergic to latex? No     Advance Care Planning    Discussed advance care planning with patient; however, patient declined at this time.    Preoperative Review of     reviewed - no record of controlled substances prescribed.      Status of Chronic Conditions:  See problem list for active medical problems.  Problems all longstanding and stable, except as noted/documented.  See ROS for pertinent symptoms related to these conditions.    ASTHMA - Patient has a longstanding history of moderate-severe Asthma . Patient has been doing well overall noting COUGH and continues on medication regimen consisting of Albuterol, Theophylline, zyrtec, duonebs, nasaonex without adverse reactions or side effects.     HYPERLIPIDEMIA - Patient has a history of Hyperlipidemia not on medication.   Repeat labs today - stable    HYPERTENSION - Patient has longstanding history of HTN , currently denies any symptoms referable to elevated blood pressure. Specifically denies chest pain, palpitations, dyspnea, orthopnea, PND or peripheral edema. Blood pressure readings have been in normal range. Current medication regimen is as listed below. Patient denies any side effects of medication.   Ramipril 10 mg.    OSTEOPENIA - dexa up to date    ANEMIA _ was 10-11 range past few times.  Had hip replaced 4/29/2024.  Hemoglobin 4/22/24 was 12.6, 4/30/24 post op 10.5;  then 6/13/24 10.9.   No noted bleeding.    INSOMNIA - Trazodone - sleeps for 4 hours.     COVID booster - defer today    History of colon cancer - 2008 - s/p colon cancer remove; no bowel excision; no chemo or radiation.  Last scope 2022 - repeat 5 years.  Prior hysteroscopy 2023.   Had remote tubal ligatin.    Patient Active Problem List    Diagnosis Date Noted    S/P total right hip arthroplasty 04/29/2024     Priority: Medium    Osteopenia 03/02/2023     Priority: Medium    Esophagitis 10/13/2022     Priority: Medium    Essential hypertension 10/13/2022     Priority: Medium    Hyperlipidemia 10/13/2022     Priority: Medium    MS (multiple sclerosis) (H) 10/13/2022     Priority: Medium     history of multiple sclerosis, but has been in  remission for years, not on current treatment.        Thyroid nodule 10/13/2022     Priority: Medium     Jan 2022 - stable nodules with recommendations to recheck in 1 year.        Class 2 obesity due to excess calories without serious comorbidity with body mass index (BMI) of 36.0 to 36.9 in adult 10/13/2022     Priority: Medium    Lumbar spondylosis 06/08/2022     Priority: Medium     S/p Left Radiofrequency percutaneous neurotomy of the L3, L4 medial branches and the L5 dorsal ramus, to cover the facet joints of L4/L5 and L5/S1.         History of osteomyelitis 01/06/2022     Priority: Medium     Of jaw, secondary to tooth abscess. S/p IV abx and PICC. Had 3 surgeries and lost 7 teeth. Follow up with ID as needed.      Post concussive syndrome 12/21/2020     Priority: Medium     history of a fall at work on12/26/2019, and sustained a traumatic brain injury and has chronic headaches and postconcussive syndrome. She was taking the garbage out and hit a patch of ice and hit her buttock and her head. Did do occupational therapy and see neurooptics and had prism glasses.        Vasomotor rhinitis 06/29/2018     Priority: Medium    Diverticulosis 07/25/2017     Priority: Medium    Personal history of colon polyps, unspecified 07/25/2017     Priority: Medium     2/24/22 - rpt in five years       Dysphonia 10/21/2014     Priority: Medium    Moderate persistent asthma without complication 06/18/2012     Priority: Medium     on theophylline. She was seen by Dr. Moreno for her asthma in the past and this was recommended by him. Her symptoms are tirggered by heat and humidity. She uses ventolin about once per day and feels symptoms.        Tubular adenoma 03/09/2009     Priority: Medium     On colonoscopy 2009.  Repeat colonoscopy in 1 year per GI Recommendations (see note).        Past Medical History:   Diagnosis Date    Asthma     Benign essential hypertension     Colon cancer (H) 2008    Osteopenia     2023 frax 7.5/0.6%     Post concussive syndrome     Thyroid nodule     4; repeat annual ultrsound     Past Surgical History:   Procedure Laterality Date    ARTHROPLASTY HIP ANTERIOR Right 04/29/2024    Procedure: Right Direct Anterior Total Hip Arthroplasty;  Surgeon: Kirill Jose MD;  Location: [facility] OR    COLONOSCOPY - HIM SCAN  02/24/2022    repeat 5 years; [facility]    DILATION AND CURETTAGE, OPERATIVE HYSTEROSCOPY, COMBINED N/A 12/13/2023    Procedure: HYSTEROSCOPY, WITH DILATION AND CURETTAGE OF UTERUS;  Surgeon: Bakari Lott MD;  Location: [facility] OR    IR PICC PLACEMENT > 5 YRS OF AGE  01/05/2022    SALPINGO-OOPHORECTOMY BILATERAL Bilateral 12/13/2023    Procedure: LAPAROSCOPIC SALPINGO-OOPHORECTOMY, BILATERAL and peritoneal biopsies;  Surgeon: Bakari Lott MD;  Location: [facility] OR     Current Outpatient Medications   Medication Sig Dispense Refill    albuterol (VENTOLIN HFA) 108 (90 Base) MCG/ACT inhaler INHALE 2 PUFFS INTO THE LUNGS EVERY 4 HOURS AS NEEDED FOR SHORTNESS OF BREATH / DYSPNEA OR WHEEZING 18 g 5    ALEVE 220 MG tablet Take 220 mg by mouth daily as needed (pain) (averages one time weekly)      aspirin (ASPIRIN EC ADULT LOW DOSE) 81 MG EC tablet Take 1 tablet (81 mg) by mouth 2 times daily. (Patient taking differently: Take 81 mg by mouth daily.) 180 tablet 3    CALCIUM + VITAMIN D3 600-5 MG-MCG TABS TAKE 1 TABLET BY MOUTH EVERY MORNING 90 tablet 0    cetirizine (ZYRTEC) 10 MG tablet Take 1 tablet by mouth every morning      fish oil-omega-3 fatty acids 1000 MG capsule Take 1 g by mouth daily      folic acid (FOLVITE) 400 MCG tablet Take 400 mcg by mouth every morning      ipratropium - albuterol 0.5 mg/2.5 mg/3 mL (DUONEB) 0.5-2.5 (3) MG/3ML neb solution Take 1 vial (3 mLs) by nebulization every 6 hours as needed for shortness of breath, wheezing or cough. 90 mL 0    Lutein 10 MG TABS Take 20 mg by mouth every morning      magnesium 100 MG CAPS Take 1 capsule by mouth at bedtime (for sleep)      mometasone (NASONEX)  50 MCG/ACT nasal spray Spray 2 sprays into both nostrils daily as needed (allergies) (OTC unscented)      Probiotic Product (PROBIOTIC PO) Take 1 capsule by mouth daily      ramipril (ALTACE) 10 MG capsule TAKE 1 CAPSULE BY MOUTH DAILY 90 capsule 1    theophylline (UNIPHYL) 400 MG 24 hr tablet Take 1 tablet (400 mg) by mouth daily. 90 tablet 1    traZODone (DESYREL) 100 MG tablet TAKE 1 TABLET BY MOUTH DAILY AT BEDTIME 90 tablet 1    VITAMIN A PO Take 1 capsule by mouth every morning      zinc 50 MG TABS Take 1 tablet by mouth daily (will take an extra dose as needed for cold symptoms)         Allergies   Allergen Reactions    Codeine Shortness Of Breath and Swelling    Fentanyl Other (See Comments)     Bradycardia & Hypotension    Meloxicam Other (See Comments)     Hypersensitive, SOB, insomnia, HTN    No Clinical Screening - See Comments Other (See Comments) and Shortness Of Breath     MOLD, GRASSES. Some cleaning products, perfumes, tar,  Skunk scent    Prochlorperazine Hives, Shortness Of Breath and Swelling     COMPAZINE      Shellfish Allergy Hives, Shortness Of Breath and Swelling    Amlodipine Other (See Comments)     Hip pain and edema.    Cyclobenzaprine Other (See Comments)     Jittery, leg cramps, insomnia,     Losartan Cough, Itching and Muscle Pain (Myalgia)    Fluticasone Swelling    Oxycodone Headache and Nausea    Budesonide-Formoterol Fumarate Other (See Comments) and Palpitations     Leg pains        Social History     Tobacco Use    Smoking status: Former     Current packs/day: 0.00     Average packs/day: 2.0 packs/day for 14.0 years (27.9 ttl pk-yrs)     Types: Cigarettes     Start date: 1970     Quit date: 1977     Years since quittin.4     Passive exposure: Past    Smokeless tobacco: Never   Substance Use Topics    Alcohol use: Not Currently     Family History   Problem Relation Age of Onset    Alzheimer Disease Mother     Alcoholism Mother     Myocardial Infarction Mother      "Thrombosis Mother     Cerebrovascular Disease Father     Hearing Loss Father     Myocardial Infarction Father     Hypertension Sister     Diabetes Sister     Valvular heart disease Sister     Aortic Valve Replacement Sister     Hypertension Sister     Breast Cancer Sister 63    Macular Degeneration Sister     Hypertension Sister     Osteopenia Sister     Valvular heart disease Brother     Cerebrovascular Disease Brother     Cancer Brother     Hypertension Brother     Skin Cancer Brother     Osteopenia Brother      History   Drug Use Unknown             Review of Systems  Constitutional, HEENT, cardiovascular, pulmonary, gi and gu systems are negative, except as otherwise noted.    Objective    /70 (BP Location: Right arm, Patient Position: Sitting, Cuff Size: Adult Large)   Pulse 87   Temp 97.5  F (36.4  C) (Tympanic)   Resp 22   Ht 1.651 m (5' 5\")   Wt 95.3 kg (210 lb)   SpO2 99%   BMI 34.95 kg/m     Estimated body mass index is 34.95 kg/m  as calculated from the following:    Height as of this encounter: 1.651 m (5' 5\").    Weight as of this encounter: 95.3 kg (210 lb).  Physical Exam  GENERAL: alert and no distress  EYES: Eyes grossly normal to inspection, PERRL and conjunctivae and sclerae normal  HENT: ear canals and TM's normal, nose and mouth without ulcers or lesions  NECK: no adenopathy, no asymmetry, masses, or scars  RESP: lungs clear to auscultation - no rales, rhonchi or wheezes  CV: regular rate and rhythm, normal S1 S2, no S3 or S4, no murmur, click or rub, no peripheral edema  ABDOMEN: tenderness LLQ, no organomegaly or masses, and bowel sounds normal  MS: no gross musculoskeletal defects noted, no edema  SKIN: no suspicious lesions or rashes  NEURO: Normal strength and tone, mentation intact and speech normal  PSYCH: mentation appears normal, affect normal/bright    Recent Labs   Lab Test 06/12/25  1403 06/13/24  1605   HGB 7.5* 10.9*     --    NA  --  140   POTASSIUM  --  4.0 "   CR  --  1.14*        Diagnostics  Recent Results (from the past 24 hours)   CBC with platelets and differential    Collection Time: 06/12/25  2:03 PM   Result Value Ref Range    WBC Count 8.2 4.0 - 11.0 10e3/uL    RBC Count 4.11 3.80 - 5.20 10e6/uL    Hemoglobin 7.5 (L) 11.7 - 15.7 g/dL    Hematocrit 27.3 (L) 35.0 - 47.0 %    MCV 66 (L) 78 - 100 fL    MCH 18.2 (L) 26.5 - 33.0 pg    MCHC 27.5 (L) 31.5 - 36.5 g/dL    RDW 20.6 (H) 10.0 - 15.0 %    Platelet Count 427 150 - 450 10e3/uL    % Neutrophils 71 %    % Lymphocytes 16 %    % Monocytes 8 %    % Eosinophils 4 %    % Basophils 1 %    % Immature Granulocytes 0 %    NRBCs per 100 WBC 0 <1 /100    Absolute Neutrophils 5.8 1.6 - 8.3 10e3/uL    Absolute Lymphocytes 1.3 0.8 - 5.3 10e3/uL    Absolute Monocytes 0.7 0.0 - 1.3 10e3/uL    Absolute Eosinophils 0.3 0.0 - 0.7 10e3/uL    Absolute Basophils 0.1 0.0 - 0.2 10e3/uL    Absolute Immature Granulocytes 0.0 <=0.4 10e3/uL    Absolute NRBCs 0.0 10e3/uL      EKG: appears normal, NSR, normal axis, normal intervals, no acute ST/T changes c/w ischemia, no LVH by voltage criteria, Right Bundle Branch Block, unchanged from previous tracings    Revised Cardiac Risk Index (RCRI)  The patient has the following serious cardiovascular risks for perioperative complications:   - No serious cardiac risks = 0 points     RCRI Interpretation: 0 points: Class I (very low risk - 0.4% complication rate)  The longitudinal plan of care for the diagnosis(es)/condition(s) as documented were addressed during this visit. Due to the added complexity in care, I will continue to support Rosa in the subsequent management and with ongoing continuity of care.       Signed Electronically by: Salma Rios MD  A copy of this evaluation report is provided to the requesting physician.

## 2025-06-13 ENCOUNTER — PREP FOR PROCEDURE (OUTPATIENT)
Dept: SURGERY | Facility: OTHER | Age: 65
End: 2025-06-13

## 2025-06-13 DIAGNOSIS — D64.9 ANEMIA: Primary | ICD-10-CM

## 2025-06-13 LAB — VIT D+METAB SERPL-MCNC: 34 NG/ML (ref 20–50)

## 2025-06-16 ENCOUNTER — LAB (OUTPATIENT)
Dept: LAB | Facility: OTHER | Age: 65
End: 2025-06-16
Payer: COMMERCIAL

## 2025-06-16 DIAGNOSIS — Z01.818 PREOP GENERAL PHYSICAL EXAM: ICD-10-CM

## 2025-06-16 DIAGNOSIS — Z13.1 SCREENING FOR DIABETES MELLITUS: ICD-10-CM

## 2025-06-16 DIAGNOSIS — D64.9 ANEMIA, UNSPECIFIED TYPE: ICD-10-CM

## 2025-06-16 LAB
ALBUMIN UR-MCNC: 10 MG/DL
APPEARANCE UR: CLEAR
BILIRUB UR QL STRIP: NEGATIVE
COLOR UR AUTO: YELLOW
CREAT UR-MCNC: 205.4 MG/DL
EST. AVERAGE GLUCOSE BLD GHB EST-MCNC: 100 MG/DL
GLUCOSE UR STRIP-MCNC: NEGATIVE MG/DL
HBA1C MFR BLD: 5.1 %
HGB BLD-MCNC: 7.7 G/DL (ref 11.7–15.7)
HGB UR QL STRIP: NEGATIVE
KETONES UR STRIP-MCNC: NEGATIVE MG/DL
LEUKOCYTE ESTERASE UR QL STRIP: NEGATIVE
MCV RBC AUTO: 66 FL (ref 78–100)
MICROALBUMIN UR-MCNC: 65.5 MG/L
MICROALBUMIN/CREAT UR: 31.89 MG/G CR (ref 0–25)
MUCOUS THREADS #/AREA URNS LPF: PRESENT /LPF
NITRATE UR QL: NEGATIVE
PH UR STRIP: 5.5 [PH] (ref 4.7–8)
RBC URINE: <1 /HPF
SP GR UR STRIP: 1.02 (ref 1–1.03)
SQUAMOUS EPITHELIAL: 0 /HPF
UROBILINOGEN UR STRIP-MCNC: NORMAL MG/DL
WBC URINE: 1 /HPF

## 2025-06-16 PROCEDURE — 36415 COLL VENOUS BLD VENIPUNCTURE: CPT | Mod: ZL

## 2025-06-16 PROCEDURE — 81003 URINALYSIS AUTO W/O SCOPE: CPT | Mod: ZL

## 2025-06-16 PROCEDURE — 82570 ASSAY OF URINE CREATININE: CPT | Mod: ZL

## 2025-06-16 PROCEDURE — 85018 HEMOGLOBIN: CPT | Mod: ZL

## 2025-06-16 PROCEDURE — 83036 HEMOGLOBIN GLYCOSYLATED A1C: CPT | Mod: ZL

## 2025-06-18 DIAGNOSIS — R10.32 LLQ ABDOMINAL PAIN: Primary | ICD-10-CM

## 2025-06-19 ENCOUNTER — TELEPHONE (OUTPATIENT)
Dept: SURGERY | Facility: OTHER | Age: 65
End: 2025-06-19

## 2025-06-19 NOTE — TELEPHONE ENCOUNTER
----- Message from Tanvi AGUILAR sent at 6/19/2025  2:12 PM CDT -----  Regarding: FW: preop  Dr. Masterson:  Yes -she should proceed with colonoscopy.   Her other surgery was cancelled because she needs the colonoscopy to evaluate the anemia.   Tanvi AGUILAR LPN  ----- Message -----  From: Salma Masterson MD  Sent: 6/17/2025  10:09 AM CDT  To: Tanvi Liu LPN  Subject: RE: preop                                        Yes -she should proceed with colonoscopy.  Her other surgery was cancelled because she needs the colonoscopy to evaluate the anemia.  ----- Message -----  From: Tanvi Liu LPN  Sent: 6/16/2025   8:50 AM CDT  To: Salma Masterson MD  Subject: FW: preop                                          ----- Message -----  From: Fátima Eldridge LPN  Sent: 6/13/2025   3:12 PM CDT  To:  Family Care Team 1  Subject: preop                                            Patient had a preop for a different procedure on 6-12-25 and anemia was discovered,   She is scheduled for EGD COLON on July 2nd  for anemia (within 30 days of appt)  Would the preop on June 12 pass patient for this procedure   If another  preop appointment is needed please have your team contact patient to schedule    Thank you form the general surgery team

## 2025-06-23 DIAGNOSIS — D64.9 ANEMIA, UNSPECIFIED TYPE: Primary | ICD-10-CM

## 2025-06-25 ENCOUNTER — LAB (OUTPATIENT)
Dept: LAB | Facility: OTHER | Age: 65
End: 2025-06-25
Payer: COMMERCIAL

## 2025-06-25 ENCOUNTER — RESULTS FOLLOW-UP (OUTPATIENT)
Dept: FAMILY MEDICINE | Facility: OTHER | Age: 65
End: 2025-06-25

## 2025-06-25 DIAGNOSIS — D64.9 ANEMIA, UNSPECIFIED TYPE: Primary | ICD-10-CM

## 2025-06-25 DIAGNOSIS — D64.9 ANEMIA, UNSPECIFIED TYPE: ICD-10-CM

## 2025-06-25 LAB
BASOPHILS # BLD AUTO: 0.1 10E3/UL (ref 0–0.2)
BASOPHILS NFR BLD AUTO: 1 %
EOSINOPHIL # BLD AUTO: 0.1 10E3/UL (ref 0–0.7)
EOSINOPHIL NFR BLD AUTO: 2 %
ERYTHROCYTE [DISTWIDTH] IN BLOOD BY AUTOMATED COUNT: 19.9 % (ref 10–15)
HCT VFR BLD AUTO: 28.3 % (ref 35–47)
HGB BLD-MCNC: 7.6 G/DL (ref 11.7–15.7)
IMM GRANULOCYTES # BLD: 0 10E3/UL
IMM GRANULOCYTES NFR BLD: 0 %
LYMPHOCYTES # BLD AUTO: 1.1 10E3/UL (ref 0.8–5.3)
LYMPHOCYTES NFR BLD AUTO: 15 %
MCH RBC QN AUTO: 17.6 PG (ref 26.5–33)
MCHC RBC AUTO-ENTMCNC: 26.9 G/DL (ref 31.5–36.5)
MCV RBC AUTO: 66 FL (ref 78–100)
MONOCYTES # BLD AUTO: 0.7 10E3/UL (ref 0–1.3)
MONOCYTES NFR BLD AUTO: 10 %
NEUTROPHILS # BLD AUTO: 5.5 10E3/UL (ref 1.6–8.3)
NEUTROPHILS NFR BLD AUTO: 73 %
NRBC # BLD AUTO: 0 10E3/UL
NRBC BLD AUTO-RTO: 0 /100
PLATELET # BLD AUTO: 447 10E3/UL (ref 150–450)
RBC # BLD AUTO: 4.32 10E6/UL (ref 3.8–5.2)
WBC # BLD AUTO: 7.6 10E3/UL (ref 4–11)

## 2025-06-25 PROCEDURE — 36415 COLL VENOUS BLD VENIPUNCTURE: CPT | Mod: ZL

## 2025-06-25 PROCEDURE — 85025 COMPLETE CBC W/AUTO DIFF WBC: CPT | Mod: ZL

## 2025-06-26 ENCOUNTER — TELEPHONE (OUTPATIENT)
Dept: SURGERY | Facility: OTHER | Age: 65
End: 2025-06-26

## 2025-06-26 NOTE — TELEPHONE ENCOUNTER
Pt calling to ask if she needs a transfusion prior to EGD+Colon on 7/2    Routed to Surgeon to advise  _______________________________  Snippet of PcP's note below:  Salma Masterson MD   6/25/25  2:44 PM  Result Note  Notify patient -hemoglobin is stable at 7.6.  Sent a note to her surgeon about upcoming colonoscopy to see if transfusion needed prior.

## 2025-06-27 ENCOUNTER — HOSPITAL ENCOUNTER (OUTPATIENT)
Dept: CT IMAGING | Facility: HOSPITAL | Age: 65
Discharge: HOME | End: 2025-06-27
Attending: FAMILY MEDICINE | Admitting: RADIOLOGY
Payer: COMMERCIAL

## 2025-06-27 ENCOUNTER — RESULTS FOLLOW-UP (OUTPATIENT)
Dept: FAMILY MEDICINE | Facility: OTHER | Age: 65
End: 2025-06-27

## 2025-06-27 ENCOUNTER — ANESTHESIA EVENT (OUTPATIENT)
Dept: SURGERY | Facility: HOSPITAL | Age: 65
End: 2025-06-27
Payer: COMMERCIAL

## 2025-06-27 DIAGNOSIS — R10.32 LLQ ABDOMINAL PAIN: ICD-10-CM

## 2025-06-27 DIAGNOSIS — D64.9 ANEMIA, UNSPECIFIED TYPE: ICD-10-CM

## 2025-06-27 PROCEDURE — 74177 CT ABD & PELVIS W/CONTRAST: CPT | Mod: 26 | Performed by: RADIOLOGY

## 2025-06-27 PROCEDURE — 74177 CT ABD & PELVIS W/CONTRAST: CPT

## 2025-06-27 PROCEDURE — 250N000011 HC RX IP 250 OP 636: Performed by: RADIOLOGY

## 2025-06-27 RX ORDER — IOPAMIDOL 755 MG/ML
103 INJECTION, SOLUTION INTRAVASCULAR ONCE
Status: COMPLETED | OUTPATIENT
Start: 2025-06-27 | End: 2025-06-27

## 2025-06-27 RX ADMIN — IOPAMIDOL 103 ML: 755 INJECTION, SOLUTION INTRAVENOUS at 14:41

## 2025-06-27 ASSESSMENT — LIFESTYLE VARIABLES: TOBACCO_USE: 1

## 2025-06-27 NOTE — ANESTHESIA PREPROCEDURE EVALUATION
Anesthesia Pre-Procedure Evaluation    Patient: Rosa Alcocer   MRN: [id number] : 1960          Procedure : Procedure(s):  COLONOSCOPY, WITH UPPER GASTROINTESTINAL TRACT ENDOSCOPY         Past Medical History:   Diagnosis Date    Asthma     Benign essential hypertension     Colon cancer (H)     Osteopenia      frax 7.5/0.6%    Post concussive syndrome     Thyroid nodule     4; repeat annual ultrsound      Past Surgical History:   Procedure Laterality Date    ARTHROPLASTY HIP ANTERIOR Right 2024    Procedure: Right Direct Anterior Total Hip Arthroplasty;  Surgeon: Kirill Jose MD;  Location: [facility]    COLONOSCOPY - HIM SCAN  2022    repeat 5 years; [facility]    DILATION AND CURETTAGE, OPERATIVE HYSTEROSCOPY, COMBINED N/A 2023    Procedure: HYSTEROSCOPY, WITH DILATION AND CURETTAGE OF UTERUS;  Surgeon: Bakari Lott MD;  Location: [facility]    IR PICC PLACEMENT > 5 YRS OF AGE  2022    SALPINGO-OOPHORECTOMY BILATERAL Bilateral 2023    Procedure: LAPAROSCOPIC SALPINGO-OOPHORECTOMY, BILATERAL and peritoneal biopsies;  Surgeon: Bakari Lott MD;  Location: [facility]      Allergies   Allergen Reactions    Codeine Shortness Of Breath and Swelling    Fentanyl Other (See Comments)     Bradycardia & Hypotension    Meloxicam Other (See Comments)     Hypersensitive, SOB, insomnia, HTN    No Clinical Screening - See Comments Other (See Comments) and Shortness Of Breath     MOLD, GRASSES. Some cleaning products, perfumes, tar,  Skunk scent    Prochlorperazine Hives, Shortness Of Breath and Swelling     COMPAZINE      Shellfish Allergy Hives, Shortness Of Breath and Swelling    Amlodipine Other (See Comments)     Hip pain and edema.    Cyclobenzaprine Other (See Comments)     Jittery, leg cramps, insomnia,     Losartan Cough, Itching and Muscle Pain (Myalgia)    Fluticasone Swelling    Oxycodone Headache and Nausea    Budesonide-Formoterol Fumarate Other (See Comments) and Palpitations      Leg pains      Social History     Tobacco Use    Smoking status: Former     Current packs/day: 0.00     Average packs/day: 2.0 packs/day for 14.0 years (27.9 ttl pk-yrs)     Types: Cigarettes     Start date: 1970     Quit date: 1977     Years since quittin.5     Passive exposure: Past    Smokeless tobacco: Never   Substance Use Topics    Alcohol use: Not Currently      Wt Readings from Last 1 Encounters:   25 95.3 kg (210 lb)        Anesthesia Evaluation   Pt has had prior anesthetic. Type: MAC.    History of anesthetic complications  - .  Patient does not wake up easily and states that she can't have Fentanyl-was related to a colonoscopy done in [facility] (per 2024 notes).    ROS/MED HX  ENT/Pulmonary: Comment: Dysphonia  Vasomotor rhinitis    (+)     VIBHA risk factors,  hypertension, obese,        tobacco use (quit ), Past use,  28  Pack-Year Hx,   Moderate Persistent, asthma  Treatment: Inhaler prn and Nebulizer prn,       recent URI, resolved, 25 - doxycycline for non-improving bronchitis:        Neurologic: Comment: Lumbar spondylosis  Insomnia     S/p Left Radiofrequency percutaneous neurotomy of the L3, L4 medial branches and the L5 dorsal ramus, to cover the facet joints of L4/L5 and L5/S1    2019 fell on ice - TBI, chronic headaches and postconcussive syndrome    (+)                   Multiple Sclerosis, limitations: Per 2024: remission for years, not on current treatment-no relaspe for 5/6 years per patient.            Cardiovascular: Comment: Mild tricuspid & aortic regurgitation 2006         (+) Dyslipidemia hypertension-range: ramipril (130/70 on 25)/ -   -  - -   Taking blood thinners (aspirin) Pt has received instructions: Instructions Given to patient: asa - instructed to stop 25.                            Previous cardiac testing   Echo: Date: 2006 Results:  Transthoracic Echo  CONCLUSION:  1. Normal left ventricular size and function.  2.  Mild aortic and tricuspid regurgitation.  3. Normal right-sided cardiac pressures by Doppler.  4. Right-sided cardiac chambers do not appear significantly enlarged on this study     Per cards note 2006: stress echocardiogram that did suggest some right-sided cardiac  chamber enlargement, but otherwise the left ventricle was normal size with  normal function and no evidence of ischemia     Stress Test:  Date: Results:    ECG Reviewed:  Date: 6/12/25 Results:  HR 75  Sinus rhythm   Right bundle branch block   Minimal voltage criteria for LVH, may be normal variant ( R in aVL )   Abnormal ECG   When compared with ECG of 22-Apr-2024 14:37,   No significant change was found   Confirmed by MD Paola, Kenan ([id number]) on 6/13/2025 12:31:04 PM       Cath:  Date: Results:      METS/Exercise Tolerance:     Hematologic: Comments: Family hx of blood clots prior to their heart valve replacement.  No noted personal history of blood clots.    Hgb 6/12/25: 7.5          6/16/25: 7.7          6/25/25: 7.6    (+)      anemia,          Musculoskeletal: Comment: Chronic left hip pain  Osteopenia (on fosamax)  History of osteomyelitis-Jaw  (+)  arthritis,          Muscular Dystrophy: right hip.   GI/Hepatic: Comment: Diverticulosis    (+)   esophageal disease,   hiatal hernia,  bowel prep,            Renal/Genitourinary:  - neg Renal ROS     Endo:     (+)          thyroid problem, hypothyroidism nodule,    Obesity,       Psychiatric/Substance Use:  - neg psychiatric ROS     Infectious Disease:  - neg infectious disease ROS     Malignancy: Comment: Hx colon cancer  (+) Malignancy, History of GI.GI CA  Remission status post Surgery.      Other:  - neg other ROS    (+)  , H/O Chronic Pain,           Physical Exam  Airway  Mallampati: I  Neck ROM: full  Mouth opening: >= 4 cm    Cardiovascular   Rhythm: regular  Rate: normal rate   Comments: Mild tricuspid & aortic regurgitation 12/22/2006       Dental   (+) Modest Abnormalities -  "crowns, retainers, 1 or 2 missing teeth   dental implants, bridges or caps present   Pulmonary (+) decreased breath sounds   decreased breath sounds     Neurological   She appears awake, alert and oriented x3.    Other Findings       OUTSIDE LABS:  CBC:   Lab Results   Component Value Date    WBC 7.6 06/25/2025    WBC 8.2 06/12/2025    HGB 7.6 (L) 06/25/2025    HGB 7.7 (L) 06/16/2025    HCT 28.3 (L) 06/25/2025    HCT 27.3 (L) 06/12/2025     06/25/2025     06/12/2025     BMP:   Lab Results   Component Value Date     06/12/2025     06/13/2024    POTASSIUM 3.7 06/12/2025    POTASSIUM 4.0 06/13/2024    CHLORIDE 107 06/12/2025    CHLORIDE 105 06/13/2024    CO2 22 06/12/2025    CO2 22 06/13/2024    BUN 14.3 06/12/2025    BUN 19.9 06/13/2024    CR 0.88 06/12/2025    CR 1.14 (H) 06/13/2024     (H) 06/12/2025     (H) 06/13/2024     COAGS:   Lab Results   Component Value Date    PTT 29 04/22/2024    INR 0.98 06/12/2025     POC: No results found for: \"BGM\", \"HCG\", \"HCGS\"  HEPATIC:   Lab Results   Component Value Date    ALBUMIN 4.2 06/12/2025    PROTTOTAL 6.9 06/12/2025    ALT 10 06/12/2025    AST 13 06/12/2025    ALKPHOS 78 06/12/2025    BILITOTAL 0.3 06/12/2025     OTHER:   Lab Results   Component Value Date    A1C 5.1 06/16/2025    DARY 9.2 06/12/2025    TSH 0.94 06/12/2025       Anesthesia Plan    ASA Status:  3      NPO Status: NPO Appropriate   Anesthesia Type: MAC.  Induction: intravenous.  Maintenance: TIVA.   Techniques and Equipment:       - Monitoring Plan: standard ASA monitoring     Consents    Anesthesia Plan(s) and associated risks, benefits, and realistic alternatives discussed. Questions answered and patient/representative(s) expressed understanding.     - Discussed: CRNA     - Discussed with:  Patient        - Pt is DNR/DNI Status: no DNR     Blood Consent:      - Discussed with: patient.     Postoperative Care         Comments:    Other Comments: Reviewed chart and 6/12 " "[facility] (was for left hip replacement, but that was cancelled due to findings of anemia)    6/25/25 msg from [facility] to surgeon asking if transfusion needed prior to sx (Hgb 7.6)  6/27/25 - no transfusion prior to scope, but may need one prior to ortho surgery coming up                   JOSUE Perez CNP    I have reviewed the pertinent notes and labs in the chart from the past 30 days and (re)examined the patient.  Any updates or changes from those notes are reflected in this note.    Clinically Significant Risk Factors Present on Admission                   # Hypertension: Noted on problem list           # Obesity: Estimated body mass index is 34.95 kg/m  as calculated from the following:    Height as of 6/13/25: 1.651 m (5' 5\").    Weight as of 6/13/25: 95.3 kg (210 lb).       # Asthma: noted on problem list              "

## 2025-07-02 ENCOUNTER — LAB (OUTPATIENT)
Dept: LAB | Facility: HOSPITAL | Age: 65
End: 2025-07-02
Attending: SURGERY
Payer: COMMERCIAL

## 2025-07-02 ENCOUNTER — RESULTS FOLLOW-UP (OUTPATIENT)
Dept: FAMILY MEDICINE | Facility: OTHER | Age: 65
End: 2025-07-02

## 2025-07-02 ENCOUNTER — ANESTHESIA (OUTPATIENT)
Dept: SURGERY | Facility: HOSPITAL | Age: 65
End: 2025-07-02
Payer: COMMERCIAL

## 2025-07-02 ENCOUNTER — HOSPITAL ENCOUNTER (OUTPATIENT)
Facility: HOSPITAL | Age: 65
Discharge: HOME | End: 2025-07-02
Attending: SURGERY | Admitting: SURGERY
Payer: COMMERCIAL

## 2025-07-02 VITALS
WEIGHT: 205.4 LBS | HEART RATE: 68 BPM | RESPIRATION RATE: 16 BRPM | OXYGEN SATURATION: 98 % | DIASTOLIC BLOOD PRESSURE: 65 MMHG | BODY MASS INDEX: 34.22 KG/M2 | SYSTOLIC BLOOD PRESSURE: 121 MMHG | TEMPERATURE: 97.5 F | HEIGHT: 65 IN

## 2025-07-02 DIAGNOSIS — D64.9 ANEMIA, UNSPECIFIED TYPE: ICD-10-CM

## 2025-07-02 LAB
HGB BLD-MCNC: 7.8 G/DL (ref 11.7–15.7)
MCV RBC AUTO: 64 FL (ref 78–100)

## 2025-07-02 PROCEDURE — 272N000001 HC OR GENERAL SUPPLY STERILE: Performed by: SURGERY

## 2025-07-02 PROCEDURE — 85018 HEMOGLOBIN: CPT

## 2025-07-02 PROCEDURE — 999N000141 HC STATISTIC PRE-PROCEDURE NURSING ASSESSMENT: Performed by: SURGERY

## 2025-07-02 PROCEDURE — G0105 COLORECTAL SCRN; HI RISK IND: HCPCS | Performed by: SURGERY

## 2025-07-02 PROCEDURE — 88305 TISSUE EXAM BY PATHOLOGIST: CPT | Mod: TC | Performed by: SURGERY

## 2025-07-02 PROCEDURE — 370N000017 HC ANESTHESIA TECHNICAL FEE, PER MIN: Performed by: SURGERY

## 2025-07-02 PROCEDURE — 258N000003 HC RX IP 258 OP 636: Performed by: NURSE PRACTITIONER

## 2025-07-02 PROCEDURE — 36415 COLL VENOUS BLD VENIPUNCTURE: CPT

## 2025-07-02 PROCEDURE — 250N000009 HC RX 250: Performed by: NURSE ANESTHETIST, CERTIFIED REGISTERED

## 2025-07-02 PROCEDURE — 43239 EGD BIOPSY SINGLE/MULTIPLE: CPT | Performed by: SURGERY

## 2025-07-02 PROCEDURE — 88305 TISSUE EXAM BY PATHOLOGIST: CPT | Mod: 26 | Performed by: PATHOLOGY

## 2025-07-02 PROCEDURE — 360N000075 HC SURGERY LEVEL 2, PER MIN: Performed by: SURGERY

## 2025-07-02 PROCEDURE — 710N000012 HC RECOVERY PHASE 2, PER MINUTE: Performed by: SURGERY

## 2025-07-02 PROCEDURE — 250N000011 HC RX IP 250 OP 636: Performed by: NURSE ANESTHETIST, CERTIFIED REGISTERED

## 2025-07-02 RX ORDER — ONDANSETRON 4 MG/1
4 TABLET, ORALLY DISINTEGRATING ORAL EVERY 30 MIN PRN
Status: DISCONTINUED | OUTPATIENT
Start: 2025-07-02 | End: 2025-07-02 | Stop reason: HOSPADM

## 2025-07-02 RX ORDER — LIDOCAINE HYDROCHLORIDE 20 MG/ML
INJECTION, SOLUTION INFILTRATION; PERINEURAL PRN
Status: DISCONTINUED | OUTPATIENT
Start: 2025-07-02 | End: 2025-07-02

## 2025-07-02 RX ORDER — SODIUM CHLORIDE, SODIUM LACTATE, POTASSIUM CHLORIDE, CALCIUM CHLORIDE 600; 310; 30; 20 MG/100ML; MG/100ML; MG/100ML; MG/100ML
INJECTION, SOLUTION INTRAVENOUS CONTINUOUS
Status: DISCONTINUED | OUTPATIENT
Start: 2025-07-02 | End: 2025-07-02 | Stop reason: HOSPADM

## 2025-07-02 RX ORDER — LIDOCAINE 40 MG/G
CREAM TOPICAL
Status: DISCONTINUED | OUTPATIENT
Start: 2025-07-02 | End: 2025-07-02 | Stop reason: HOSPADM

## 2025-07-02 RX ORDER — PROPOFOL 10 MG/ML
INJECTION, EMULSION INTRAVENOUS CONTINUOUS PRN
Status: DISCONTINUED | OUTPATIENT
Start: 2025-07-02 | End: 2025-07-02

## 2025-07-02 RX ORDER — ONDANSETRON 2 MG/ML
4 INJECTION INTRAMUSCULAR; INTRAVENOUS EVERY 30 MIN PRN
Status: DISCONTINUED | OUTPATIENT
Start: 2025-07-02 | End: 2025-07-02 | Stop reason: HOSPADM

## 2025-07-02 RX ORDER — NALOXONE HYDROCHLORIDE 0.4 MG/ML
0.1 INJECTION, SOLUTION INTRAMUSCULAR; INTRAVENOUS; SUBCUTANEOUS
Status: DISCONTINUED | OUTPATIENT
Start: 2025-07-02 | End: 2025-07-02 | Stop reason: HOSPADM

## 2025-07-02 RX ORDER — PROPOFOL 10 MG/ML
INJECTION, EMULSION INTRAVENOUS PRN
Status: DISCONTINUED | OUTPATIENT
Start: 2025-07-02 | End: 2025-07-02

## 2025-07-02 RX ADMIN — PROPOFOL 100 MCG/KG/MIN: 10 INJECTION, EMULSION INTRAVENOUS at 10:07

## 2025-07-02 RX ADMIN — PROPOFOL 100 MG: 10 INJECTION, EMULSION INTRAVENOUS at 10:05

## 2025-07-02 RX ADMIN — LIDOCAINE HYDROCHLORIDE 100 MG: 20 INJECTION, SOLUTION INFILTRATION; PERINEURAL at 10:05

## 2025-07-02 RX ADMIN — SODIUM CHLORIDE, SODIUM LACTATE, POTASSIUM CHLORIDE, AND CALCIUM CHLORIDE: .6; .31; .03; .02 INJECTION, SOLUTION INTRAVENOUS at 10:00

## 2025-07-02 ASSESSMENT — ACTIVITIES OF DAILY LIVING (ADL)
ADLS_ACUITY_SCORE: 49
ADLS_ACUITY_SCORE: 50
ADLS_ACUITY_SCORE: 50

## 2025-07-02 NOTE — ANESTHESIA POSTPROCEDURE EVALUATION
Patient: Rosa Alcocer    Procedure: Procedure(s):  COLONOSCOPY, WITH UPPER GASTROINTESTINAL TRACT ENDOSCOPY WITH BIOPSY AND POLYPECTOMY       Anesthesia Type:  MAC    Note:  Disposition: Outpatient   Postop Pain Control: Uneventful            Sign Out: Well controlled pain   PONV: No   Neuro/Psych: Uneventful            Sign Out: Acceptable/Baseline neuro status   Airway/Respiratory: Uneventful            Sign Out: Acceptable/Baseline resp. status   CV/Hemodynamics: Uneventful            Sign Out: Acceptable CV status; No obvious hypovolemia; No obvious fluid overload   Other NRE: NONE   DID A NON-ROUTINE EVENT OCCUR?        Last vitals:  Vitals Value Taken Time   /85 07/02/25 11:15   Temp 97.5  F (36.4  C) 07/02/25 10:39   Pulse 65 07/02/25 11:15   Resp 16 07/02/25 10:39   SpO2 97 % 07/02/25 11:22   Vitals shown include unfiled device data.    Electronically Signed By: JOSUE Snider CRNA  July 2, 2025  12:02 PM

## 2025-07-02 NOTE — DISCHARGE INSTRUCTIONS
Thank you for allowing [facility] to care for you today.  If you have any questions and/or concerns please reach out to us Monday-Friday 0800-4:00 PM at [phone].  After hours please go to the Urgent Care and/or Emergency Room.  If you feel like it is an emergency call 911.    After Anesthesia (Sleep Medicine)  What should I do after anesthesia?  You should rest and relax for the next 24 hours. Avoid risky or difficult (strenuous) activity. A responsible adult should stay with you overnight.  Don't drive or use any heavy equipment for 24 hours. Even if you feel normal, your reactions may be affected by the sleep medicine given to you.  Don't drink alcohol or make any important decisions for 24 hours.  Slowly get back to your regular diet, as you feel able.  How should I expect to feel?  It's normal to feel dizzy, light-headed, or faint for up to a full day after anesthesia or while taking pain medicine. If this happens:   Sit down for a few minutes before standing.  Have someone help you when you get up to walk or use the bathroom.  If you have nausea (feel sick to your stomach) or vomit (throw up):   Drink clear liquids (such as apple juice, ginger ale, broth, or 7UP) until you feel better.  If you feel sick to your stomach, or you keep vomiting for 24 hours, please call the doctor.  What else should I know?  You might have a dry mouth, sore throat, muscle aches, or trouble sleeping. These should go away after 24 hours.  Please contact your doctor if you have any other symptoms that concern you, such as fever, pain, bleeding, fluid drainage, swelling, or headache, or if it's been over 8 to 10 hours and you still aren't able to pee (urinate).  If you have a history of sleep apnea, it's very important to use your CPAP machine for the next 24 hours when you nap or sleep.   For informational purposes only. Not to replace the advice of your health care provider. Copyright   2023 [facility]. All  rights reserved. Clinically reviewed by Mynor Trejo MD. [facility] [id number] - REV 09/23.    Colonoscopy: What to Expect at Home  Your Recovery  After a colonoscopy, you'll stay at the clinic until you wake up. Then you can go home. But you'll need to arrange for a ride. Your doctor will tell you when you can eat and do your other usual activities.  Your doctor will talk to you about when you'll need your next colonoscopy. Your doctor can help you decide how often you need to be checked. This will depend on the results of your test and your risk for colorectal cancer.  After the test, you may be bloated or have gas pains. You may need to pass gas. If a biopsy was done or a polyp was removed, you may have streaks of blood in your stool (feces) for a few days. Problems such as heavy rectal bleeding may not occur until several weeks after the test. This isn't common. But it can happen after polyps are removed.  This care sheet gives you a general idea about how long it will take for you to recover. But each person recovers at a different pace. Follow the steps below to get better as quickly as possible.  How can you care for yourself at home?  Activity    Rest when you feel tired.     You can do your normal activities when it feels okay to do so.   Diet    Follow your doctor's directions for eating.     Unless your doctor has told you not to, drink plenty of fluids. This helps to replace the fluids that were lost during the colon prep.     Do not drink alcohol.   Medicines    Your doctor will tell you if and when you can restart your medicines. You will also be given instructions about taking any new medicines.     If you stopped taking aspirin or some other blood thinner, your doctor will tell you when to start taking it again.     If polyps were removed or a biopsy was done during the test, your doctor may tell you not to take aspirin or other anti-inflammatory medicines for a few days. These include ibuprofen  "(Advil, Motrin) and naproxen (Aleve).   Other instructions    For your safety, do not drive or operate machinery until the medicine wears off and you can think clearly. Your doctor may tell you not to drive or operate machinery until the day after your test.     Do not sign legal documents or make major decisions until the medicine wears off and you can think clearly. The anesthesia can make it hard for you to fully understand what you are agreeing to.   Follow-up care is a key part of your treatment and safety. Be sure to make and go to all appointments, and call your doctor if you are having problems. It's also a good idea to know your test results and keep a list of the medicines you take.  When should you call for help?   Call 911 anytime you think you may need emergency care. For example, call if:    You passed out (lost consciousness).     You pass maroon or bloody stools.     You have trouble breathing.   Call your doctor now or seek immediate medical care if:    You have pain that does not get better after you take pain medicine.     You are sick to your stomach or cannot drink fluids.     You have new or worse belly pain.     You have blood in your stools.     You have a fever.     You cannot pass stools or gas.   Watch closely for changes in your health, and be sure to contact your doctor if you have any problems.  Where can you learn more?  Go to https://www.[employer].net/patiented  Enter E264 in the search box to learn more about \"Colonoscopy: What to Expect at Home.\"  Current as of: October 25, 2024  Content Version: 14.5    9054-9088 [employer].   Care instructions adapted under license by your healthcare professional. If you have questions about a medical condition or this instruction, always ask your healthcare professional. [employer] disclaims any warranty or liability for your use of this information.  Upper Gastrointestinal (GI) Endoscopy: What to Expect at Home  Your " Recovery  You had an upper GI endoscopy. Your doctor used a thin, lighted tube that bends to look at the inside of your esophagus, your stomach, and the first part of the small intestine, called the duodenum.  After you have an endoscopy, you will stay at the hospital or clinic for 1 to 2 hours. This will allow the medicine to wear off. You will be able to go home after your doctor or nurse checks to make sure that you're not having any problems.  You may have to stay overnight if you had treatment during the endoscopy. You may have a sore throat for a day or two after the procedure.  This care sheet gives you a general idea about how your recovery will begin in the hospital. Each person has a different experience and recovers at a different pace.  How can you care for yourself at home?  Activity  Rest as much as you need to after you go home.  You should be able to go back to your usual activities the day after the procedure.  Diet  Follow your doctor's directions for eating after the procedure.  Drink plenty of fluids (unless your doctor has told you not to).  Medicines  If you have a sore throat the next day, use an over-the-counter spray to numb your throat.  Follow-up care is a key part of your treatment and safety. Be sure to make and go to all appointments, and call your doctor if you are having problems. It's also a good idea to know your test results and keep a list of the medicines you take.  When should you call for help?  Call 911 anytime you think you may need emergency care. For example, call if:  You passed out (lost consciousness).  You have trouble breathing.  You pass maroon or bloody stools.  Call your doctor now or seek immediate medical care if:  You have pain that does not get better after your take pain medicine.  You have new or worse belly pain.  You have blood in your stools.  You are sick to your stomach and cannot keep fluids down.  You have a fever.  You cannot pass stools or gas.  Watch  "closely for changes in your health, and be sure to contact your doctor if:  Your throat still hurts after a day or two.  Where can you learn more?  Go to https://www.[employer].net/patiented  Enter J454 in the search box to learn more about \"Upper Gastrointestinal (GI) Endoscopy: What to Expect at Home.\"  Current as of: October 19, 2024  Content Version: 14.5    [id number] [employer].   Care instructions adapted under license by your healthcare professional. If you have questions about a medical condition or this instruction, always ask your healthcare professional. [employer] disclaims any warranty or liability for your use of this information.      "

## 2025-07-02 NOTE — OP NOTE
REPORT OF OPERATION  DATE OF PROCEDURE: 7/2/2025    PATIENT: Rosa Alccoer    SURGERY PERFORMED: Esophagogastroduodenoscopy with biopsies     PREOPERATIVE DIAGNOSIS: Anemia    POSTOPERATIVE DIAGNOSIS:    Hiatal Hernia and Esophageal mass at 35 cm   Squamous columnar junction at 35 cm    SURGEON: Charbel Rogers MD    ASSISTANTS: None    ANESTHESIA: Monitored Anesthesia Care    COMPLICATIONS: None apparent    ESTIMATED BLOOD LOSS: * No values recorded between 7/2/2025 10:07 AM and 7/2/2025 10:35 AM *    TRANSFUSIONS: None    TISSUE TO PATHOLOGY: Duodenal, Antral, Distal Esophageal, and Stomach polyp    FINDINGS: Hiatal Hernia and Esophageal mass at 35 cm    INDICATIONS: This is a 65 year old female in need of an Esophagogastroduodenoscopy for Anemia.  The patient will be taken to the endoscopy suite for that procedure.    DESCRIPTIONS OF PROCEDURE IN DETAIL: After consent was obtained the patient was taken to the operative suite and josephine in the left lateral decubitus position.  The patient was identified and the correct patient was confirmed. Monitored Anesthesia Care was given by anesthesia. A time out was performed verifying the correct patient and the correct procedure.  The entire operative team was in agreement.  All necessary equipment and supplies were in the room.    The endoscope was inserted into the mouth and passed without difficulty to the third portion of the duodenum.  Duodenal biopsies were taken.  The endoscope was then withdrawn through the duodenum, the duodenal bulb and pyloric channel and no abnormalities were noted.  The endoscope was brought back into the stomach and antral biopsies were obtained.  The endoscope was then retroflexed and no lesions of the fundus body or antrum were seen.  The endoscope was straightened back out and brought into the distal esophagus and a well-defined squamocolumnar junction was identified at 35 cm. There was an irregular masslike area of tissue at the GE  junction.  The mucosa at this mass appeared normal, however, biopsies of this were taken.  The endoscope was slowly withdrawn through the remaining esophagus no other abnormalities are seen,  The endoscope was withdrawn from the patient the patient was then taken to the recovery room in stable condition tolerated the procedure well.

## 2025-07-02 NOTE — ANESTHESIA CARE TRANSFER NOTE
Patient: Rosa Alcocer    Procedure: Procedure(s):  COLONOSCOPY, WITH UPPER GASTROINTESTINAL TRACT ENDOSCOPY WITH BIOPSY AND POLYPECTOMY       Diagnosis: Anemia [D64.9]  Diagnosis Additional Information: No value filed.    Anesthesia Type:   MAC     Note:    Oropharynx: oropharynx clear of all foreign objects and spontaneously breathing  Level of Consciousness: drowsy  Oxygen Supplementation: nasal cannula  Level of Supplemental Oxygen (L/min / FiO2): 2  Independent Airway: airway patency satisfactory and stable  Dentition: dentition unchanged  Vital Signs Stable: post-procedure vital signs reviewed and stable  Report to RN Given: handoff report given  Patient transferred to: Phase II    Handoff Report: Identifed the Patient, Identified the Reponsible Provider, Reviewed the pertinent medical history, Discussed the surgical course, Reviewed Intra-OP anesthesia mangement and issues during anesthesia, Set expectations for post-procedure period and Allowed opportunity for questions and acknowledgement of understanding  Vitals:  Vitals Value Taken Time   BP     Temp     Pulse     Resp     SpO2 98 % 07/02/25 10:41   Vitals shown include unfiled device data.    Electronically Signed By: JOSUE Garza CRNA  July 2, 2025  10:43 AM

## 2025-07-02 NOTE — OP NOTE
REPORT OF OPERATION  DATE OF PROCEDURE: 7/2/2025    PATIENT: Rosa Alcocer    SURGERY PERFORMED: Colonoscopy    PREOPERATIVE DIAGNOSIS: Screening colonoscopy    POSTOPERATIVE DIAGNOSIS:    Same   Normal colonoscopy   Diverticulosis was not identified.   Hemorrhoids were not  identified.    SURGEON: Charbel Rogers MD    ASSISTANTS: None    ANESTHESIA: Monitored Anesthesia Care    COMPLICATIONS: None apparent    ESTIMATED BLOOD LOSS: * No values recorded between 7/2/2025 10:07 AM and 7/2/2025 10:35 AM *    TRANSFUSIONS: None    TISSUE TO PATHOLOGY: None    FINDINGS: Normal colonoscopy.  Diverticulosis was not identified.  Hemorrhoids were not  identified.    INDICATIONS: This is a 65 year old female in need of a colonoscopy for Anemia.  The patient will be taken to the endoscopy suite for that procedure.    DESCRIPTIONS OF PROCEDURE IN DETAIL: After consent was obtained the patient was taken to the endoscopy suite and placed in the left lateral decubitus position.  The patient was identified and the correct patient was confirmed.  Monitored Anesthesia Care was given.  A time out was performed verifying the correct patient and the correct procedure.  The entire operative team was in agreement.  All necessary equipment and supplies were in the room.    Rectal exam was performed and no lesions of the anal canal were noted.  The colonoscope was inserted into the anus and passed without difficulty to the cecum.  The cecum was identified by the ileocecal valve, the coalescence of the tinea and the appendiceal orifice.  Upon withdrawal all walls of the colon were visualized.  There were no polyps, masses or evidence of colitis seen.  There was a tattooing seen in the sigmoid colon.  This area was inspected meticulously and there were no polyps or mucosal changes.  Diverticulosis was not seen.  Upon reaching the rectum the scope was retroflexed and internal hemorrhoids were not  seen.  The scope was straightened back  out and removed from the patient.  The patient was then taken to the recovery room in stable condition tolerating the procedure well.      Prep: excellent    Withdrawal time was 10 minutes.    It is recommended that the patient have another colonoscopy in 10 years.

## 2025-07-02 NOTE — H&P
"Colonoscopy & EGD H&P  7/2/2025    Patient:Rosa Alcocer  Referring Physician: Dr. Singh    Reason for Referral: Colonoscopy    HPI:  Rosa Alcocer is a very pleasant 65 year old female presenting for EGD and colonoscopy for anemia found on preoperative workup for orthopedic surgery.  Patient had normal colonoscopy in 2022.  Surgical history includes bilateral salpingectomy.  CT scan was performed as part of her workup which demonstrated a hiatal hernia.  Patient denies any changes to her health since last seen by me 6/13/25.  Patient has history of asthma and \"covid lungs.\"  She endorses shortness of breath with activity but denies chest pain.  Does not require home oxygen.    Past Medical History:  Past Medical History:   Diagnosis Date    Asthma     Benign essential hypertension     Colon cancer (H) 2008    Osteopenia     2023 frax 7.5/0.6%    Post concussive syndrome     Thyroid nodule     4; repeat annual ultrsound       Past Surgical History:  Past Surgical History:   Procedure Laterality Date    ARTHROPLASTY HIP ANTERIOR Right 04/29/2024    Procedure: Right Direct Anterior Total Hip Arthroplasty;  Surgeon: Kirill Jose MD;  Location: [facility]    COLONOSCOPY - HIM SCAN  02/24/2022    repeat 5 years; [facility]    DILATION AND CURETTAGE, OPERATIVE HYSTEROSCOPY, COMBINED N/A 12/13/2023    Procedure: HYSTEROSCOPY, WITH DILATION AND CURETTAGE OF UTERUS;  Surgeon: Bakari Lott MD;  Location: [facility]    IR PICC PLACEMENT > 5 YRS OF AGE  01/05/2022    SALPINGO-OOPHORECTOMY BILATERAL Bilateral 12/13/2023    Procedure: LAPAROSCOPIC SALPINGO-OOPHORECTOMY, BILATERAL and peritoneal biopsies;  Surgeon: Bakari Lott MD;  Location: [facility]       Family History History:  Family History   Problem Relation Age of Onset    Alzheimer Disease Mother     Alcoholism Mother     Myocardial Infarction Mother     Thrombosis Mother     Cerebrovascular Disease Father     Hearing Loss Father     Myocardial Infarction Father     " "Hypertension Sister     Diabetes Sister     Valvular heart disease Sister     Aortic Valve Replacement Sister     Hypertension Sister     Breast Cancer Sister 63    Macular Degeneration Sister     Hypertension Sister     Osteopenia Sister     Valvular heart disease Brother     Cerebrovascular Disease Brother     Cancer Brother     Hypertension Brother     Skin Cancer Brother     Osteopenia Brother        History of Tobacco Use:  History   Smoking Status    Former    Types: Cigarettes   Smokeless Tobacco    Never       Current Medications:  No current outpatient medications on file.       Allergies:  Allergies   Allergen Reactions    Codeine Shortness Of Breath and Swelling    Fentanyl Other (See Comments)     Bradycardia & Hypotension    Meloxicam Other (See Comments)     Hypersensitive, SOB, insomnia, HTN    No Clinical Screening - See Comments Other (See Comments) and Shortness Of Breath     MOLD, GRASSES. Some cleaning products, perfumes, tar,  Skunk scent    Prochlorperazine Hives, Shortness Of Breath and Swelling     COMPAZINE      Shellfish Allergy Hives, Shortness Of Breath and Swelling    Amlodipine Other (See Comments)     Hip pain and edema.    Cyclobenzaprine Other (See Comments)     Jittery, leg cramps, insomnia,     Losartan Cough, Itching and Muscle Pain (Myalgia)    Fluticasone Swelling    Oxycodone Headache and Nausea    Budesonide-Formoterol Fumarate Other (See Comments) and Palpitations     Leg pains       ROS:  Pertinent items are noted in HPI.    PHYSICAL EXAM:     Vital signs: /74   Pulse 89   Temp 98.4  F (36.9  C) (Tympanic)   Resp 18   Ht 1.651 m (5' 5\")   Wt 93.2 kg (205 lb 6.4 oz)   SpO2 95%   BMI 34.18 kg/m     Weight: [unfilled]   BMI: Body mass index is 34.18 kg/m .   General: No apparent distress   Skin: No rashes or readily discernible lesions   Neck: Neck supple, symmetric and without palpable adenopathy or masses   Lungs: Nonlabored breathing on room air.  Clear to " auscultation bilaterally.   CV: Regular rate and rhythm on auscultation   Abdominal: Soft, nontender, nondistended   Extremities: No peripheral edema    Labs:  Reviewed.  Hgb stable in the 7 range.    Imaging:  N/a    ASSESSMENT:  Rosa Alcocer is a very pleasant 65 year old female presenting for EGD and colonoscopy    Discussed with patient risks and benefits of EGD and  colonoscopy including bleeding, perforation, and missed lesion.  Patient demonstrated understanding and wishes to proceed with endoscopy.    PLAN:  Proceed with colonoscopy

## 2025-07-02 NOTE — PROGRESS NOTES
Patient and responsible adult given discharge instructions with no questions regarding instructions. Delilah score 20. Pain level 0/10.  Discharged from unit via wheelchair. Patient discharged to home, in stable condition, with helper.

## 2025-07-10 ENCOUNTER — TELEPHONE (OUTPATIENT)
Dept: SURGERY | Facility: OTHER | Age: 65
End: 2025-07-10

## 2025-07-10 DIAGNOSIS — K31.7 HYPERPLASTIC POLYP OF STOMACH: Primary | ICD-10-CM

## 2025-07-15 RX ORDER — OMEPRAZOLE 20 MG/1
20 CAPSULE, DELAYED RELEASE ORAL DAILY
Qty: 90 CAPSULE | Refills: 1 | Status: SHIPPED | OUTPATIENT
Start: 2025-07-15

## 2025-07-21 ENCOUNTER — OFFICE VISIT (OUTPATIENT)
Dept: SURGERY | Facility: OTHER | Age: 65
End: 2025-07-21
Attending: SURGERY
Payer: COMMERCIAL

## 2025-07-21 VITALS
TEMPERATURE: 97.3 F | OXYGEN SATURATION: 99 % | HEART RATE: 105 BPM | DIASTOLIC BLOOD PRESSURE: 64 MMHG | SYSTOLIC BLOOD PRESSURE: 118 MMHG

## 2025-07-21 DIAGNOSIS — K31.7 HYPERPLASTIC POLYP OF STOMACH: Primary | ICD-10-CM

## 2025-07-21 PROCEDURE — G0463 HOSPITAL OUTPT CLINIC VISIT: HCPCS | Mod: 25

## 2025-07-21 ASSESSMENT — PAIN SCALES - GENERAL: PAINLEVEL_OUTOF10: MODERATE PAIN (4)

## 2025-07-21 NOTE — PROGRESS NOTES
A&P:  65 year old female presenting for follow up of endoscopy for anemia.  Colonoscopy was unrevealing.  EGD demosntrated Mercedes's esophagus and a gastric hyperplastic polyp.  Discussed recommendations for gastric hyperplastic polyp with patient including GI referral where a more thorough review of hyperplastic polyps, testing for H pylori and recommendations going forward could be made.  Patient demonstrated understanding in this regard.  Patient has not yet been contacted from [facility].  Second referral sent in.  Patient instructed to call our clinic if she does not receive a call from [facility].  Will get her in for EGD in 5 years for short segment Mercedes's esophagus without dysplasia.    S:  Feels well.  Remains tired.  Seeing PCP this week in this regard.    O:  Appears well.    Exam deferred.

## 2025-07-22 NOTE — PROGRESS NOTES
"  Assessment & Plan     Anemia, unspecified type  Unclear cause.  EGD/colonoscopy as above.  Gastric polyp noted.  Awaiting GI consult.  See patient instructions.  Referral refaxed today.  Continue prilosec.  Add OTC iron daily.  Order for iron infusion therapy plan placed.  Signed.  Referral to hematology - if not bleeding -cause of iron deficiency?  - CBC with platelets and differential; Future  - Lab Blood Morphology Pathologist Review; Future  - Ferritin; Future  - Iron and iron binding capacity; Future  - Reticulocyte count; Future  - Lab Blood Morphology Pathologist Review  - Ferritin  - Iron and iron binding capacity  - Adult Oncology/Hematology  Referral; Future    BMI  Estimated body mass index is 33.96 kg/m  as calculated from the following:    Height as of 7/2/25: 1.651 m (5' 5\").    Weight as of this encounter: 92.6 kg (204 lb 1.6 oz).         Margaret Lee is a 65 year old, presenting for the following health issues:  Hypertension, Anemia, and Asthma        7/23/2025     1:12 PM   Additional Questions   Roomed by Ana GARCIA     History of Present Illness       Reason for visit:  Blood    She eats 2-3 servings of fruits and vegetables daily.She consumes 1 sweetened beverage(s) daily.She exercises with enough effort to increase her heart rate 30 to 60 minutes per day.  She exercises with enough effort to increase her heart rate 4 days per week.   She is taking medications regularly.        Hypertension Follow-up  Do you check your blood pressure regularly outside of the clinic? No   Are you following a low salt diet? No  Are your blood pressures ever more than 140 on the top number (systolic) OR more than 90 on the bottom number (diastolic), for example 140/90? N/A    BP Readings from Last 2 Encounters:   07/23/25 124/60   07/21/25 118/64     Asthma      7/23/2025    12:57 PM   ACT Total Scores   ACT TOTAL SCORE (Goal Greater than or Equal to 20) 18    In the past 12 months, how many times " did you visit the emergency room for your asthma without being admitted to the hospital? 0   In the past 12 months, how many times were you hospitalized overnight because of your asthma? 0       Patient-reported   Do you have any of the following symptoms? Cough  What makes your asthma/breathing worse?  Smoke, Upper respiratory illness, and Exercise or sports  Do you want more information about how to use your inhaler? No    Concern - Anemia   Description: 3 week follow up anemia, 7.8 hgb on 7/2/25  Accompanying Signs & Symptoms: tired all the time  Therapies tried and outcome: eating more iron    Awaiting GI referral - [facility] - not yet contacted; check on status; 2 referrals - 7/10 and 7/21  Prilosec 20 mg daily started by surgeon.    Hiatal hernia  Esophageal mass at 35 cm - hyperplastic polyp - gastric - GI referral.  Lower endoscopy negative  PPI for Barretts'; repeat 5 years    No abnormal bleeding.  No aspirin or ibuprofen.  Feels tired.  Still pain in left abdomen.   CT 6/27/25 - hiatal hernia - large    Review of Systems  Constitutional, HEENT, cardiovascular, pulmonary, gi and gu systems are negative, except as otherwise noted.      Objective    /60 (BP Location: Left arm, Patient Position: Sitting, Cuff Size: Adult Large)   Pulse 89   Temp 98.5  F (36.9  C) (Tympanic)   Wt 92.6 kg (204 lb 1.6 oz)   SpO2 97%   BMI 33.96 kg/m    Body mass index is 33.96 kg/m .  Physical Exam   GENERAL: alert and no distress  NECK: no adenopathy, no asymmetry, masses, or scars  RESP: lungs clear to auscultation - no rales, rhonchi or wheezes  CV: regular rate and rhythm, normal S1 S2, no S3 or S4, no murmur, click or rub, no peripheral edema  ABDOMEN: soft, nontender, no hepatosplenomegaly, no masses and bowel sounds normal  MS: no gross musculoskeletal defects noted, no edema  PSYCH: mentation appears normal, affect normal/bright    Recent Results (from the past 24 hours)   Lab Blood Morphology Pathologist  Review    Narrative    The following orders were created for panel order Lab Blood Morphology Pathologist Review.  Procedure                               Abnormality         Status                     ---------                               -----------         ------                     Bld morphology patholog...[[id number]]                      In process                 CBC with platelets and ...[[id number]]  Abnormal            Final result               RBC and Platelet Morpho...[[id number]]                      Final result               Reticulocyte count[[id number]]          Normal              Final result               Morphology Tracking[[id number]]                             Final result                 Please view results for these tests on the individual orders.   Ferritin   Result Value Ref Range    Ferritin 5 (L) 11 - 328 ng/mL   Iron and iron binding capacity   Result Value Ref Range    Iron 15 (L) 37 - 145 ug/dL    Iron Binding Capacity 443 (H) 240 - 430 ug/dL    Iron Sat Index 3 (L) 15 - 46 %   CBC with platelets and differential   Result Value Ref Range    WBC Count 6.7 4.0 - 11.0 10e3/uL    RBC Count 4.42 3.80 - 5.20 10e6/uL    Hemoglobin 7.8 (L) 11.7 - 15.7 g/dL    Hematocrit 28.0 (L) 35.0 - 47.0 %    MCV 63 (L) 78 - 100 fL    MCH 17.6 (L) 26.5 - 33.0 pg    MCHC 27.9 (L) 31.5 - 36.5 g/dL    RDW 19.2 (H) 10.0 - 15.0 %    Platelet Count 424 150 - 450 10e3/uL    % Neutrophils 71 %    % Lymphocytes 17 %    % Monocytes 10 %    % Eosinophils 2 %    % Basophils 1 %    % Immature Granulocytes 0 %    NRBCs per 100 WBC 0 <1 /100    Absolute Neutrophils 4.8 1.6 - 8.3 10e3/uL    Absolute Lymphocytes 1.1 0.8 - 5.3 10e3/uL    Absolute Monocytes 0.7 0.0 - 1.3 10e3/uL    Absolute Eosinophils 0.1 0.0 - 0.7 10e3/uL    Absolute Basophils 0.0 0.0 - 0.2 10e3/uL    Absolute Immature Granulocytes 0.0 <=0.4 10e3/uL    Absolute NRBCs 0.0 10e3/uL   Reticulocyte count   Result Value Ref Range    % Reticulocyte 2.0 0.5 -  2.0 %    Absolute Reticulocyte 0.086 0.025 - 0.095 10e6/uL   RBC and Platelet Morphology   Result Value Ref Range    RBC Morphology Confirmed RBC Indices     Platelet Assessment  Automated Count Confirmed. Platelet morphology is normal.     Automated Count Confirmed. Platelet morphology is normal.   The longitudinal plan of care for the diagnosis(es)/condition(s) as documented were addressed during this visit. Due to the added complexity in care, I will continue to support Rosa in the subsequent management and with ongoing continuity of care.        Signed Electronically by: Salma Rios MD

## 2025-07-23 ENCOUNTER — OFFICE VISIT (OUTPATIENT)
Dept: FAMILY MEDICINE | Facility: OTHER | Age: 65
End: 2025-07-23
Attending: FAMILY MEDICINE
Payer: COMMERCIAL

## 2025-07-23 VITALS
WEIGHT: 204.1 LBS | HEART RATE: 89 BPM | SYSTOLIC BLOOD PRESSURE: 124 MMHG | OXYGEN SATURATION: 97 % | DIASTOLIC BLOOD PRESSURE: 60 MMHG | TEMPERATURE: 98.5 F | BODY MASS INDEX: 33.96 KG/M2

## 2025-07-23 DIAGNOSIS — D64.9 ANEMIA, UNSPECIFIED TYPE: Primary | ICD-10-CM

## 2025-07-23 LAB
BASOPHILS # BLD AUTO: 0 10E3/UL (ref 0–0.2)
BASOPHILS NFR BLD AUTO: 1 %
EOSINOPHIL # BLD AUTO: 0.1 10E3/UL (ref 0–0.7)
EOSINOPHIL NFR BLD AUTO: 2 %
ERYTHROCYTE [DISTWIDTH] IN BLOOD BY AUTOMATED COUNT: 19.2 % (ref 10–15)
FERRITIN SERPL-MCNC: 5 NG/ML (ref 11–328)
HCT VFR BLD AUTO: 28 % (ref 35–47)
HGB BLD-MCNC: 7.8 G/DL (ref 11.7–15.7)
IMM GRANULOCYTES # BLD: 0 10E3/UL
IMM GRANULOCYTES NFR BLD: 0 %
IRON BINDING CAPACITY (ROCHE): 443 UG/DL (ref 240–430)
IRON SATN MFR SERPL: 3 % (ref 15–46)
IRON SERPL-MCNC: 15 UG/DL (ref 37–145)
LYMPHOCYTES # BLD AUTO: 1.1 10E3/UL (ref 0.8–5.3)
LYMPHOCYTES NFR BLD AUTO: 17 %
MCH RBC QN AUTO: 17.6 PG (ref 26.5–33)
MCHC RBC AUTO-ENTMCNC: 27.9 G/DL (ref 31.5–36.5)
MCV RBC AUTO: 63 FL (ref 78–100)
MONOCYTES # BLD AUTO: 0.7 10E3/UL (ref 0–1.3)
MONOCYTES NFR BLD AUTO: 10 %
NEUTROPHILS # BLD AUTO: 4.8 10E3/UL (ref 1.6–8.3)
NEUTROPHILS NFR BLD AUTO: 71 %
NRBC # BLD AUTO: 0 10E3/UL
NRBC BLD AUTO-RTO: 0 /100
PLAT MORPH BLD: NORMAL
PLATELET # BLD AUTO: 424 10E3/UL (ref 150–450)
RBC # BLD AUTO: 4.42 10E6/UL (ref 3.8–5.2)
RBC MORPH BLD: NORMAL
RETICS # AUTO: 0.09 10E6/UL (ref 0.03–0.1)
RETICS/RBC NFR AUTO: 2 % (ref 0.5–2)
WBC # BLD AUTO: 6.7 10E3/UL (ref 4–11)

## 2025-07-23 PROCEDURE — 82728 ASSAY OF FERRITIN: CPT | Mod: ZL | Performed by: FAMILY MEDICINE

## 2025-07-23 PROCEDURE — 85025 COMPLETE CBC W/AUTO DIFF WBC: CPT | Mod: ZL | Performed by: FAMILY MEDICINE

## 2025-07-23 PROCEDURE — 85060 BLOOD SMEAR INTERPRETATION: CPT | Performed by: PATHOLOGY

## 2025-07-23 PROCEDURE — G0463 HOSPITAL OUTPT CLINIC VISIT: HCPCS

## 2025-07-23 PROCEDURE — 83550 IRON BINDING TEST: CPT | Mod: ZL | Performed by: FAMILY MEDICINE

## 2025-07-23 PROCEDURE — 36415 COLL VENOUS BLD VENIPUNCTURE: CPT | Mod: ZL | Performed by: FAMILY MEDICINE

## 2025-07-23 PROCEDURE — 85045 AUTOMATED RETICULOCYTE COUNT: CPT | Mod: ZL | Performed by: FAMILY MEDICINE

## 2025-07-23 RX ORDER — HEPARIN SODIUM,PORCINE 10 UNIT/ML
5-20 VIAL (ML) INTRAVENOUS DAILY PRN
OUTPATIENT
Start: 2025-07-30

## 2025-07-23 RX ORDER — DIPHENHYDRAMINE HYDROCHLORIDE 50 MG/ML
50 INJECTION, SOLUTION INTRAMUSCULAR; INTRAVENOUS
Start: 2025-07-30

## 2025-07-23 RX ORDER — ALBUTEROL SULFATE 0.83 MG/ML
2.5 SOLUTION RESPIRATORY (INHALATION)
OUTPATIENT
Start: 2025-07-30

## 2025-07-23 RX ORDER — EPINEPHRINE 1 MG/ML
0.3 INJECTION, SOLUTION INTRAMUSCULAR; SUBCUTANEOUS EVERY 5 MIN PRN
OUTPATIENT
Start: 2025-07-30

## 2025-07-23 RX ORDER — DIPHENHYDRAMINE HYDROCHLORIDE 50 MG/ML
25 INJECTION, SOLUTION INTRAMUSCULAR; INTRAVENOUS
Start: 2025-07-30

## 2025-07-23 RX ORDER — METHYLPREDNISOLONE SODIUM SUCCINATE 40 MG/ML
40 INJECTION INTRAMUSCULAR; INTRAVENOUS
Start: 2025-07-30

## 2025-07-23 RX ORDER — MEPERIDINE HYDROCHLORIDE 25 MG/ML
25 INJECTION INTRAMUSCULAR; INTRAVENOUS; SUBCUTANEOUS
OUTPATIENT
Start: 2025-07-30

## 2025-07-23 RX ORDER — HEPARIN SODIUM (PORCINE) LOCK FLUSH IV SOLN 100 UNIT/ML 100 UNIT/ML
5 SOLUTION INTRAVENOUS
OUTPATIENT
Start: 2025-07-30

## 2025-07-23 RX ORDER — ALBUTEROL SULFATE 90 UG/1
1-2 INHALANT RESPIRATORY (INHALATION)
Start: 2025-07-30

## 2025-07-23 ASSESSMENT — PAIN SCALES - GENERAL: PAINLEVEL_OUTOF10: MODERATE PAIN (6)

## 2025-07-23 ASSESSMENT — ASTHMA QUESTIONNAIRES
QUESTION_2 LAST FOUR WEEKS HOW OFTEN HAVE YOU HAD SHORTNESS OF BREATH: ONCE A DAY
QUESTION_5 LAST FOUR WEEKS HOW WOULD YOU RATE YOUR ASTHMA CONTROL: WELL CONTROLLED
QUESTION_3 LAST FOUR WEEKS HOW OFTEN DID YOUR ASTHMA SYMPTOMS (WHEEZING, COUGHING, SHORTNESS OF BREATH, CHEST TIGHTNESS OR PAIN) WAKE YOU UP AT NIGHT OR EARLIER THAN USUAL IN THE MORNING: NOT AT ALL
QUESTION_4 LAST FOUR WEEKS HOW OFTEN HAVE YOU USED YOUR RESCUE INHALER OR NEBULIZER MEDICATION (SUCH AS ALBUTEROL): ONE OR TWO TIMES PER DAY
QUESTION_1 LAST FOUR WEEKS HOW MUCH OF THE TIME DID YOUR ASTHMA KEEP YOU FROM GETTING AS MUCH DONE AT WORK, SCHOOL OR AT HOME: NONE OF THE TIME
ACT_TOTALSCORE: 18

## 2025-07-23 NOTE — LETTER
My Asthma Action Plan    Name: Rosa Alcocer   YOB: 1960  Date: 7/23/2025   My doctor: Salma Rios MD   [facility]        My Rescue Medicine: Albuterol (Proair/Ventolin/Proventil HFA) 2-4 puffs EVERY 4 HOURS as needed. Use a spacer if recommended by your provider.   My Asthma Severity:   Intermittent / Exercise Induced  Know your asthma triggers: exercise or sports  smoke  pollens          GREEN ZONE   Good Control  I feel good  No cough or wheeze  Can work, sleep and play without asthma symptoms       Take your asthma control medicine every day.     If exercise triggers your asthma, take your rescue medication  15 minutes before exercise or sports, and  During exercise if you have asthma symptoms  Spacer to use with inhaler: If you have a spacer, make sure to use it with your inhaler             YELLOW ZONE Getting Worse  I have ANY of these:  I do not feel good  Cough or wheeze  Chest feels tight  Wake up at night   Keep taking your Green Zone medications  Start taking your rescue medicine:  every 20 minutes for up to 1 hour. Then every 4 hours for 24-48 hours.  If you stay in the Yellow Zone for more than 12-24 hours, contact your doctor.  If you do not return to the Green Zone in 12-24 hours or you get worse, start taking your oral steroid medicine if prescribed by your provider.           RED ZONE Medical Alert - Get Help  I have ANY of these:  I feel awful  Medicine is not helping  Breathing getting harder  Trouble walking or talking  Nose opens wide to breathe       Take your rescue medicine NOW  If your provider has prescribed an oral steroid medicine, start taking it NOW  Call your doctor NOW  If you are still in the Red Zone after 20 minutes and you have not reached your doctor:  Take your rescue medicine again and  Call 911 or go to the emergency room right away    See your regular doctor within 2 weeks of an Emergency Room or Urgent Care visit  for follow-up treatment.          Annual Reminders:  Meet with Asthma Educator,  Flu Shot in the Fall, consider Pneumonia Vaccination for patients with asthma (aged 19 and older).    Pharmacy:    [id number] - [place] - [address] [facility] - [address]    Electronically signed by Salma Rios MD   Date: 07/23/25                    Asthma Triggers  How To Control Things That Make Your Asthma Worse    Triggers are things that make your asthma worse.  Look at the list below to help you find your triggers and   what you can do about them. You can help prevent asthma flare-ups by staying away from your triggers.      Trigger                                                          What you can do   Cigarette Smoke  Tobacco smoke can make asthma worse. Do not allow smoking in your home, car or around you.  Be sure no one smokes at a child s day care or school.  If you smoke, ask your health care provider for ways to help you quit.  Ask family members to quit too.  Ask your health care provider for a referral to Quit Plan to help you quit smoking, or call 0-998-710-PLAN.     Colds, Flu, Bronchitis  These are common triggers of asthma. Wash your hands often.  Don t touch your eyes, nose or mouth.  Get a flu shot every year.     Dust Mites  These are tiny bugs that live in cloth or carpet. They are too small to see. Wash sheets and blankets in hot water every week.   Encase pillows and mattress in dust mite proof covers.  Avoid having carpet if you can. If you have carpet, vacuum weekly.   Use a dust mask and HEPA vacuum.   Pollen and Outdoor Mold  Some people are allergic to trees, grass, or weed pollen, or molds. Try to keep your windows closed.  Limit time out doors when pollen count is high.   Ask you health care provider about taking medicine during allergy season.     Animal Dander  Some people are allergic to skin flakes, urine or saliva from pets  with fur or feathers. Keep pets with fur or feathers out of your home.    If you can t keep the pet outdoors, then keep the pet out of your bedroom.  Keep the bedroom door closed.  Keep pets off cloth furniture and away from stuffed toys.     Mice, Rats, and Cockroaches  Some people are allergic to the waste from these pests.   Cover food and garbage.  Clean up spills and food crumbs.  Store grease in the refrigerator.   Keep food out of the bedroom.   Indoor Mold  This can be a trigger if your home has high moisture. Fix leaking faucets, pipes, or other sources of water.   Clean moldy surfaces.  Dehumidify basement if it is damp and smelly.   Smoke, Strong Odors, and Sprays  These can reduce air quality. Stay away from strong odors and sprays, such as perfume, powder, hair spray, paints, smoke incense, paint, cleaning products, candles and new carpet.   Exercise or Sports  Some people with asthma have this trigger. Be active!  Ask your doctor about taking medicine before sports or exercise to prevent symptoms.    Warm up for 5-10 minutes before and after sports or exercise.     Other Triggers of Asthma  Cold air:  Cover your nose and mouth with a scarf.  Sometimes laughing or crying can be a trigger.  Some medicines and food can trigger asthma.

## 2025-07-23 NOTE — PATIENT INSTRUCTIONS
2nd GI referral placed 7/21 by Dr KING  Faxed by staff today to - [phone] and [phone]  Can call to check on statu - [phone]    Iron infusions ordered.  Can take daily over the counter iron supplement as well.  Hematology consult placed.  Continue Prilosec.    Results for orders placed or performed in visit on 07/23/25   Ferritin     Status: Abnormal   Result Value Ref Range    Ferritin 5 (L) 11 - 328 ng/mL   Iron and iron binding capacity     Status: Abnormal   Result Value Ref Range    Iron 15 (L) 37 - 145 ug/dL    Iron Binding Capacity 443 (H) 240 - 430 ug/dL    Iron Sat Index 3 (L) 15 - 46 %   CBC with platelets and differential     Status: Abnormal   Result Value Ref Range    WBC Count 6.7 4.0 - 11.0 10e3/uL    RBC Count 4.42 3.80 - 5.20 10e6/uL    Hemoglobin 7.8 (L) 11.7 - 15.7 g/dL    Hematocrit 28.0 (L) 35.0 - 47.0 %    MCV 63 (L) 78 - 100 fL    MCH 17.6 (L) 26.5 - 33.0 pg    MCHC 27.9 (L) 31.5 - 36.5 g/dL    RDW 19.2 (H) 10.0 - 15.0 %    Platelet Count 424 150 - 450 10e3/uL    % Neutrophils 71 %    % Lymphocytes 17 %    % Monocytes 10 %    % Eosinophils 2 %    % Basophils 1 %    % Immature Granulocytes 0 %    NRBCs per 100 WBC 0 <1 /100    Absolute Neutrophils 4.8 1.6 - 8.3 10e3/uL    Absolute Lymphocytes 1.1 0.8 - 5.3 10e3/uL    Absolute Monocytes 0.7 0.0 - 1.3 10e3/uL    Absolute Eosinophils 0.1 0.0 - 0.7 10e3/uL    Absolute Basophils 0.0 0.0 - 0.2 10e3/uL    Absolute Immature Granulocytes 0.0 <=0.4 10e3/uL    Absolute NRBCs 0.0 10e3/uL   Reticulocyte count     Status: Normal   Result Value Ref Range    % Reticulocyte 2.0 0.5 - 2.0 %    Absolute Reticulocyte 0.086 0.025 - 0.095 10e6/uL   RBC and Platelet Morphology     Status: None   Result Value Ref Range    RBC Morphology Confirmed RBC Indices     Platelet Assessment  Automated Count Confirmed. Platelet morphology is normal.     Automated Count Confirmed. Platelet morphology is normal.   Lab Blood Morphology Pathologist Review     Status:  Abnormal (In process)    Narrative    The following orders were created for panel order Lab Blood Morphology Pathologist Review.  Procedure                               Abnormality         Status                     ---------                               -----------         ------                     Bld morphology patholog...[[id number]]                      In process                 CBC with platelets and ...[[id number]]  Abnormal            Final result               RBC and Platelet Morpho...[[id number]]                      Final result               Reticulocyte count[[id number]]          Normal              Final result               Morphology Tracking[[id number]]                             Final result                 Please view results for these tests on the individual orders.

## 2025-07-27 ENCOUNTER — TELEPHONE (OUTPATIENT)
Dept: FAMILY MEDICINE | Facility: OTHER | Age: 65
End: 2025-07-27

## 2025-07-27 DIAGNOSIS — D64.9 ANEMIA, UNSPECIFIED TYPE: Primary | ICD-10-CM

## 2025-07-29 RX ORDER — HEPARIN SODIUM (PORCINE) LOCK FLUSH IV SOLN 100 UNIT/ML 100 UNIT/ML
5 SOLUTION INTRAVENOUS
OUTPATIENT
Start: 2025-08-03

## 2025-07-29 RX ORDER — ALBUTEROL SULFATE 90 UG/1
1-2 INHALANT RESPIRATORY (INHALATION)
Start: 2025-08-03

## 2025-07-29 RX ORDER — ALBUTEROL SULFATE 0.83 MG/ML
2.5 SOLUTION RESPIRATORY (INHALATION)
OUTPATIENT
Start: 2025-08-03

## 2025-07-29 RX ORDER — DIPHENHYDRAMINE HYDROCHLORIDE 50 MG/ML
50 INJECTION, SOLUTION INTRAMUSCULAR; INTRAVENOUS
Start: 2025-08-03

## 2025-07-29 RX ORDER — EPINEPHRINE 1 MG/ML
0.3 INJECTION, SOLUTION INTRAMUSCULAR; SUBCUTANEOUS EVERY 5 MIN PRN
OUTPATIENT
Start: 2025-08-03

## 2025-07-29 RX ORDER — DIPHENHYDRAMINE HYDROCHLORIDE 50 MG/ML
25 INJECTION, SOLUTION INTRAMUSCULAR; INTRAVENOUS
Start: 2025-08-03

## 2025-07-29 RX ORDER — HEPARIN SODIUM,PORCINE 10 UNIT/ML
5-20 VIAL (ML) INTRAVENOUS DAILY PRN
OUTPATIENT
Start: 2025-08-03

## 2025-07-29 RX ORDER — MEPERIDINE HYDROCHLORIDE 25 MG/ML
25 INJECTION INTRAMUSCULAR; INTRAVENOUS; SUBCUTANEOUS
OUTPATIENT
Start: 2025-08-03

## 2025-07-29 RX ORDER — METHYLPREDNISOLONE SODIUM SUCCINATE 40 MG/ML
40 INJECTION INTRAMUSCULAR; INTRAVENOUS
Start: 2025-08-03

## 2025-07-29 NOTE — TELEPHONE ENCOUNTER
Therapy plan for Infed signed.  However, please contact pharmacy to verify that I have ordered correctly - correct dosing?  I asksed for MTM help, and am waiting on response.

## 2025-07-30 NOTE — TELEPHONE ENCOUNTER
Spoke with infusion. If you decide to change the Infed from 500 to 1000 , a new order would need to be sent. Also Infusion is asking if the Injectafer can be canceled. Something to do with insurance.

## 2025-08-05 ENCOUNTER — TELEPHONE (OUTPATIENT)
Dept: SURGERY | Facility: OTHER | Age: 65
End: 2025-08-05

## 2025-08-12 ENCOUNTER — INFUSION THERAPY VISIT (OUTPATIENT)
Dept: INFUSION THERAPY | Facility: OTHER | Age: 65
End: 2025-08-12
Attending: FAMILY MEDICINE
Payer: COMMERCIAL

## 2025-08-12 VITALS
RESPIRATION RATE: 16 BRPM | WEIGHT: 210.54 LBS | SYSTOLIC BLOOD PRESSURE: 136 MMHG | HEART RATE: 97 BPM | HEIGHT: 65 IN | BODY MASS INDEX: 35.08 KG/M2 | DIASTOLIC BLOOD PRESSURE: 67 MMHG | TEMPERATURE: 98.2 F | OXYGEN SATURATION: 93 %

## 2025-08-12 DIAGNOSIS — D64.9 ANEMIA, UNSPECIFIED TYPE: Primary | ICD-10-CM

## 2025-08-12 PROCEDURE — 250N000011 HC RX IP 250 OP 636: Mod: JZ | Performed by: FAMILY MEDICINE

## 2025-08-12 PROCEDURE — 258N000003 HC RX IP 258 OP 636: Performed by: FAMILY MEDICINE

## 2025-08-12 RX ORDER — HEPARIN SODIUM (PORCINE) LOCK FLUSH IV SOLN 100 UNIT/ML 100 UNIT/ML
5 SOLUTION INTRAVENOUS
OUTPATIENT
Start: 2025-08-12

## 2025-08-12 RX ORDER — MEPERIDINE HYDROCHLORIDE 25 MG/ML
25 INJECTION INTRAMUSCULAR; INTRAVENOUS; SUBCUTANEOUS
OUTPATIENT
Start: 2025-08-12

## 2025-08-12 RX ORDER — ALBUTEROL SULFATE 90 UG/1
1-2 INHALANT RESPIRATORY (INHALATION)
Start: 2025-08-12

## 2025-08-12 RX ORDER — EPINEPHRINE 1 MG/ML
0.3 INJECTION, SOLUTION, CONCENTRATE INTRAVENOUS EVERY 5 MIN PRN
OUTPATIENT
Start: 2025-08-12

## 2025-08-12 RX ORDER — METHYLPREDNISOLONE SODIUM SUCCINATE 125 MG/2ML
125 INJECTION INTRAMUSCULAR; INTRAVENOUS ONCE
Status: COMPLETED | OUTPATIENT
Start: 2025-08-12 | End: 2025-08-12

## 2025-08-12 RX ORDER — DIPHENHYDRAMINE HYDROCHLORIDE 50 MG/ML
25 INJECTION, SOLUTION INTRAMUSCULAR; INTRAVENOUS
Start: 2025-08-12

## 2025-08-12 RX ORDER — HEPARIN SODIUM,PORCINE 10 UNIT/ML
5-20 VIAL (ML) INTRAVENOUS DAILY PRN
OUTPATIENT
Start: 2025-08-12

## 2025-08-12 RX ORDER — ALBUTEROL SULFATE 0.83 MG/ML
2.5 SOLUTION RESPIRATORY (INHALATION)
OUTPATIENT
Start: 2025-08-12

## 2025-08-12 RX ORDER — DIPHENHYDRAMINE HYDROCHLORIDE 50 MG/ML
50 INJECTION, SOLUTION INTRAMUSCULAR; INTRAVENOUS
Start: 2025-08-12

## 2025-08-12 RX ORDER — METHYLPREDNISOLONE SODIUM SUCCINATE 40 MG/ML
40 INJECTION INTRAMUSCULAR; INTRAVENOUS
Start: 2025-08-12

## 2025-08-12 RX ADMIN — METHYLPREDNISOLONE SODIUM SUCCINATE 125 MG: 125 INJECTION, POWDER, FOR SOLUTION INTRAMUSCULAR; INTRAVENOUS at 09:18

## 2025-08-12 RX ADMIN — SODIUM CHLORIDE 975 MG: 9 INJECTION, SOLUTION INTRAVENOUS at 12:24

## 2025-08-12 RX ADMIN — Medication 250 ML: at 09:26

## 2025-08-12 RX ADMIN — IRON DEXTRAN 25 MG: 50 INJECTION INTRAMUSCULAR; INTRAVENOUS at 10:18

## 2025-08-12 ASSESSMENT — PAIN SCALES - GENERAL: PAINLEVEL_OUTOF10: MODERATE PAIN (6)

## 2025-08-27 ENCOUNTER — ONCOLOGY VISIT (OUTPATIENT)
Dept: ONCOLOGY | Facility: OTHER | Age: 65
End: 2025-08-27
Attending: INTERNAL MEDICINE
Payer: COMMERCIAL

## 2025-08-27 VITALS
HEIGHT: 65 IN | BODY MASS INDEX: 34.78 KG/M2 | HEART RATE: 109 BPM | DIASTOLIC BLOOD PRESSURE: 70 MMHG | OXYGEN SATURATION: 96 % | TEMPERATURE: 98.4 F | WEIGHT: 208.78 LBS | SYSTOLIC BLOOD PRESSURE: 128 MMHG

## 2025-08-27 DIAGNOSIS — D64.9 ANEMIA, UNSPECIFIED TYPE: ICD-10-CM

## 2025-08-27 LAB
BASOPHILS # BLD AUTO: 0.06 10E3/UL (ref 0–0.2)
BASOPHILS NFR BLD AUTO: 0.7 %
EOSINOPHIL # BLD AUTO: 0.12 10E3/UL (ref 0–0.7)
EOSINOPHIL NFR BLD AUTO: 1.3 %
ERYTHROCYTE [DISTWIDTH] IN BLOOD BY AUTOMATED COUNT: 31.8 % (ref 10–15)
FERRITIN SERPL-MCNC: 206 NG/ML (ref 11–328)
HCT VFR BLD AUTO: 41.6 % (ref 35–47)
HGB BLD-MCNC: 12.6 G/DL (ref 11.7–15.7)
IMM GRANULOCYTES # BLD: <0.03 10E3/UL
IMM GRANULOCYTES NFR BLD: 0.2 %
IRON BINDING CAPACITY (ROCHE): 320 UG/DL (ref 240–430)
IRON SATN MFR SERPL: 26 % (ref 15–46)
IRON SERPL-MCNC: 82 UG/DL (ref 37–145)
LYMPHOCYTES # BLD AUTO: 1.05 10E3/UL (ref 0.8–5.3)
LYMPHOCYTES NFR BLD AUTO: 11.8 %
MCH RBC QN AUTO: 23 PG (ref 26.5–33)
MCHC RBC AUTO-ENTMCNC: 30.3 G/DL (ref 31.5–36.5)
MCV RBC AUTO: 76.1 FL (ref 78–100)
MONOCYTES # BLD AUTO: 0.69 10E3/UL (ref 0–1.3)
MONOCYTES NFR BLD AUTO: 7.8 %
NEUTROPHILS # BLD AUTO: 6.96 10E3/UL (ref 1.6–8.3)
NEUTROPHILS NFR BLD AUTO: 78.2 %
NRBC # BLD AUTO: <0.03 10E3/UL
NRBC BLD AUTO-RTO: 0 /100
PLATELET # BLD AUTO: 264 10E3/UL (ref 150–450)
RBC # BLD AUTO: 5.47 10E6/UL (ref 3.8–5.2)
WBC # BLD AUTO: 8.9 10E3/UL (ref 4–11)

## 2025-08-27 PROCEDURE — 83921 ORGANIC ACID SINGLE QUANT: CPT | Mod: ZL | Performed by: INTERNAL MEDICINE

## 2025-08-27 PROCEDURE — 82746 ASSAY OF FOLIC ACID SERUM: CPT | Mod: ZL | Performed by: INTERNAL MEDICINE

## 2025-08-27 PROCEDURE — 82607 VITAMIN B-12: CPT | Mod: ZL | Performed by: INTERNAL MEDICINE

## 2025-08-27 PROCEDURE — 85004 AUTOMATED DIFF WBC COUNT: CPT | Mod: ZL | Performed by: INTERNAL MEDICINE

## 2025-08-27 PROCEDURE — 36415 COLL VENOUS BLD VENIPUNCTURE: CPT | Mod: ZL | Performed by: INTERNAL MEDICINE

## 2025-08-27 PROCEDURE — G0463 HOSPITAL OUTPT CLINIC VISIT: HCPCS

## 2025-08-27 PROCEDURE — 86340 INTRINSIC FACTOR ANTIBODY: CPT | Mod: ZL | Performed by: INTERNAL MEDICINE

## 2025-08-27 PROCEDURE — 82728 ASSAY OF FERRITIN: CPT | Mod: ZL | Performed by: INTERNAL MEDICINE

## 2025-08-27 PROCEDURE — 83550 IRON BINDING TEST: CPT | Mod: ZL | Performed by: INTERNAL MEDICINE

## 2025-08-27 RX ORDER — FERROUS SULFATE 325(65) MG
325 TABLET ORAL
Qty: 90 TABLET | Refills: 0 | Status: SHIPPED | OUTPATIENT
Start: 2025-08-27

## 2025-08-27 RX ORDER — MULTIVIT WITH MINERALS/LUTEIN
250 TABLET ORAL DAILY
Qty: 90 TABLET | Refills: 1 | Status: SHIPPED | OUTPATIENT
Start: 2025-08-27

## 2025-08-27 ASSESSMENT — PAIN SCALES - GENERAL: PAINLEVEL_OUTOF10: NO PAIN (0)

## 2025-08-27 ASSESSMENT — PATIENT HEALTH QUESTIONNAIRE - PHQ9: SUM OF ALL RESPONSES TO PHQ QUESTIONS 1-9: 6

## 2025-08-28 ENCOUNTER — TELEPHONE (OUTPATIENT)
Dept: ONCOLOGY | Facility: OTHER | Age: 65
End: 2025-08-28

## 2025-08-28 LAB
FOLATE SERPL-MCNC: 14 NG/ML (ref 4.6–34.8)
VIT B12 SERPL-MCNC: 490 PG/ML (ref 232–1245)

## 2025-08-30 LAB — IF BLOCK AB SER QL RIA: NEGATIVE

## 2025-09-02 LAB — METHYLMALONATE SERPL-SCNC: 0.35 UMOL/L (ref 0–0.4)

## (undated) DEVICE — BLADE SAGITTAL 18MM X 90MM X 1.27MM BR4118-127-090

## (undated) DEVICE — STRATAFIX 1 SYMMETRIC PDS 24" DYED W/40MM 1/2 CIRC SXPP1A444

## (undated) DEVICE — DRAPE IOBAN ISOLATION VERTICAL 320X21CM 6617

## (undated) DEVICE — SYR 10ML LL W/O NDL 302995

## (undated) DEVICE — SUCTION MANIFOLD NEPTUNE 2 SYS 4 PORT 0702-020-000

## (undated) DEVICE — DRSG NON ADHERING 3 X 8 TELFA 1238

## (undated) DEVICE — SOL NACL 0.9% IRRIG 3000ML BAG 2B7477

## (undated) DEVICE — SPONGE RAY-TEC 4X4" 7317

## (undated) DEVICE — ENDO TROCAR OPTICAL ACCESS KII Z-THRD 08X100MM C0Q19

## (undated) DEVICE — PROTECTER ALEXIS TISSUE SMALL MEDIUM 2.5-8CM INCS HR004

## (undated) DEVICE — CATH SELF CATH MALE 14FR 414

## (undated) DEVICE — ESU GROUND PAD ADULT W/CORD E7507

## (undated) DEVICE — BIN-UROLOGY / CYSTO BN47

## (undated) DEVICE — PACK DIRECT ANTERIOR HIP SOPCNAHMB4

## (undated) DEVICE — HOOD STRYKER FOR ORTHO 0408-800-400

## (undated) DEVICE — PAD PERI INDIV WRAP 11" 2022A

## (undated) DEVICE — CONNECTOR ERBEFLO 2 PORT 20325-215

## (undated) DEVICE — PREP CHLORAPREP 26ML TINTED HI-LITE ORANGE 930815

## (undated) DEVICE — SU VICRYL 1 CT-1 27" UND J261H

## (undated) DEVICE — PRESSURE INFUSOR DISP. 3000CC 233000

## (undated) DEVICE — SLEEVE SCD EXPRESS KNEE LENGTH MED 9529

## (undated) DEVICE — SOL NACL 0.9% IRRIG 1000ML BOTTLE 2F7124

## (undated) DEVICE — PACK LAP LAVH CUSTOM SMA32LPMBG

## (undated) DEVICE — SU MONOCRYL 3-0 PS-1 27" Y936H

## (undated) DEVICE — SOL WATER IRRIG 1000ML BOTTLE 2F7114

## (undated) DEVICE — LABEL STERILE PREPRINTED FOR OR FRRH01-2M

## (undated) DEVICE — NDL-INSUFFLATION 120MM

## (undated) DEVICE — TUBING SUCTION 20FT N620A

## (undated) DEVICE — ENDO TROCAR SHIELDED BLADED KII ADV FIX 05X100MM CFB03

## (undated) DEVICE — SPONGE LAP 18X18" X8435

## (undated) DEVICE — TRAY PREP DRY SKIN SCRUB 067

## (undated) DEVICE — CANISTER SUCTION MEDI-VAC GUARDIAN 2000ML 90D 65651-220

## (undated) DEVICE — BLADE 10 RB BK SS STRL LF DISP 371210

## (undated) DEVICE — PUNCTURE CLOSURE DEVICE PMITCSG

## (undated) DEVICE — DRAPE IOBAN INCISE 13X13" 6640EZ

## (undated) DEVICE — ENDO SEAL SCOPE SELF SEAL 1.2MM 26153EAU

## (undated) DEVICE — KIT PATIENT POSITIONING PIGAZZI LATEX FREE 40580

## (undated) DEVICE — ENDO TROCAR SHIELDED BLADED KII ADV FIX 11X100MM CFB33

## (undated) DEVICE — GLOVE PROTEXIS POWDER FREE 7.0 ORTHOPEDIC 2D73ET70

## (undated) DEVICE — PACK BASIN SET UP SUTCNBSBBA

## (undated) DEVICE — COVER LT HANDLE 2/PK 5160-2FG

## (undated) DEVICE — TUBING INFLOW HYSTEROPUMP 4170.223

## (undated) DEVICE — DRAPE STERI TOWEL LG 1010

## (undated) DEVICE — TAPE DURAPORE 2"X1.5YD SILK 1538S-2

## (undated) DEVICE — CLEARIFY VISUALIZATION SYSTEM

## (undated) DEVICE — UTERINE MANIPULATOR HUMI KRONER 6003

## (undated) DEVICE — ENDO TROCAR SLEEVE KII ADV FIXATION 05X100MM CFS02

## (undated) DEVICE — SUCTION STRYKERFLOW II 250-070-500

## (undated) DEVICE — CLEANSER JET LAVAGE IRRISEPT 0.05% CHG IRRISEPT45USA

## (undated) DEVICE — WAND ESURG WEREWOLF FASTSEAL 6 HMSTS STRL LF DISP 72290042

## (undated) DEVICE — LIGASURE-5MM BLUNT TIP LAPAROSCOPIC

## (undated) DEVICE — BONE WAX 2.5GM W31G

## (undated) DEVICE — SU VICRYL 1 CP-1 27" J468H

## (undated) DEVICE — BLADE CLIPPER SGL USE 9680

## (undated) DEVICE — TUBING INSUFFLATION INSUFFLATOR FILTER HIGH FLOW 031322-01

## (undated) DEVICE — EVAC SYSTEM CLEAR FLOW SC082500

## (undated) DEVICE — TAPE MICROFOAM 4" 1528-4

## (undated) DEVICE — SUCTION MANIFOLD NEPTUNE 2 SYS 1 PORT 702-025-000

## (undated) DEVICE — FORCEPS BIOPSY RADIAL JAW 4 LARGE W/NEEDLE 240CM M00513332

## (undated) DEVICE — KIT PATIENT CARE HANA TABLE PROFX SUPINE 6855

## (undated) DEVICE — GLV 8.5 BIOGEL LATEX 30485-01

## (undated) DEVICE — DRSG AQUACEL AG HYDROFIBER  3.5X10" 422605

## (undated) DEVICE — DRAPE POUCH INSTRUMENT 1018

## (undated) DEVICE — SUTURE VICRYL+ 2-0 27IN CT-1 UND VCP259H

## (undated) DEVICE — ENDO POUCH UNIV RETRIEVAL SYSTEM INZII 10MM CD001

## (undated) DEVICE — DRILL BIT FLEXIBLE REFLECTION 25MM  71362925

## (undated) DEVICE — SET TUBING OUTFLOW FLUID MANAGER STRL DISP 4170.901

## (undated) DEVICE — DRAPE SHEET REV FOLD 3/4 9349

## (undated) DEVICE — Device

## (undated) RX ORDER — DEXAMETHASONE SODIUM PHOSPHATE 10 MG/ML
INJECTION, SOLUTION INTRAMUSCULAR; INTRAVENOUS
Status: DISPENSED
Start: 2023-12-13

## (undated) RX ORDER — PROPOFOL 10 MG/ML
INJECTION, EMULSION INTRAVENOUS
Status: DISPENSED
Start: 2024-04-29

## (undated) RX ORDER — ONDANSETRON 2 MG/ML
INJECTION INTRAMUSCULAR; INTRAVENOUS
Status: DISPENSED
Start: 2023-12-13

## (undated) RX ORDER — HYDROMORPHONE HYDROCHLORIDE 1 MG/ML
INJECTION, SOLUTION INTRAMUSCULAR; INTRAVENOUS; SUBCUTANEOUS
Status: DISPENSED
Start: 2024-04-29

## (undated) RX ORDER — KETOROLAC TROMETHAMINE 30 MG/ML
INJECTION, SOLUTION INTRAMUSCULAR; INTRAVENOUS
Status: DISPENSED
Start: 2023-12-13

## (undated) RX ORDER — ONDANSETRON 2 MG/ML
INJECTION INTRAMUSCULAR; INTRAVENOUS
Status: DISPENSED
Start: 2024-04-29

## (undated) RX ORDER — DEXAMETHASONE SODIUM PHOSPHATE 10 MG/ML
INJECTION, SOLUTION INTRAMUSCULAR; INTRAVENOUS
Status: DISPENSED
Start: 2024-04-29

## (undated) RX ORDER — PROPOFOL 10 MG/ML
INJECTION, EMULSION INTRAVENOUS
Status: DISPENSED
Start: 2023-12-13